# Patient Record
Sex: MALE | Race: WHITE | NOT HISPANIC OR LATINO | Employment: FULL TIME | URBAN - METROPOLITAN AREA
[De-identification: names, ages, dates, MRNs, and addresses within clinical notes are randomized per-mention and may not be internally consistent; named-entity substitution may affect disease eponyms.]

---

## 2020-06-08 ENCOUNTER — HOSPITAL ENCOUNTER (OUTPATIENT)
Dept: LAB | Facility: HOSPITAL | Age: 60
Discharge: HOME | End: 2020-06-08
Attending: INTERNAL MEDICINE

## 2020-06-08 LAB
ALBUMIN SERPL-MCNC: 4.4 G/DL (ref 3.5–5)
ALBUMIN/GLOB SERPL: 1.3 {RATIO} (ref 1.4–2.6)
ALP SERPL-CCNC: 86 U/L (ref 56–119)
ALT SERPL-CCNC: 30 U/L (ref 10–40)
ANION GAP SERPL CALC-SCNC: 16 MMOL/L (ref 8–19)
APPEARANCE UR: CLEAR
AST SERPL-CCNC: 20 U/L (ref 15–50)
BASOPHILS # BLD AUTO: 0.12 10*3/UL (ref 0–0.2)
BASOPHILS NFR BLD AUTO: 1.1 % (ref 0–3)
BILIRUB SERPL-MCNC: 0.26 MG/DL (ref 0.2–1.3)
BILIRUB UR QL: NEGATIVE
BUN SERPL-MCNC: 15 MG/DL (ref 5–25)
BUN/CREAT SERPL: 14 {RATIO} (ref 6–20)
CALCIUM SERPL-MCNC: 9.8 MG/DL (ref 8.7–10.4)
CHLORIDE SERPL-SCNC: 103 MMOL/L (ref 99–111)
CHOLEST SERPL-MCNC: 240 MG/DL (ref 107–200)
CHOLEST/HDLC SERPL: 7.7 {RATIO} (ref 3–6)
COLOR UR: YELLOW
CONV ABS IMM GRAN: 0.02 10*3/UL (ref 0–0.2)
CONV BACTERIA: NEGATIVE
CONV CO2: 23 MMOL/L (ref 22–32)
CONV COLLECTION SOURCE (UA): ABNORMAL
CONV IMMATURE GRAN: 0.2 % (ref 0–1.8)
CONV TOTAL PROTEIN: 7.7 G/DL (ref 6.3–8.2)
CONV UROBILINOGEN IN URINE BY AUTOMATED TEST STRIP: 1 {EHRLICHU}/DL (ref 0.1–1)
CREAT UR-MCNC: 1.11 MG/DL (ref 0.7–1.2)
DEPRECATED RDW RBC AUTO: 46 FL (ref 35.1–43.9)
EOSINOPHIL # BLD AUTO: 0.45 10*3/UL (ref 0–0.7)
EOSINOPHIL # BLD AUTO: 4 % (ref 0–7)
ERYTHROCYTE [DISTWIDTH] IN BLOOD BY AUTOMATED COUNT: 13.5 % (ref 11.6–14.4)
EST. AVERAGE GLUCOSE BLD GHB EST-MCNC: 123 MG/DL
GFR SERPLBLD BASED ON 1.73 SQ M-ARVRAT: >60 ML/MIN/{1.73_M2}
GLOBULIN UR ELPH-MCNC: 3.3 G/DL (ref 2–3.5)
GLUCOSE SERPL-MCNC: 81 MG/DL (ref 70–99)
GLUCOSE UR QL: NEGATIVE MG/DL
HBA1C MFR BLD: 5.9 % (ref 3.5–5.7)
HCT VFR BLD AUTO: 51.9 % (ref 42–52)
HDLC SERPL-MCNC: 31 MG/DL (ref 40–60)
HGB BLD-MCNC: 17.5 G/DL (ref 14–18)
HGB UR QL STRIP: NEGATIVE
KETONES UR QL STRIP: NEGATIVE MG/DL
LDLC SERPL CALC-MCNC: 153 MG/DL (ref 70–100)
LEUKOCYTE ESTERASE UR QL STRIP: ABNORMAL
LYMPHOCYTES # BLD AUTO: 3.79 10*3/UL (ref 1–5)
LYMPHOCYTES NFR BLD AUTO: 33.6 % (ref 20–45)
MAGNESIUM SERPL-MCNC: 2.17 MG/DL (ref 1.6–2.3)
MCH RBC QN AUTO: 30.8 PG (ref 27–31)
MCHC RBC AUTO-ENTMCNC: 33.7 G/DL (ref 33–37)
MCV RBC AUTO: 91.4 FL (ref 80–96)
MONOCYTES # BLD AUTO: 0.85 10*3/UL (ref 0.2–1.2)
MONOCYTES NFR BLD AUTO: 7.5 % (ref 3–10)
NEUTROPHILS # BLD AUTO: 6.06 10*3/UL (ref 2–8)
NEUTROPHILS NFR BLD AUTO: 53.6 % (ref 30–85)
NITRITE UR QL STRIP: NEGATIVE
NRBC CBCN: 0 % (ref 0–0.7)
OSMOLALITY SERPL CALC.SUM OF ELEC: 286 MOSM/KG (ref 273–304)
PH UR STRIP.AUTO: 5.5 [PH] (ref 5–8)
PLATELET # BLD AUTO: 331 10*3/UL (ref 130–400)
PMV BLD AUTO: 10.6 FL (ref 9.4–12.4)
POTASSIUM SERPL-SCNC: 4 MMOL/L (ref 3.5–5.3)
PROT UR QL: NEGATIVE MG/DL
RBC # BLD AUTO: 5.68 10*6/UL (ref 4.7–6.1)
RBC #/AREA URNS HPF: ABNORMAL /[HPF]
SODIUM SERPL-SCNC: 138 MMOL/L (ref 135–147)
SP GR UR: 1.02 (ref 1–1.03)
T4 FREE SERPL-MCNC: 1.2 NG/DL (ref 0.9–1.8)
TRIGL SERPL-MCNC: 423 MG/DL (ref 40–150)
TSH SERPL-ACNC: 1.03 M[IU]/L (ref 0.27–4.2)
WBC # BLD AUTO: 11.29 10*3/UL (ref 4.8–10.8)
WBC #/AREA URNS HPF: ABNORMAL /[HPF]

## 2020-08-10 ENCOUNTER — HOSPITAL ENCOUNTER (OUTPATIENT)
Dept: LAB | Facility: HOSPITAL | Age: 60
Discharge: HOME | End: 2020-08-10
Attending: INTERNAL MEDICINE

## 2020-08-10 ENCOUNTER — HOSPITAL ENCOUNTER (OUTPATIENT)
Dept: CT IMAGING | Facility: HOSPITAL | Age: 60
Discharge: HOME | End: 2020-08-10
Attending: INTERNAL MEDICINE

## 2020-08-10 LAB
ANION GAP SERPL CALC-SCNC: 22 MMOL/L (ref 8–19)
BUN SERPL-MCNC: 15 MG/DL (ref 5–25)
BUN/CREAT SERPL: 13 {RATIO} (ref 6–20)
CALCIUM SERPL-MCNC: 10 MG/DL (ref 8.7–10.4)
CHLORIDE SERPL-SCNC: 103 MMOL/L (ref 99–111)
CONV CO2: 20 MMOL/L (ref 22–32)
CREAT UR-MCNC: 1.19 MG/DL (ref 0.7–1.2)
GFR SERPLBLD BASED ON 1.73 SQ M-ARVRAT: >60 ML/MIN/{1.73_M2}
GLUCOSE SERPL-MCNC: 125 MG/DL (ref 70–99)
OSMOLALITY SERPL CALC.SUM OF ELEC: 294 MOSM/KG (ref 273–304)
POTASSIUM SERPL-SCNC: 4.4 MMOL/L (ref 3.5–5.3)
SODIUM SERPL-SCNC: 141 MMOL/L (ref 135–147)

## 2020-08-25 ENCOUNTER — OFFICE VISIT CONVERTED (OUTPATIENT)
Dept: OTHER | Facility: HOSPITAL | Age: 60
End: 2020-08-25
Attending: INTERNAL MEDICINE

## 2020-08-31 ENCOUNTER — HOSPITAL ENCOUNTER (OUTPATIENT)
Dept: PET IMAGING | Facility: HOSPITAL | Age: 60
Discharge: HOME | End: 2020-08-31
Attending: INTERNAL MEDICINE

## 2020-09-01 ENCOUNTER — OFFICE VISIT CONVERTED (OUTPATIENT)
Dept: PULMONOLOGY | Facility: CLINIC | Age: 60
End: 2020-09-01
Attending: INTERNAL MEDICINE

## 2021-03-03 ENCOUNTER — HOSPITAL ENCOUNTER (OUTPATIENT)
Dept: URGENT CARE | Facility: CLINIC | Age: 61
Discharge: HOME | End: 2021-03-03
Attending: EMERGENCY MEDICINE

## 2021-03-04 LAB — SARS-COV-2 RNA SPEC QL NAA+PROBE: NOT DETECTED

## 2021-03-05 LAB — BACTERIA SPEC AEROBE CULT: NORMAL

## 2021-03-29 ENCOUNTER — HOSPITAL ENCOUNTER (OUTPATIENT)
Dept: LAB | Facility: HOSPITAL | Age: 61
Discharge: HOME | End: 2021-03-29
Attending: INTERNAL MEDICINE

## 2021-03-29 LAB
ALBUMIN SERPL-MCNC: 3.9 G/DL (ref 3.5–5)
ALBUMIN/GLOB SERPL: 1.4 {RATIO} (ref 1.4–2.6)
ALP SERPL-CCNC: 74 U/L (ref 56–119)
ALT SERPL-CCNC: 20 U/L (ref 10–40)
ANION GAP SERPL CALC-SCNC: 20 MMOL/L (ref 8–19)
AST SERPL-CCNC: 20 U/L (ref 15–50)
BASOPHILS # BLD AUTO: 0.11 10*3/UL (ref 0–0.2)
BASOPHILS NFR BLD AUTO: 1.1 % (ref 0–3)
BILIRUB SERPL-MCNC: 0.25 MG/DL (ref 0.2–1.3)
BUN SERPL-MCNC: 13 MG/DL (ref 5–25)
BUN/CREAT SERPL: 12 {RATIO} (ref 6–20)
CALCIUM SERPL-MCNC: 9.4 MG/DL (ref 8.7–10.4)
CHLORIDE SERPL-SCNC: 103 MMOL/L (ref 99–111)
CHOLEST SERPL-MCNC: 233 MG/DL (ref 107–200)
CHOLEST/HDLC SERPL: 6 {RATIO} (ref 3–6)
CONV ABS IMM GRAN: 0.02 10*3/UL (ref 0–0.2)
CONV CO2: 22 MMOL/L (ref 22–32)
CONV IMMATURE GRAN: 0.2 % (ref 0–1.8)
CONV TOTAL PROTEIN: 6.7 G/DL (ref 6.3–8.2)
CREAT UR-MCNC: 1.12 MG/DL (ref 0.7–1.2)
DEPRECATED RDW RBC AUTO: 46.3 FL (ref 35.1–43.9)
EOSINOPHIL # BLD AUTO: 0.36 10*3/UL (ref 0–0.7)
EOSINOPHIL # BLD AUTO: 3.5 % (ref 0–7)
ERYTHROCYTE [DISTWIDTH] IN BLOOD BY AUTOMATED COUNT: 13.7 % (ref 11.6–14.4)
EST. AVERAGE GLUCOSE BLD GHB EST-MCNC: 111 MG/DL
GFR SERPLBLD BASED ON 1.73 SQ M-ARVRAT: >60 ML/MIN/{1.73_M2}
GLOBULIN UR ELPH-MCNC: 2.8 G/DL (ref 2–3.5)
GLUCOSE SERPL-MCNC: 126 MG/DL (ref 70–99)
HBA1C MFR BLD: 5.5 % (ref 3.5–5.7)
HCT VFR BLD AUTO: 46.4 % (ref 42–52)
HDLC SERPL-MCNC: 39 MG/DL (ref 40–60)
HGB BLD-MCNC: 15.4 G/DL (ref 14–18)
LDLC SERPL CALC-MCNC: 141 MG/DL (ref 70–100)
LYMPHOCYTES # BLD AUTO: 3.08 10*3/UL (ref 1–5)
LYMPHOCYTES NFR BLD AUTO: 30.3 % (ref 20–45)
MCH RBC QN AUTO: 30.4 PG (ref 27–31)
MCHC RBC AUTO-ENTMCNC: 33.2 G/DL (ref 33–37)
MCV RBC AUTO: 91.5 FL (ref 80–96)
MONOCYTES # BLD AUTO: 0.49 10*3/UL (ref 0.2–1.2)
MONOCYTES NFR BLD AUTO: 4.8 % (ref 3–10)
NEUTROPHILS # BLD AUTO: 6.09 10*3/UL (ref 2–8)
NEUTROPHILS NFR BLD AUTO: 60.1 % (ref 30–85)
NRBC CBCN: 0 % (ref 0–0.7)
OSMOLALITY SERPL CALC.SUM OF ELEC: 294 MOSM/KG (ref 273–304)
PLATELET # BLD AUTO: 291 10*3/UL (ref 130–400)
PMV BLD AUTO: 10 FL (ref 9.4–12.4)
POTASSIUM SERPL-SCNC: 4.2 MMOL/L (ref 3.5–5.3)
PSA SERPL-MCNC: 3.05 NG/ML (ref 0–4)
RBC # BLD AUTO: 5.07 10*6/UL (ref 4.7–6.1)
SODIUM SERPL-SCNC: 141 MMOL/L (ref 135–147)
TRIGL SERPL-MCNC: 263 MG/DL (ref 40–150)
VLDLC SERPL-MCNC: 53 MG/DL (ref 5–37)
WBC # BLD AUTO: 10.15 10*3/UL (ref 4.8–10.8)

## 2021-04-05 ENCOUNTER — HOSPITAL ENCOUNTER (OUTPATIENT)
Dept: CT IMAGING | Facility: HOSPITAL | Age: 61
Discharge: HOME | End: 2021-04-05
Attending: INTERNAL MEDICINE

## 2021-05-03 ENCOUNTER — OFFICE VISIT CONVERTED (OUTPATIENT)
Dept: PULMONOLOGY | Facility: CLINIC | Age: 61
End: 2021-05-03
Attending: INTERNAL MEDICINE

## 2021-05-22 ENCOUNTER — TRANSCRIBE ORDERS (OUTPATIENT)
Dept: PULMONOLOGY | Facility: CLINIC | Age: 61
End: 2021-05-22

## 2021-05-22 DIAGNOSIS — R91.1 PULMONARY NODULE: Primary | ICD-10-CM

## 2021-05-28 VITALS
SYSTOLIC BLOOD PRESSURE: 139 MMHG | HEIGHT: 72 IN | OXYGEN SATURATION: 96 % | HEART RATE: 112 BPM | WEIGHT: 305 LBS | BODY MASS INDEX: 41.31 KG/M2 | HEART RATE: 96 BPM | TEMPERATURE: 98.2 F | BODY MASS INDEX: 39.28 KG/M2 | TEMPERATURE: 96.9 F | HEIGHT: 72 IN | SYSTOLIC BLOOD PRESSURE: 95 MMHG | OXYGEN SATURATION: 98 % | RESPIRATION RATE: 20 BRPM | DIASTOLIC BLOOD PRESSURE: 63 MMHG | WEIGHT: 290 LBS | DIASTOLIC BLOOD PRESSURE: 90 MMHG | RESPIRATION RATE: 16 BRPM

## 2021-05-28 VITALS
HEART RATE: 103 BPM | SYSTOLIC BLOOD PRESSURE: 136 MMHG | RESPIRATION RATE: 18 BRPM | HEIGHT: 72 IN | BODY MASS INDEX: 40.14 KG/M2 | WEIGHT: 296.37 LBS | DIASTOLIC BLOOD PRESSURE: 79 MMHG | OXYGEN SATURATION: 96 % | TEMPERATURE: 97.4 F

## 2021-05-28 NOTE — PROGRESS NOTES
Patient: CAITLIN MCKENNA JR     Acct: [id number]     Report: #[id number]  UNIT #: [id number]     : 1960    Encounter Date:2020  PRIMARY CARE: CIERRA ZHOU  ***Signed***  --------------------------------------------------------------------------------------------------------------------  Chief Complaint      Encounter Date      Sep 1, 2020            Primary Care Provider      CIERRA ZHOU            Referring Provider      CIERRA ZHOU            Patient Complaint      Patient is complaining of      Pet results            VITALS      Height 6 ft  / 182.88 cm      Weight 290 lbs  / 131.819432 kg      BSA 2.49 m2      BMI 39.3 kg/m2      Temperature 98.2 F / 36.78 C - Tympanic      Pulse 96      Respirations 16      Blood Pressure 139/90 Sitting, Right Arm      Pulse Oximetry 96%, room air            HPI      The patient is a very pleasant 60 year old morbidly obese  male     cigarettes smoker with solitary lung nodules here for follow up today.             Since his last office visit PET CT scan was done showing no uptake in 9 mm     pulmonary nodule. He is still smoking about 4-5 cigarettes a day. We will do     serial follow up for this patient. He denies any dyspnea, cough, wheezing,     headaches and hemoptysis or chest pain. He feels much better since starting     trelegy ellipta inhaler last week. He denies any wheezing, weight loss, nausea     or vomiting, fever or chills. He is able to perform his ADL's without difficulty    and denies any swollen glands in his lymph nodes, head or neck.            I personally reviewed Review of Systems, family, social, surgical and medical     history and agree with their findings.            ROS      Constitutional:  Denies: Fatigue, Fever, Weight gain, Weight loss, Chills,     Insomnia, Other      Respiratory/Breathing:  Denies: Shortness of air, Wheezing, Cough, Hemoptysis,     Pleuritic pain, Other      Endocrine:  Denies: Polydipsia,  Polyuria, Heat/cold intolerance, Diabetes, Other      Eyes:  Denies: Blurred vision, Vision Changes, Other      Ears, nose, mouth, throat:  Denies: Mouth lesions, Thrush, Throat pain, Hoarse    ness, Allergies/Hay Fever, Post Nasal Drip, Headaches, Recent Head Injury, Nose     Bleeding, Neck Stiffness, Thyroid Mass, Hearing Loss, Ear Fullness, Dry Mouth,     Nasal or Sinus Pain, Dry Lips, Nasal discharge, Nasal congestion, Other      Cardiovascular:  Denies: Palpitations, Syncope, Claudication, Chest Pain, Wake     up Gasping for air, Leg Swelling, Irregular Heart Rate, Cyanosis, Dyspnea on     Exertion, Other      Gastrointestinal:  Denies: Nausea, Constipation, Diarrhea, Abdominal pain,     Vomiting, Difficulty Swallowing, Reflux/Heartburn, Dysphagia, Jaundice,     Bloating, Melena, Bloody stools, Other      Genitourinary:  Denies: Urinary frequency, Incontinence, Hematuria, Urgency,     Nocturia, Dysuria, Testicular problems, Other      Musculoskeletal:  Denies: Joint Pain, Joint Stiffness, Joint Swelling, Myalgias,    Other      Hematologic/lymphatic:  DENIES: Lymphadenopathy, Bruising, Bleeding tendencies,     Other      Neurological:  Denies: Headache, Numbness, Weakness, Seizures, Other      Psychiatric:  Denies: Anxiety, Appropriate Effect, Depression, Other      Sleep:  No: Excessive daytime sleep, Morning Headache?, Snoring, Insomnia?, Stop    breathing at sleep?, Other      Integumentary:  Denies: Rash, Dry skin, Skin Warm to Touch, Other      Immunologic/Allergic:  Denies: Latex allergy, Seasonal allergies, Asthma,     Urticaria, Eczema, Other      Immunization status:  No: Up to date            FAMILY/SOCIAL/MEDICAL HX      Surgical History:  Yes: Nose Surgery      Cancer/Type - Family Hx:  Mother (breast cancer)      Other Family Medical History:  Father      Is Father Still Living?:  Yes      Is Mother Still Living?:  Yes       Family History:  Yes      Social History:  Tobacco Use; No Alcohol Use, No  Recreational Drug use      Smoking status:  Current every day smoker (40 years x 2 ppd, currently smoking 1    ppd)      Anticoagulation Therapy:  No      Antibiotic Prophylaxis:  No      Medical History:  Yes: High Blood Pressure; No: Sinus Trouble      Psychiatric History      none            PREVENTION      Hx Influenza Vaccination:  No      Influenza Vaccine Declined:  Yes      2 or More Falls in Past Year?:  No      Fall Past Year with Injury?:  No      Hx Pneumococcal Vaccination:  No      Encouraged to follow-up with:  PCP regarding preventative exams.      Chart initiated by      Candi Olea CMA            ALLERGIES/MEDICATIONS      Allergies:        Coded Allergies:             NO KNOWN DRUG ALLERGIES (Verified  Allergy, Unknown, 9/1/20)      Medications    Last Reconciled on 9/1/20 15:35 by CLEMENCIA HENNING MD      Fluticasone/Umeclidin/Vilanter (Trelegy Ellipta 100-62.5-25) 1 Each Blst.w.dev      1 PUFF INH RTQDAY for 30 Days, #1 INH 4 Refills         Prov: Guanakito Leonardo         8/25/20       MDI-Albuterol (Proair HFA) 8.5 Gm Hfa.aer.ad      2 PUFFS INH Q6H PRN for SHORTNESS OF BREATH, #1 MDI 0 Refills         Reported         8/24/20       Irbesartan (Irbesartan) 75 Mg Tablet      75 MG PO QDAY for 30 Days, #30 TAB         Reported         8/24/20       amLODIPine (amLODIPine) 5 Mg Tablet      5 MG PO HS, #30 TAB         Reported         8/24/20      Current Medications      Current Medications Reviewed 9/1/20            EXAM      Vital Signs Reviewed      Gen: WDWN, Alert, NAD.        HEENT:  PERRL, EOMI.  OP, nares clear, no sinus tenderness.      Neck:  Supple, no JVD, no thyromegaly.      Lymph: No axillary, cervical, supraclavicular lymphadenopathy noted bilaterally.      Chest:  Good aeration, clear to auscultation bilaterally, tympanic to percussion    bilaterally, no work of breathing noted.      CV:  RRR, no MGR, pulses 2+, equal.      Abd:  Soft, NT, ND, + BS, no HSM. Obese.        EXT:  No  clubbing, no cyanosis, no edema, no joint tenderness.       Neuro:  A  Skin: No rashes or lesions.      Vtials      Vitals:             Height 6 ft  / 182.88 cm           Weight 290 lbs  / 131.575452 kg           BSA 2.49 m2           BMI 39.3 kg/m2           Temperature 98.2 F / 36.78 C - Tympanic           Pulse 96           Respirations 16           Blood Pressure 139/90 Sitting, Right Arm           Pulse Oximetry 96%, room air            REVIEW      Results Reviewed      PCCS Results Reviewed?:  Yes Prev Radiology Results, Yes Previous Mecial Records      Lab Results      I personally reviewed Dr. Leonardo's last office note.      Radiographic Results               [facility]                PACS RADIOLOGY REPORT            Patient: CAITLIN MCKENNA JR   Acct: #[id number]   Report: #[id number]            UNIT #: [id number]    DOS: 20 1309      INSURANCE:[employer] PLANS   ORDER #:[id number]      LOCATION:PET     : 1960            PROVIDERS      ADMITTING:     ATTENDING: Guanakito Leonardo      FAMILY:  CIERRA ZHOU   ORDERING:  Guanakito Leonardo         OTHER:    DICTATING:  Tomás Chapman MD            REQ #:[id number]   EXAM:[id number] - SKULL BASE-MIDTHIGH INIT fdg      REASON FOR EXAM:  R91.1      REASON FOR VISIT:  R91.1            *******Signed******         PROCEDURE:   PET CT SKULL BASE TO MID THIGH INITIAL             COMPARISON:   [facility], CT, CT LOW DOSE CHEST SCREENING,     8/10/2020, 8:32.             INDICATIONS:   SPN             TECHNIQUE:   After obtaining the patient's consent, F-18 FDG was administered     intravenously.  A PET       scan with concurrent CT imaging was performed from the skull to the thigh with     multiplanar imaging.             RADIONUCLIDE:     12.1 MCI   F18 FDG- I.V.      LABS:                          Blood Glucose 96 mg/dl      UPTAKE TIME:        60 mins      BACKGROUND UPTAKE:   Mediastinal background 2.80 SUV.  Liver background 4.1 SUV.             FINDINGS:         Physiologic uptake is seen in the head and neck.             No abnormal thoracic uptake is evident.  Specifically, no abnormal uptake is     seen in the 9 mm       nodule seen in the right upper lobe.  Follow-up CT scan of the chest is     recommended in 6 months.             No abnormal uptake is seen in the abdomen and pelvis.             2.5 cm umbilical hernia defect is evident.  Fat within the hernia measures 4 cm.                 No abnormal skeletal uptake is evident.             CONCLUSION:         PET-CT demonstrating no abnormal uptake in the 9 mm right upper lobe nodule.             Follow-up CT scan of the chest is recommended in 6 months.                HUGH HERNANDEZ MD             Electronically Signed and Approved By: HUGH HERNANDEZ MD on 2020 at 15:56                                  Until signed, this is an unconfirmed preliminary report that may contain      errors and is subject to change.                                              COUST:      D:20 1556                     [facility]                PACS RADIOLOGY REPORT            Patient: CAITLIN MCKENNA JR   Acct: #[id number]   Report: #[id number]            UNIT #: [id number]    DOS: 08/10/20 0823      INSURANCE:[employer] PL[employer]   ORDER #:[id number]      LOCATION:CT     : 1960            PROVIDERS      ADMITTING:     ATTENDING: CIERRA ZHOU      FAMILY:  CIERRA ZHOU   ORDERING:  CIERRA ZHOU         OTHER:    DICTATING:  SARAH CHURCH MD            REQ #:[id number]   EXAM:[facility] - LOW DOSE CHEST CT SCREENING      REASON FOR EXAM:  CURRENT SMOKER      REASON FOR VISIT:  CURRENT SMOKER            *******Signed******         PROCEDURE:   CT LOW DOSE CHEST SCREENING             COMPARISON:   None.             REASON FOR SCREENING:   Patient is 59 years of age,  asymptomatic, and has a     smoking history of more       than 30 pack years.      SMOKING STATUS:   Current smoker.                  SCREENING VISIT:   Baseline.                   TECHNIQUE:   Axial unenhanced LDCT images from the apices through mid-kidney     were obtained.       Evaluation of solid organs and vascular structures is suboptimal due to the lack    of IV contrast.       Imaging was performed on a Siemens Sensation 64 CT scanner.             RADIATION:   CT Dose Index Vol (CTDIvol):    4.55  mGy         Dose Length Product (DLP):    194  mGy-cm             DIAGNOSTIC QUALITY:   Satisfactory             FINDINGS:         Lobulated 9 mm nodule right upper lobe (image 59).  Mild paraseptal emphysema.      No adenopathy in       the chest.  Coronary artery calcification.  No acute findings in the included     upper abdomen.  No       aggressive appearing bone change.             CONCLUSION:   9 mm nodule right upper lobe.             Lung rads category 4A suspicious.  Per the ACR lung rads recommendations,     suggest either a 3 month       follow-up low-dose chest CT or further characterization with PET-CT.              SARAH CHURCH MD             Electronically Signed and Approved By: SARAH CHURCH MD on 8/10/2020 at 8:50                               Until signed, this is an unconfirmed preliminary report that may contain      errors and is subject to change.                                              DAVID:      D:08/10/20 0850            Assessment      Solitary lung nodule - R91.1            Notes      Discontinued Medications      * Varenicline (Chantix Dose Mt) 1 EACH TAB.DS.PK: 1 TAB PO ASDIR #1         Instructions: 0.5mg qday x 3 day, 0.5mg BID x 4 day, 1mg BID until gone.      * VARENICLINE TARTRATE (Chantix) 1 MG TABLET: 1 MG PO BID 30 Days #60         Instructions: FOR SMOKING CESSATION      New Diagnostics      * Chest W/O Cont CT, 4 Months         Dx: Solitary lung nodule - R91.1       IMPRESSION:      1. Solitary lung nodule PET negative 9 mm in this smoking patient.       2. Emphysema without exacerbation, doing better with trelegy ellipta inhaler.     Patient has pulmonary function test pending.       3. Tobacco abuse of cigarettes.      4. Obesity with BMI 39.3.      5. Chronic dyspnea improved.       6. Wheezing resolved.            PLAN:      1. I discussed the case in multidisciplinary fashion with radiology, hematology,    oncology, thoracic surgery,  radiation oncology and myself.      2. There is a 9 mm PET negative nodule in this high risk patient so we will need    to follow for malignancy but could easily be a granuloma. We will do a short     interval follow up CT scan in 4 months and follow this patient per Fleischner     criteria as a high risk patient. If no change in size we will follow nodules for    24 months of stability.       3. Continue trelegy ellipta inhaler.       4. He has pulmonary function test ordered.       5. Smoking cessation counseling provided.  I spent 3 minutes today counseling     the patient on risks of smoking, including throat cancer, lung cancer, COPD,     heart disease and death. I also discussed the benefits of quitting.        6.  I spent 4 minutes discussing diet and exercise counseling. I recommend 30     minutes of daily exercise as well as 1800 calorie low fat diet.      7. He is up to date with flu vaccine, we will assess Prevnar and Pneumovax at     next office visit.       8. Follow up with Dr. Leonardo in 4 months with CT scan results.            Patient Education      ACO BMI High above 25:  Counseling Given, Encouraged weight loss, Encourage     dietary changes      Tobacco Cessation Counseling:  for 3 - 10 minutes      Patient Education Provided:  Smoking Cessation            Electronically signed by CLEMENCIA HENNING  09/02/2020 15:57       Disclaimer: Converted document may not contain table formatting or lab diagrams. Please see [facility] for the authenticated document.

## 2021-05-28 NOTE — PROGRESS NOTES
Patient: CAITLIN MCKENNA JR     Acct: [id number]     Report: #[id number]  UNIT #: [id number]     : 1960    Encounter Date:2020  PRIMARY CARE: CIERRA ZHOU  ***Signed***  --------------------------------------------------------------------------------------------------------------------  Chief Complaint      Encounter Date      Aug 25, 2020            Primary Care Provider      CIERRA ZHOU            Referring Provider      CIERRA ZHOU            Patient Complaint      Patient is complaining of      New PT here today for Lung Nodule            VITALS      Height 6 ft 0 in / 182.88 cm      Weight 296 lbs 6 oz / 134.495795 kg      BSA 2.52 m2      BMI 40.2 kg/m2      Temperature 97.4 F / 36.33 C - Temporal      Pulse 103      Respirations 18      Blood Pressure 136/79 Sitting, Right Arm      Pulse Oximetry 96%, Room air            HPI      The patient is a very pleasant 60 year old white male here today to be evaluated    for abnormal CT scan of the chest.             The patient underwent a low dose lung cancer screening CT scan of the chest by     his primary care provider on 08/10/2020 that showed a 9 mm right upper lobe     pulmonary lesion suspicious for an early lung malignancy. The patient is a heavy    smoker, he works as a . He says he has quit smoking in the past but     it was very short lived due to his job. The patient has some daily cough but no     worsening over his baseline. He has wheezing and some dyspnea that is worse with    exertion, improved with rest. He does not have any overt chest pain at this     time.  He has no fever or chills, no change in character of his cough and no     hemoptysis. He has no swollen or enlarged lymph nodes and no unintentional     weight loss. His shortness of breath is moderate in severity, it does not impact    his day to day activities as he is not very active being a . He has     no swollen or enlarged lymph nodes that  he is aware of.            ROS      Constitutional:  Denies: Fatigue, Fever, Weight gain, Weight loss, Chills, Inso    mnia, Other      Respiratory/Breathing:  Complains of: Shortness of air, Cough; Denies: Wheezing,    Hemoptysis, Pleuritic pain, Other      Endocrine:  Denies: Polydipsia, Polyuria, Heat/cold intolerance, Diabetes, Other      Eyes:  Denies: Blurred vision, Vision Changes, Other      Ears, nose, mouth, throat:  Denies: Mouth lesions, Thrush, Throat pain,     Hoarseness, Allergies/Hay Fever, Post Nasal Drip, Headaches, Recent Head Injury,    Nose Bleeding, Neck Stiffness, Thyroid Mass, Hearing Loss, Ear Fullness, Dry     Mouth, Nasal or Sinus Pain, Dry Lips, Nasal discharge, Nasal congestion, Other      Cardiovascular:  Denies: Palpitations, Syncope, Claudication, Chest Pain, Wake     up Gasping for air, Leg Swelling, Irregular Heart Rate, Cyanosis, Dyspnea on     Exertion, Other      Gastrointestinal:  Denies: Nausea, Constipation, Diarrhea, Abdominal pain,     Vomiting, Difficulty Swallowing, Reflux/Heartburn, Dysphagia, Jaundice,     Bloating, Melena, Bloody stools, Other      Genitourinary:  Denies: Urinary frequency, Incontinence, Hematuria, Urgency,     Nocturia, Dysuria, Testicular problems, Other      Musculoskeletal:  Denies: Joint Pain, Joint Stiffness, Joint Swelling, Myalgias,    Other      Hematologic/lymphatic:  DENIES: Lymphadenopathy, Bruising, Bleeding tendencies,     Other      Neurological:  Denies: Headache, Numbness, Weakness, Seizures, Other      Psychiatric:  Denies: Anxiety, Appropriate Effect, Depression, Other      Sleep:  No: Excessive daytime sleep, Morning Headache?, Snoring, Insomnia?, Stop    breathing at sleep?, Other      Integumentary:  Denies: Rash, Dry skin, Skin Warm to Touch, Other      Immunologic/Allergic:  Denies: Latex allergy, Seasonal allergies, Asthma,     Urticaria, Eczema, Other      Immunization status:  No: Up to date            FAMILY/SOCIAL/MEDICAL HX       Other Family Medical History:  Father (COPD )      Is Father Still Living?:  Yes      Is Mother Still Living?:  Yes      Smoking status:  Current every day smoker (smoker X40 yrs, started at 19, 2 PPD     )      Anticoagulation Therapy:  No      Antibiotic Prophylaxis:  No      Medical History:  Yes: High Blood Pressure, High Cholesterol      Psychiatric History      none            PREVENTION      Hx Influenza Vaccination:  Yes      Influenza Vaccine Declined:  Yes      2 or More Falls in Past Year?:  No      Fall Past Year with Injury?:  No      Hx Pneumococcal Vaccination:  No      Encouraged to follow-up with:  PCP regarding preventative exams.      Chart initiated by      Florence Jane MA            ALLERGIES/MEDICATIONS      Allergies:        Coded Allergies:             No Known Drug Allergies (Verified  Allergy, 8/24/20)      Medications    Last Reconciled on 8/25/20 14:52 by SANTI TENA MD      MDI-Albuterol (Proair HFA) 8.5 Gm Hfa.aer.ad      2 PUFFS INH Q6H PRN for SHORTNESS OF BREATH, #1 MDI 0 Refills         Reported         8/24/20       Irbesartan (Irbesartan) 75 Mg Tablet      75 MG PO QDAY for 30 Days, #30 TAB         Reported         8/24/20       amLODIPine (amLODIPine) 5 Mg Tablet      5 MG PO HS, #30 TAB         Reported         8/24/20      Current Medications      Current Medications Reviewed 8/24/20            EXAM      Vital Signs Reviewed      Gen: WDWN, Alert, NAD.        HEENT:  PERRL, EOMI.  OP, nares clear, no sinus tenderness.      Neck:  Supple, no JVD, no thyromegaly.      Lymph: No axillary, cervical, supraclavicular lymphadenopathy noted bilaterally.      Chest: Scattered expiratory wheezing heard throughout all lung fields with     occasional rhonchi, tympanic to percussion bilaterally, no work of breathing     noted.      CV:  RRR, no MGR, pulses 2+, equal.      Abd:  Soft, NT, ND, + BS, no HSM.      EXT:  No clubbing, no cyanosis, no edema, no joint tenderness.        Neuro:  A  Skin: No rashes or lesions.      Vtials      Vitals:             Height 6 ft 0 in / 182.88 cm           Weight 296 lbs 6 oz / 134.564172 kg           BSA 2.52 m2           BMI 40.2 kg/m2           Temperature 97.4 F / 36.33 C - Temporal           Pulse 103           Respirations 18           Blood Pressure 136/79 Sitting, Right Arm           Pulse Oximetry 96%, Room air            REVIEW      Results Reviewed      PCCS Results Reviewed?:  Yes Prev Lab Results, Yes Prev Radiology Results, Yes     Previous Mecial Records            Assessment      Solitary pulmonary nodule - R91.1            Encounter for screening for other viral diseases - Z11.59            Notes      New Medications      * amLODIPine 5 MG TABLET: 5 MG PO HS #30      * Irbesartan 75 MG TABLET: 75 MG PO QDAY 30 Days #30      * MDI-Albuterol (Proair HFA) 8.5 GM HFA.AER.AD: 2 PUFFS INH Q6H PRN SHORTNESS OF      BREATH #1      * Varenicline (Chantix Dose Mt) 1 EACH TAB.DS.PK: 1 TAB PO ASDIR #1         Instructions: 0.5mg qday x 3 day, 0.5mg BID x 4 day, 1mg BID until gone.      * VARENICLINE TARTRATE (Chantix) 1 MG TABLET: 1 MG PO BID 30 Days #60         Instructions: FOR SMOKING CESSATION      * Fluticasone/Umeclidin/Vilanter (Trelegy Ellipta 100-62.5-25) 1 EACH       BLST.W.DEV: 1 PUFF INH RTQDAY 30 Days #1      New Diagnostics      * PFT Comp PrePost DLCO BodBx sx, Week         Dx: Solitary pulmonary nodule - R91.1      * PET [facility], SCHEDULED PROCEDURE         Dx: Solitary pulmonary nodule - R91.1      * [facility] Pre-Op Covid Screening, Routine         Dx: Solitary pulmonary nodule - R91.1      ASSESSMENT:      1. Right upper lobe pulmonary nodule.       2. Tobacco abuse of cigarettes with ongoing cigarettes smoking.       3. Dyspnea.       4. Chronic cough.             PLAN:      1. Obtain PET scan.       2. Obtain pulmonary function test.       3. I discussed with the patient the importance of smoking cessation. I spent      approximately 4 minutes discussing smoking cessation and we have decided to do     chantix. The patient will be given a chantix starter pack and then given a     prescription for chantix 1 mg twice daily.       4. We will give the patient a prescription for trelegy ellipta inhaler. He was     given a prescription card for a free 30 day trial to see if he likes this     medication.       5. We will see the patient back in 1-2 weeks to go over the result of the PET     scan. Based upon the results of the PET scan and pulmonary function test, the     patient will need either serial CT scans or a biopsy.       6. I have personally reviewed laboratory data, imaging and previous medical     records.            Patient Education      Tobacco Cessation Counseling:  for 3 - 10 minutes      Education resources provided:  Yes      Patient Education Provided:  Smoking Cessation            Electronically signed by Guanakito Leonardo  08/27/2020 08:22       Disclaimer: Converted document may not contain table formatting or lab diagrams. Please see [employer] [facility] for the authenticated document.

## 2021-05-28 NOTE — PROGRESS NOTES
Patient: CAITLIN MCKENNA JR     Acct: [id number]     Report: #[id number]  UNIT #: [id number]     : 1960    Encounter Date:2021  PRIMARY CARE: CIERRA ZHOU  ***Signed***  --------------------------------------------------------------------------------------------------------------------  Chief Complaint      Encounter Date      May 3, 2021            Primary Care Provider      CIERRA ZHOU            Referring Provider      CIERRA ZHOU            Patient Complaint      Patient is complaining of      Patient is here for follow up, CT results            VITALS      Height 6 ft  / 182.88 cm      Weight 305 lbs  / 138.546259 kg      BSA 2.55 m2      BMI 41.4 kg/m2      Temperature 96.9 F / 36.06 C - Tympanic      Pulse 112      Respirations 20      Blood Pressure 95/63 Sitting, Right Arm      Pulse Oximetry 98%, room air            HPI      The patient is a 60 year old morbidly obese  male cigarette smoker with    emphysema and a solitary lung nodule here for follow up.  Since last visit, he     did not have his four month follow up chest CT and just had it done recently     which was an 8 month follow up showing no change in size in a 9 mm nodule that     was previously PET negative.  He also had some emphysema.  He did not have PFT     and six minute walk test, understandably so because they cost 500 dollars.  He     is on his Trelegy 100 and doing really well with this. He is asking for refills.    He has not needed any rescue inhaler over the last few months. Weight is about     the same. He gets short of breath walking about 800 feet or up a flight of     steps.  It is moderate in severity, worse with exertion and relieved with rest,     but denies any coughing, wheezing, headaches, chest pain, weight loss or     hemoptysis.  He is able to perform his ADLs without difficulty.  Denies any     swollen glands or lymph nodes of the head and neck.            I have personally reviewed the  review of systems, past family, social, surgical     and medical histories and I agree with the findings.            ROS      Constitutional:  Complains of: Fatigue; Denies: Fever, Weight gain, Weight loss,    Chills, Insomnia, Other      Respiratory/Breathing:  Complains of: Shortness of air, Wheezing, Cough; Denies:    Hemoptysis, Pleuritic pain, Other      Endocrine:  Denies: Polydipsia, Polyuria, Heat/cold intolerance, Diabetes, Other      Eyes:  Denies: Blurred vision, Vision Changes, Other      Ears, nose, mouth, throat:  Denies: Mouth lesions, Thrush, Throat pain,     Hoarseness, Allergies/Hay Fever, Post Nasal Drip, Headaches, Recent Head Injury,    Nose Bleeding, Neck Stiffness, Thyroid Mass, Hearing Loss, Ear Fullness, Dry     Mouth, Nasal or Sinus Pain, Dry Lips, Nasal discharge, Nasal congestion, Other      Cardiovascular:  Denies: Palpitations, Syncope, Claudication, Chest Pain, Wake     up Gasping for air, Leg Swelling, Irregular Heart Rate, Cyanosis, Dyspnea on     Exertion, Other      Gastrointestinal:  Denies: Nausea, Constipation, Diarrhea, Abdominal pain,     Vomiting, Difficulty Swallowing, Reflux/Heartburn, Dysphagia, Jaundice,     Bloating, Melena, Bloody stools, Other      Genitourinary:  Denies: Urinary frequency, Incontinence, Hematuria, Urgency,     Nocturia, Dysuria, Testicular problems, Other      Musculoskeletal:  Denies: Joint Pain, Joint Stiffness, Joint Swelling, Myalgias,    Other      Hematologic/lymphatic:  DENIES: Lymphadenopathy, Bruising, Bleeding tendencies,     Other      Neurological:  Denies: Headache, Numbness, Weakness, Seizures, Other      Psychiatric:  Denies: Anxiety, Appropriate Effect, Depression, Other      Sleep:  No: Excessive daytime sleep, Morning Headache?, Snoring, Insomnia?, Stop    breathing at sleep?, Other      Integumentary:  Denies: Rash, Dry skin, Skin Warm to Touch, Other      Immunologic/Allergic:  Denies: Latex allergy, Seasonal allergies, Asthma,      Urticaria, Eczema, Other      Immunization status:  No: Up to date            FAMILY/SOCIAL/MEDICAL HX      Surgical History:  Yes: Nose Surgery      Cancer/Type - Family Hx:  Mother (breast cancer)      Other Family Medical History:  Father      Is Father Still Living?:  Yes      Is Mother Still Living?:  Yes       Family History:  Yes      Social History:  Tobacco Use; No Alcohol Use, No Recreational Drug use      Smoking status:  Current every day smoker (40 years x 2 ppd, currently smoking 1    ppd)      Anticoagulation Therapy:  No      Antibiotic Prophylaxis:  No      Medical History:  Yes: High Blood Pressure; No: Sinus Trouble      Psychiatric History      none            PREVENTION      Hx Influenza Vaccination:  No      Influenza Vaccine Declined:  Yes      2 or More Falls in Past Year?:  No      Fall Past Year with Injury?:  No      Hx Pneumococcal Vaccination:  No      Encouraged to follow-up with:  PCP regarding preventative exams.      Chart initiated by      Candi Olea CMA            ALLERGIES/MEDICATIONS      Allergies:        Coded Allergies:             NO KNOWN DRUG ALLERGIES (Verified  Allergy, Unknown, 5/3/21)      Medications    Last Reconciled on 5/3/21 08:40 by CLEMENCIA HENNING MD      Fluticasone/Umeclidin/Vilanter (Trelegy Ellipta 100-62.5-25) 1 Each Blst.w.dev      1 PUFF INH RTQDAY, #1 INH 11 Refills         Prov: CLEMENCIA HENNING         5/3/21       Irbesartan (Irbesartan) 75 Mg Tablet      75 MG PO QDAY for 30 Days, #30 TAB         Reported         8/24/20       amLODIPine (amLODIPine) 5 Mg Tablet      5 MG PO HS, #30 TAB         Reported         8/24/20      Current Medications      Current Medications Reviewed 5/3/21            EXAM      Vital Signs Reviewed.      General:  WDWN, Alert, NAD.      HEENT: PERRL, EOMI.  OP, nares clear, no sinus tenderness.      Chest: Good aeration, bibasilar rhonchi, tympanic to percussion bilaterally, no     work of breathing noted.      CV: RRR, no  MGR, pulses 2+, equal.        Abd: Obese, soft, NT, ND, +BS, no HSM.      EXT: No clubbing, no cyanosis, no edema.        Neuro:  A  Skin: No rashes or lesions.      Vtials      Vitals:             Height 6 ft  / 182.88 cm           Weight 305 lbs  / 138.327646 kg           BSA 2.55 m2           BMI 41.4 kg/m2           Temperature 96.9 F / 36.06 C - Tympanic           Pulse 112           Respirations 20           Blood Pressure 95/63 Sitting, Right Arm           Pulse Oximetry 98%, room air            REVIEW      Results Reviewed      PCCS Results Reviewed?:  Yes Prev Lab Results, Yes Prev Radiology Results, Yes     Previous Mecial Records      Lab Results      I personally reviewed my last office visit note.  I personally reviewed a CT     scan of the chest from 2021.  There has been no change in size of 9 mm lung     nodule and there is emphysema.      Radiographic Results               [facility]                PACS RADIOLOGY REPORT            Patient: CAITLIN MCKENNA    Acct: #[id number]   Report: #[id number]            UNIT #: [id number]    DOS: 21 0753      INSURANCE:[employer] - O   ORDER #:[id number]      LOCATION:CT     : 1960            PROVIDERS      ADMITTING:     ATTENDING: CIERRA ZHOU      FAMILY:  CIERRA ZHOU   ORDERING:  CIERRA ZHOU         OTHER:    DICTATING:  JULIANNA BAGLEY MD            REQ #:[id number]   [facility]      REASON FOR EXAM:  PULM NODULE      REASON FOR VISIT:  PULM NODULE            *******Signed******         PROCEDURE:   CT CHEST WITHOUT CONTRAST             COMPARISON:   [facility], CT, CT LOW DOSE CHEST SCREENING,     8/10/2020, 8:32.             INDICATIONS:   PULM NODULE             TECHNIQUE:   CT images were created without the administration of contrast     material.               PROTOCOL:     Standard imaging protocol performed                 RADIATION:     DLP: 639mGy*cm          Automated exposure control was utilized to minimize radiation dose.              FINDINGS:         Soft tissues of the lower neck demonstrate no acute soft tissue abnormality.      Heart size normal.        Coronary calcifications noted.  No pericardial effusion.  Scattered mediastinal     lymph nodes below       size criteria.  There is a circumscribed subcutaneous soft tissue lesion in the     upper back       partially imaged measuring 1.7 cm on image 1 which may relate to a sebaceous     cyst.             The trachea and mainstem bronchi are patent.  No consolidation or findings of     pneumonia.  Stable 9       mm right upper lobe nodule on image 31. No pneumothorax.  No pleural effusion.      No new pulmonary       nodule or mass.  Mild apical paraseptal emphysema.  The upper abdomen     demonstrates normal       visualized portions of the liver, spleen, adrenal glands, and pancreas.  No free    fluid or air in       the upper abdomen.  Negative for acute fracture.               CONCLUSION:         1. Stable 9 mm right upper lobe pulmonary nodule.  Recommend follow-up low-dose     screening CT in 6       months.        2. Mild emphysema.      3. Coronary atherosclerotic disease.              JULIANNA BAGLEY MD             Electronically Signed and Approved By: JULIANNA BAGLEY MD on 4/05/2021 at 8:17                               Until signed, this is an unconfirmed preliminary report that may contain      errors and is subject to change.                                              JULIANNAM:      D:04/05/21 0817            Assessment      Solitary lung nodule - R91.1            Notes      New Medications      * Fluticasone/Umeclidin/Vilanter (Trelegy Ellipta 100-62.5-25) 1 EACH       BLST.W.DEV: 1 PUFF INH RTQDAY #1         Dx: Solitary lung nodule - R91.1      Discontinued Medications      * Amoxicillin 875 MG TABLET: 875 MG PO BID 10 Days #20      *  Fluticasone/Umeclidin/Vilanter (Trelegy Ellipta 100-62.5-25) 1 EACH       BLST.W.DEV: 1 PUFF INH QDAY 30 Days #1      New Diagnostics      * Chest W/O Cont CT, Year         Dx: Solitary lung nodule - R91.1      IMPRESSION:      1.  Solitary lung nodule, 9 mm, PET negative, stable in size for one year.      2. Emphysema.  Could not do PFTs due to cost.  Well controlled with Trelegy.      COPD assessment test score is 7 signifying great disease control.      3. Tobacco abuse with cigarettes, ongoing, but trying to cut back.      4. Morbid obesity, BMI 41.4.      5. Chronic dyspnea at baseline.               PLAN:      1.  In this high risk patient we will check noncontrast chest CT in one year per    Fleischner criteria.  At this point this will be two years of stability and we     can enroll patient in lung cancer screening the following year. The patient     verbalized understanding regarding this.      2. Continue Trelegy 100 one puff daily with albuterol as needed.      3. If insurance changes or becomes more affordable then we can arrange PFT and s    ix minute walk test.      4. Smoking cessation counseling provided.  I spent 4 minutes today counseling     the patient on risks of smoking, including throat cancer, lung cancer, COPD,     heart disease and death. I also discussed the benefits of quitting.  He cannot h    ave Chantix because he is  and declines Wellbutrin or nicotine     patches.  I gave him the [phone] number.      5. Four minutes of diet and exercise counseling provided today.  Recommend 30     minutes of daily exercise as well as a 1800 calorie a day low fat diet.  The     patient verbalized understanding and will make attempts to lose weight.        6.  He refuses all vaccinations for flu, Prevnar and Pneumovax.      7. He can see us in one year.            Patient Education      ACO BMI High above 25:  Counseling Given, Encouraged weight loss, Encourage     dietary changes       Tobacco Cessation Counseling:  for 3 - 10 minutes      Patient Education Provided:  Smoking Cessation            Electronically signed by CLEMENCIA HENNING  05/04/2021 13:26       Disclaimer: Converted document may not contain table formatting or lab diagrams. Please see [employer] [facility] for the authenticated document.

## 2021-08-02 RX ORDER — IRBESARTAN 75 MG/1
75 TABLET ORAL DAILY
COMMUNITY
Start: 2021-06-26 | End: 2021-08-02 | Stop reason: SDUPTHER

## 2021-08-02 RX ORDER — IRBESARTAN 75 MG/1
75 TABLET ORAL DAILY
Qty: 30 TABLET | Refills: 1 | Status: SHIPPED | OUTPATIENT
Start: 2021-08-02 | End: 2021-09-28 | Stop reason: SDUPTHER

## 2021-08-02 RX ORDER — AMLODIPINE BESYLATE 5 MG/1
5 TABLET ORAL DAILY
Qty: 30 TABLET | Refills: 1 | Status: SHIPPED | OUTPATIENT
Start: 2021-08-02 | End: 2021-09-28 | Stop reason: SDUPTHER

## 2021-08-02 RX ORDER — ATORVASTATIN CALCIUM 20 MG/1
20 TABLET, FILM COATED ORAL DAILY
COMMUNITY
Start: 2021-06-30 | End: 2021-09-28 | Stop reason: SDUPTHER

## 2021-08-02 RX ORDER — AMLODIPINE BESYLATE 5 MG/1
5 TABLET ORAL DAILY
COMMUNITY
Start: 2021-06-26 | End: 2021-08-02 | Stop reason: SDUPTHER

## 2021-08-02 NOTE — TELEPHONE ENCOUNTER
Patient called requesting refills on Irbesartan 75 mg, Amlodipine 5 mg to [facility] at [address] and [address]

## 2021-08-11 ENCOUNTER — HOSPITAL ENCOUNTER (EMERGENCY)
Facility: HOSPITAL | Age: 61
Discharge: HOME | End: 2021-08-11
Attending: EMERGENCY MEDICINE | Admitting: EMERGENCY MEDICINE

## 2021-08-11 ENCOUNTER — APPOINTMENT (OUTPATIENT)
Dept: GENERAL RADIOLOGY | Facility: HOSPITAL | Age: 61
End: 2021-08-11

## 2021-08-11 VITALS
WEIGHT: 299.83 LBS | TEMPERATURE: 97.7 F | OXYGEN SATURATION: 98 % | SYSTOLIC BLOOD PRESSURE: 140 MMHG | HEART RATE: 88 BPM | BODY MASS INDEX: 40.61 KG/M2 | DIASTOLIC BLOOD PRESSURE: 79 MMHG | HEIGHT: 72 IN | RESPIRATION RATE: 16 BRPM
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
## 2021-08-11 DIAGNOSIS — S39.012A STRAIN OF LUMBAR REGION, INITIAL ENCOUNTER: ICD-10-CM

## 2021-08-11 DIAGNOSIS — S22.32XA CLOSED FRACTURE OF ONE RIB OF LEFT SIDE, INITIAL ENCOUNTER: Primary | ICD-10-CM

## 2021-08-11 PROCEDURE — 72100 X-RAY EXAM L-S SPINE 2/3 VWS: CPT

## 2021-08-11 PROCEDURE — 71101 X-RAY EXAM UNILAT RIBS/CHEST: CPT

## 2021-08-11 PROCEDURE — 99283 EMERGENCY DEPT VISIT LOW MDM: CPT

## 2021-08-11 RX ORDER — CYCLOBENZAPRINE HCL 10 MG
10 TABLET ORAL 3 TIMES DAILY PRN
Qty: 20 TABLET | Refills: 0 | Status: SHIPPED | OUTPATIENT
Start: 2021-08-11 | End: 2021-09-28

## 2021-08-11 RX ORDER — KETOROLAC TROMETHAMINE 30 MG/ML
60 INJECTION, SOLUTION INTRAMUSCULAR; INTRAVENOUS ONCE
Status: DISCONTINUED | OUTPATIENT
Start: 2021-08-11 | End: 2021-08-11

## 2021-08-11 RX ORDER — DEXAMETHASONE SODIUM PHOSPHATE 10 MG/ML
10 INJECTION INTRAMUSCULAR; INTRAVENOUS ONCE
Status: DISCONTINUED | OUTPATIENT
Start: 2021-08-11 | End: 2021-08-11

## 2021-08-11 RX ORDER — PREDNISONE 20 MG/1
20 TABLET ORAL
Qty: 15 TABLET | Refills: 0 | Status: SHIPPED | OUTPATIENT
Start: 2021-08-11 | End: 2021-08-16

## 2021-08-11 RX ORDER — CYCLOBENZAPRINE HCL 10 MG
10 TABLET ORAL ONCE
Status: COMPLETED | OUTPATIENT
Start: 2021-08-11 | End: 2021-08-11

## 2021-08-11 RX ORDER — IBUPROFEN 400 MG/1
800 TABLET ORAL ONCE
Status: COMPLETED | OUTPATIENT
Start: 2021-08-11 | End: 2021-08-11

## 2021-08-11 RX ADMIN — CYCLOBENZAPRINE 10 MG: 10 TABLET, FILM COATED ORAL at 12:16

## 2021-08-11 RX ADMIN — IBUPROFEN 800 MG: 400 TABLET, FILM COATED ORAL at 12:16

## 2021-08-11 NOTE — DISCHARGE INSTRUCTIONS
Drink plenty of fluids, take the diclofenac for pain and the flexeril for muscle spasm. Use the incentive spirometer every hour or 2 while awake as discussed.

## 2021-08-11 NOTE — ED PROVIDER NOTES
Subjective   Fell out of his bed about 1 week ago, struck left rib/abdominal area on bedside table. Pain in left ribs and lower back.      History provided by:  Patient   used: No        Review of Systems   Constitutional: Negative.    HENT: Negative.    Eyes: Negative.    Respiratory: Negative.    Cardiovascular: Negative.    Gastrointestinal: Negative.    Endocrine: Negative.    Genitourinary: Negative.    Musculoskeletal: Positive for back pain.   Skin: Negative.    Allergic/Immunologic: Negative.    Neurological: Negative.    Hematological: Negative.    Psychiatric/Behavioral: Negative.        Past Medical History:   Diagnosis Date   • Hypertension        No Known Allergies    History reviewed. No pertinent surgical history.    History reviewed. No pertinent family history.    Social History     Socioeconomic History   • Marital status:      Spouse name: Not on file   • Number of children: Not on file   • Years of education: Not on file   • Highest education level: Not on file   Tobacco Use   • Smoking status: Current Every Day Smoker     Packs/day: 2.00     Types: Cigarettes   Substance and Sexual Activity   • Alcohol use: Yes     Alcohol/week: 3.0 standard drinks     Types: 3 Standard drinks or equivalent per week   • Drug use: Never   • Sexual activity: Defer           Objective   Physical Exam  Constitutional:       Appearance: Normal appearance.   HENT:      Head: Normocephalic and atraumatic.      Nose: Nose normal.      Mouth/Throat:      Mouth: Mucous membranes are moist.   Eyes:      Pupils: Pupils are equal, round, and reactive to light.   Cardiovascular:      Rate and Rhythm: Normal rate and regular rhythm.      Pulses: Normal pulses.   Pulmonary:      Effort: Pulmonary effort is normal.      Breath sounds: Normal breath sounds.   Chest:       Abdominal:      General: Abdomen is flat. Bowel sounds are normal.      Palpations: Abdomen is soft.   Musculoskeletal:      Cervical  back: Normal range of motion.        Back:    Skin:     General: Skin is warm and dry.      Capillary Refill: Capillary refill takes less than 2 seconds.   Neurological:      General: No focal deficit present.      Mental Status: He is alert and oriented to person, place, and time. Mental status is at baseline.   Psychiatric:         Mood and Affect: Mood normal.         Procedures           ED Course                                           MDM  Number of Diagnoses or Management Options  Closed fracture of one rib of left side, initial encounter: new and requires workup  Strain of lumbar region, initial encounter: new and requires workup     Amount and/or Complexity of Data Reviewed  Tests in the radiology section of CPT®: reviewed and ordered    Risk of Complications, Morbidity, and/or Mortality  Presenting problems: low  Diagnostic procedures: low  Management options: low    Patient Progress  Patient progress: stable      Final diagnoses:   Closed fracture of one rib of left side, initial encounter   Fall from bed, initial encounter   Strain of lumbar region, initial encounter       ED Disposition  ED Disposition     ED Disposition Condition Comment    Discharge Stable           ElSiav dean MD  [address] [address]  [phone]    In 1 week           Medication List      New Prescriptions    cyclobenzaprine 10 MG tablet  Commonly known as: FLEXERIL  Take 1 tablet by mouth 3 (Three) Times a Day As Needed for Muscle Spasms.     diclofenac 50 MG EC tablet  Commonly known as: VOLTAREN  Take 1 tablet by mouth 3 (Three) Times a Day.     predniSONE 20 MG tablet  Commonly known as: DELTASONE  Take 1 tablet by mouth 3 (Three) Times a Day With Meals for 5 days.           Where to Get Your Medications      These medications were sent to [id number] - [place], [place] - [address] AT [facility] [phone] [facility] [phone] FX  [address],  [place] [address]    Phone: [phone]   · cyclobenzaprine 10 MG tablet  · diclofenac 50 MG EC tablet  · predniSONE 20 MG tablet          Brittney Grullon, TOBIN  08/11/21 1220

## 2021-09-03 RX ORDER — AMLODIPINE BESYLATE 5 MG/1
5 TABLET ORAL DAILY
Qty: 30 TABLET | Refills: 1 | OUTPATIENT
Start: 2021-09-03

## 2021-09-03 RX ORDER — IRBESARTAN 75 MG/1
TABLET ORAL
Qty: 30 TABLET | Refills: 1 | OUTPATIENT
Start: 2021-09-03

## 2021-09-27 ENCOUNTER — TRANSCRIBE ORDERS (OUTPATIENT)
Dept: LAB | Facility: HOSPITAL | Age: 61
End: 2021-09-27

## 2021-09-27 ENCOUNTER — LAB (OUTPATIENT)
Dept: LAB | Facility: HOSPITAL | Age: 61
End: 2021-09-27

## 2021-09-27 DIAGNOSIS — E78.2 MIXED HYPERLIPIDEMIA: ICD-10-CM

## 2021-09-27 DIAGNOSIS — I10 ESSENTIAL HYPERTENSION, MALIGNANT: Primary | ICD-10-CM

## 2021-09-27 DIAGNOSIS — I10 ESSENTIAL HYPERTENSION, MALIGNANT: ICD-10-CM

## 2021-09-27 LAB
ALBUMIN SERPL-MCNC: 4.5 G/DL (ref 3.5–5.2)
ALBUMIN/GLOB SERPL: 1.6 G/DL
ALP SERPL-CCNC: 118 U/L (ref 39–117)
ALT SERPL W P-5'-P-CCNC: 21 U/L (ref 1–41)
ANION GAP SERPL CALCULATED.3IONS-SCNC: 15.7 MMOL/L (ref 5–15)
AST SERPL-CCNC: 20 U/L (ref 1–40)
BILIRUB SERPL-MCNC: 0.6 MG/DL (ref 0–1.2)
BUN SERPL-MCNC: 14 MG/DL (ref 6–20)
BUN/CREAT SERPL: 14.6 (ref 7–25)
CALCIUM SPEC-SCNC: 9.2 MG/DL (ref 8.6–10.5)
CHLORIDE SERPL-SCNC: 101 MMOL/L (ref 98–107)
CHOLEST SERPL-MCNC: 194 MG/DL (ref 0–200)
CO2 SERPL-SCNC: 21.3 MMOL/L (ref 22–29)
CREAT SERPL-MCNC: 0.96 MG/DL (ref 0.76–1.27)
GFR SERPL CREATININE-BSD FRML MDRD: 80 ML/MIN/1.73
GLOBULIN UR ELPH-MCNC: 2.9 GM/DL
GLUCOSE SERPL-MCNC: 85 MG/DL (ref 65–99)
HDLC SERPL-MCNC: 44 MG/DL (ref 40–60)
LDLC SERPL CALC-MCNC: 121 MG/DL (ref 0–100)
LDLC/HDLC SERPL: 2.67 {RATIO}
POTASSIUM SERPL-SCNC: 4.4 MMOL/L (ref 3.5–5.2)
PROT SERPL-MCNC: 7.4 G/DL (ref 6–8.5)
SODIUM SERPL-SCNC: 138 MMOL/L (ref 136–145)
TRIGL SERPL-MCNC: 162 MG/DL (ref 0–150)
VLDLC SERPL-MCNC: 29 MG/DL (ref 5–40)

## 2021-09-27 PROCEDURE — 36415 COLL VENOUS BLD VENIPUNCTURE: CPT

## 2021-09-27 PROCEDURE — 80053 COMPREHEN METABOLIC PANEL: CPT

## 2021-09-27 PROCEDURE — 80061 LIPID PANEL: CPT

## 2021-09-28 ENCOUNTER — OFFICE VISIT (OUTPATIENT)
Dept: INTERNAL MEDICINE | Facility: CLINIC | Age: 61
End: 2021-09-28

## 2021-09-28 VITALS
OXYGEN SATURATION: 98 % | WEIGHT: 302 LBS | HEIGHT: 72 IN | DIASTOLIC BLOOD PRESSURE: 80 MMHG | BODY MASS INDEX: 40.9 KG/M2 | SYSTOLIC BLOOD PRESSURE: 150 MMHG | HEART RATE: 118 BPM | TEMPERATURE: 97.7 F

## 2021-09-28 DIAGNOSIS — I10 ESSENTIAL HYPERTENSION: Primary | ICD-10-CM

## 2021-09-28 DIAGNOSIS — J44.9 CHRONIC OBSTRUCTIVE PULMONARY DISEASE, UNSPECIFIED COPD TYPE (HCC): ICD-10-CM

## 2021-09-28 DIAGNOSIS — D75.1 SECONDARY POLYCYTHEMIA: ICD-10-CM

## 2021-09-28 DIAGNOSIS — E78.2 MIXED HYPERLIPIDEMIA: ICD-10-CM

## 2021-09-28 DIAGNOSIS — R91.1 PULMONARY NODULE: ICD-10-CM

## 2021-09-28 DIAGNOSIS — R73.01 IFG (IMPAIRED FASTING GLUCOSE): ICD-10-CM

## 2021-09-28 PROBLEM — Z00.00 WELL ADULT HEALTH CHECK: Status: ACTIVE | Noted: 2021-09-28

## 2021-09-28 PROBLEM — F17.200 TOBACCO USE DISORDER: Status: ACTIVE | Noted: 2021-09-28

## 2021-09-28 PROBLEM — E66.812 CLASS 2 OBESITY DUE TO EXCESS CALORIES WITHOUT SERIOUS COMORBIDITY WITH BODY MASS INDEX (BMI) OF 39.0 TO 39.9 IN ADULT: Status: ACTIVE | Noted: 2021-09-28

## 2021-09-28 PROBLEM — Z12.5 SCREENING FOR MALIGNANT NEOPLASM OF PROSTATE: Status: ACTIVE | Noted: 2021-09-28

## 2021-09-28 PROBLEM — E66.09 CLASS 2 OBESITY DUE TO EXCESS CALORIES WITHOUT SERIOUS COMORBIDITY WITH BODY MASS INDEX (BMI) OF 39.0 TO 39.9 IN ADULT: Status: ACTIVE | Noted: 2021-09-28

## 2021-09-28 PROCEDURE — 99214 OFFICE O/P EST MOD 30 MIN: CPT | Performed by: INTERNAL MEDICINE

## 2021-09-28 RX ORDER — AMLODIPINE BESYLATE 5 MG/1
5 TABLET ORAL DAILY
Qty: 30 TABLET | Refills: 3 | Status: SHIPPED | OUTPATIENT
Start: 2021-09-28 | End: 2022-01-05

## 2021-09-28 RX ORDER — ATORVASTATIN CALCIUM 20 MG/1
20 TABLET, FILM COATED ORAL DAILY
Qty: 30 TABLET | Refills: 3 | Status: SHIPPED | OUTPATIENT
Start: 2021-09-28 | End: 2021-09-28

## 2021-09-28 RX ORDER — IRBESARTAN 75 MG/1
75 TABLET ORAL DAILY
Qty: 30 TABLET | Refills: 3 | Status: SHIPPED | OUTPATIENT
Start: 2021-09-28 | End: 2022-01-05

## 2021-09-28 RX ORDER — ATORVASTATIN CALCIUM 20 MG/1
TABLET, FILM COATED ORAL
Qty: 90 TABLET | OUTPATIENT
Start: 2021-09-28

## 2021-09-28 RX ORDER — ATORVASTATIN CALCIUM 40 MG/1
40 TABLET, FILM COATED ORAL DAILY
Qty: 30 TABLET | Refills: 3 | Status: SHIPPED | OUTPATIENT
Start: 2021-09-28 | End: 2022-01-05

## 2021-09-28 NOTE — PROGRESS NOTES
"Chief Complaint  Follow-up (cracked rib, left side )    Subjective          Sukhdeep Valladares presents to [facility]     History of Present Illness    Patient is a 61-year-old male with underlying hypertension, hyperlipidemia, IFG, who is coming in for routine 3-month follow-up.  We review his labs, any new concerns he may have, and make further recommendations at that time.    Review of Systems   Constitutional: Negative for appetite change, fatigue and fever.   HENT: Negative for congestion and ear pain.    Eyes: Negative for blurred vision.   Respiratory: Negative for cough, chest tightness, shortness of breath and wheezing.    Cardiovascular: Negative for chest pain, palpitations and leg swelling.   Gastrointestinal: Negative for abdominal pain.   Genitourinary: Negative for difficulty urinating, dysuria and hematuria.   Musculoskeletal: Negative for arthralgias and gait problem.   Skin: Negative for skin lesions.   Neurological: Negative for syncope, memory problem and confusion.   Psychiatric/Behavioral: Negative for self-injury and depressed mood.       Objective   Vital Signs:   /80 (BP Location: Right arm, Patient Position: Sitting, Cuff Size: Adult)   Pulse 118   Temp 97.7 °F (36.5 °C) (Temporal)   Ht 182.9 cm (72\")   Wt (!) 137 kg (302 lb)   SpO2 98%   BMI 40.96 kg/m²           Physical Exam  Vitals and nursing note reviewed.   Constitutional:       General: He is not in acute distress.     Appearance: Normal appearance. He is not toxic-appearing.   HENT:      Head: Atraumatic.      Right Ear: External ear normal.      Left Ear: External ear normal.      Nose: Nose normal.      Mouth/Throat:      Mouth: Mucous membranes are moist.   Eyes:      General:         Right eye: No discharge.         Left eye: No discharge.      Extraocular Movements: Extraocular movements intact.      Pupils: Pupils are equal, round, and reactive to light.   Cardiovascular:      " Rate and Rhythm: Normal rate and regular rhythm.      Pulses: Normal pulses.      Heart sounds: Normal heart sounds. No murmur heard.   No gallop.    Pulmonary:      Effort: Pulmonary effort is normal. No respiratory distress.      Breath sounds: No wheezing, rhonchi or rales.   Abdominal:      General: There is no distension.      Palpations: Abdomen is soft. There is no mass.      Tenderness: There is no abdominal tenderness. There is no guarding.   Musculoskeletal:         General: No swelling or tenderness.      Cervical back: No tenderness.      Right lower leg: No edema.      Left lower leg: No edema.   Skin:     General: Skin is warm and dry.      Findings: No rash.   Neurological:      General: No focal deficit present.      Mental Status: He is alert and oriented to person, place, and time. Mental status is at baseline.      Motor: No weakness.      Gait: Gait normal.   Psychiatric:         Mood and Affect: Mood normal.         Thought Content: Thought content normal.          Result Review :   The following data was reviewed by: Siva Gallegos MD on 09/28/2021:                  Assessment and Plan    Diagnoses and all orders for this visit:    1. Essential hypertension (Primary)  Overview:  Blood pressure with reasonable control as of 9/21.  Patient is stable on low-dose ARB losartan and moderate dose amlodipine.  Continue same.    Orders:  -     Comprehensive Metabolic Panel; Future    2. Mixed hyperlipidemia  Overview:  LDL is only down from 140 to 120 as of 9/21 office visit.  Patient was on 20 mg of Lipitor, we need to increase to 40 mg at this time.    Orders:  -     Lipid Panel; Future    3. Secondary polycythemia  Overview:  Hemoglobin was 15.4 earlier in the year, we will repeated on return to office next year.    Orders:  -     CBC & Differential; Future    4. IFG (impaired fasting glucose)  Overview:  Fasting blood sugar was only 85, so this appears to be a nonissue presently.  We will continue to  keep an eye on it periodically.    Orders:  -     Hemoglobin A1c; Future    5. Chronic obstructive pulmonary disease, unspecified COPD type (HCC)  Overview:  Patient remains compliant with Trelegy inhaler, is relatively stable on this, so continue same.      6. Pulmonary nodule  Overview:  CT 4/21 with stable 9 mm=f/u 6 mo = we will schedule now as of his 9/21 office visit.        Other orders  -     Fluticasone-Umeclidin-Vilant (Trelegy Ellipta) 100-62.5-25 MCG/INH inhaler; Inhale 1 puff Daily.  Dispense: 1 each; Refill: 3  -     irbesartan (AVAPRO) 75 MG tablet; Take 1 tablet by mouth Daily.  Dispense: 30 tablet; Refill: 3  -     amLODIPine (NORVASC) 5 MG tablet; Take 1 tablet by mouth Daily.  Dispense: 30 tablet; Refill: 3  -     Discontinue: atorvastatin (LIPITOR) 20 MG tablet; Take 1 tablet by mouth Daily.  Dispense: 30 tablet; Refill: 3  -     atorvastatin (LIPITOR) 40 MG tablet; Take 1 tablet by mouth Daily.  Dispense: 30 tablet; Refill: 3       --  --  VISIT 6/21:  NEW PT EXAM 6/20 and needs all labs; no ischemic CP; chronic CHAVEZ.  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  --  LIPIDS with  and I d/w him diet needed 6/20---> LDL was 141 in 3/21, so I started statin then. (TSH neg 6/20).  IFG = A1C 5.9---> 5.5 in 3/21.  --  LEUKOCYTOSIS is very mild, nl diff, ne g h/o, so just repeat labs later date---> wnl 3/21.  POLYCYTHEMIA = f/u on RTO as well=will get in '21---> neg 3/21.  --  COPD = [facility] for below---> per PULM now=we will jeimy him as of 3/21 OV since he missed his appt.  TOBACCO USE D/O = smoker 40 yrs; as above...has 9 mm RUL MASS with neg PET per his  report; has f/u with PULM for repeat CT in 3 mo...I will get CT now 3/21 since he did not f/u with them as jeimy.---> CT 4/21 with stable 9 mm=f/u 6 mo.  --  MORBID [id number] with BMI 39.  --  BPH sxs as of 3/21 OV and I d/w he needs labs today and then call us for the results.  --  --  PSA 3.0 (3/29/21).  COLON 4/16 = 13 polyps=needs to be seen soon as of 6/20 OV, but will address BP first.  COVID rec 6/21 and will only take XaviJ.  ( 12/19 after together since '04;  10 yrs and was in [employer] prior; girl is 26; other girl passed heroin OD In '16).    Follow Up   Return in about 4 months (around 1/28/2022).  Patient was given instructions and counseling regarding his condition or for health maintenance advice. Please see specific information pulled into the AVS if appropriate.

## 2022-01-05 RX ORDER — IRBESARTAN 75 MG/1
75 TABLET ORAL DAILY
Qty: 30 TABLET | Refills: 5 | Status: SHIPPED | OUTPATIENT
Start: 2022-01-05 | End: 2022-10-17 | Stop reason: SDUPTHER

## 2022-01-05 RX ORDER — AMLODIPINE BESYLATE 5 MG/1
5 TABLET ORAL DAILY
Qty: 30 TABLET | Refills: 5 | Status: SHIPPED | OUTPATIENT
Start: 2022-01-05 | End: 2022-10-17 | Stop reason: SDUPTHER

## 2022-01-05 RX ORDER — ATORVASTATIN CALCIUM 40 MG/1
40 TABLET, FILM COATED ORAL DAILY
Qty: 30 TABLET | Refills: 5 | Status: SHIPPED | OUTPATIENT
Start: 2022-01-05 | End: 2022-10-17 | Stop reason: SDUPTHER

## 2022-01-23 NOTE — ASSESSMENT & PLAN NOTE
LDL is only down from 140 to 120 as of 9/21 office visit.  Patient was on 20 mg of Lipitor, we need to increase to 40 mg at this time.---> Labs pending as of 1/22 office visit, patient will get them done this week and I asked him to call for the results.  Otherwise, continue with 40 mg of Lipitor for now.

## 2022-01-23 NOTE — ASSESSMENT & PLAN NOTE
Hemoglobin was 15.4 earlier in the year, we will repeated on return to office next year.---> Robson, labs pending 1/22.

## 2022-01-23 NOTE — PROGRESS NOTES
"Chief Complaint  Hyperlipidemia and Follow-up (No new issues)    Subjective          Sukhdeep Valladares presents to [facility]     History of Present Illness  Patient is a 61-year-old male with underlying hypertension, hyperlipidemia, IFG, who is coming in 1/22 for routine 4-month follow-up.  We will review his labs, any new concerns he may have, and make further recommendations at that time.    Review of Systems   Constitutional: Negative for appetite change, fatigue and fever.   HENT: Negative for congestion and ear pain.    Eyes: Negative for blurred vision.   Respiratory: Negative for cough, chest tightness, shortness of breath and wheezing.    Cardiovascular: Negative for chest pain, palpitations and leg swelling.   Gastrointestinal: Negative for abdominal pain.   Genitourinary: Negative for difficulty urinating, dysuria and hematuria.   Musculoskeletal: Negative for arthralgias and gait problem.   Skin: Negative for skin lesions.   Neurological: Negative for syncope, memory problem and confusion.   Psychiatric/Behavioral: Negative for self-injury and depressed mood.       Objective   Vital Signs:   /92   Pulse 102   Temp 99 °F (37.2 °C)   Ht 182.9 cm (72.01\")   Wt (!) 137 kg (301 lb 3.2 oz)   SpO2 98%   BMI 40.84 kg/m²           Physical Exam  Vitals and nursing note reviewed.   Constitutional:       General: He is not in acute distress.     Appearance: Normal appearance. He is not toxic-appearing.   HENT:      Head: Atraumatic.      Right Ear: External ear normal.      Left Ear: External ear normal.      Nose: Nose normal.      Mouth/Throat:      Mouth: Mucous membranes are moist.   Eyes:      General:         Right eye: No discharge.         Left eye: No discharge.      Extraocular Movements: Extraocular movements intact.      Pupils: Pupils are equal, round, and reactive to light.   Cardiovascular:      Rate and Rhythm: Normal rate and regular rhythm.      " Pulses: Normal pulses.      Heart sounds: Normal heart sounds. No murmur heard.  No gallop.       Comments: Heart tones normal, no ectopy, no S3.  Pulmonary:      Effort: Pulmonary effort is normal. No respiratory distress.      Breath sounds: Rhonchi present. No wheezing or rales.      Comments: Coarse breath sounds, but otherwise clear.  Certainly no labored respirations.  Abdominal:      General: There is no distension.      Palpations: Abdomen is soft. There is no mass.      Tenderness: There is no abdominal tenderness. There is no guarding.   Musculoskeletal:         General: No swelling or tenderness.      Cervical back: No tenderness.      Right lower leg: No edema.      Left lower leg: No edema.   Skin:     General: Skin is warm and dry.      Findings: No rash.   Neurological:      General: No focal deficit present.      Mental Status: He is alert and oriented to person, place, and time. Mental status is at baseline.      Motor: No weakness.      Gait: Gait normal.   Psychiatric:         Mood and Affect: Mood normal.         Thought Content: Thought content normal.          Result Review :   The following data was reviewed by: Siva Gallegos MD on 09/28/2021:                  Assessment and Plan    Diagnoses and all orders for this visit:    1. Essential hypertension (Primary)  Assessment & Plan:  Blood pressure is up a bit as of 1/22, but was reasonable last office visit.  Patient does report missing his medications some days, so difficult to titrated at this time.  Patient stable to continue low-dose ARB and moderate dose amlodipine, discussed with him getting a pillbox etc. to try to help keep things on track.  Follow-up on return to office.        2. Mixed hyperlipidemia  Assessment & Plan:  LDL is only down from 140 to 120 as of 9/21 office visit.  Patient was on 20 mg of Lipitor, we need to increase to 40 mg at this time.---> Labs pending as of 1/22 office visit, patient will get them done this week and  I asked him to call for the results.  Otherwise, continue with 40 mg of Lipitor for now.        3. Secondary polycythemia  Assessment & Plan:  Hemoglobin was 15.4 earlier in the year, we will repeated on return to office next year.---> Yanao, labs pending 1/22.        4. IFG (impaired fasting glucose)  Assessment & Plan:  Fasting blood sugar was only 85, so this appears to be a nonissue presently.  We will continue to keep an eye on it periodically.---> Labs pending as of 1/22 office visit, patient is  just recently got back into town.        5. Chronic bronchitis, unspecified chronic bronchitis type (HCC)  Assessment & Plan:  Patient remains compliant with Trelegy inhaler, is relatively stable on this, so continue same---> patient still without any significant albuterol use as of 1/22, continue with Trelegy daily as prescribed.        6. Pulmonary nodule  Overview:  CT 4/21 with stable 9 mm=f/u 6 mo = we will schedule now as of his 9/21 office visit---> this is on hold due to insurance, patient has a large deductible this regards.  Patient no new symptoms at least, certainly no hemoptysis etc as of 1/22.        7. Adenomatous polyp of colon, unspecified part of colon  Overview:  Patient had 13 polyps in 2016, and we agreed that it is past time for this to be repeated as of 1/22.  Referral placed.    Orders:  -     Ambulatory Referral to Gastroenterology     --  --  Older notes:  VISIT 6/21:  NEW PT EXAM 6/20 and needs all labs; no ischemic CP; chronic CHAVEZ.  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  --  LIPIDS with  and I d/w him diet needed 6/20---> LDL was 141 in 3/21, so I started  statin then. (TSH neg 6/20).  IFG = A1C 5.9---> 5.5 in 3/21.  --  LEUKOCYTOSIS is very mild, nl diff, ne g h/o, so just repeat labs later date---> wnl 3/21.  POLYCYTHEMIA = f/u on RTO as well=will get in '21---> neg 3/21.  --  COPD = [facility]; [facility] CT for below---> per PULM now=we will jeimy him as of 3/21 OV since he missed his appt.  TOBACCO USE D/O = smoker 40 yrs; as above...has 9 mm RUL MASS with neg PET per his report; has f/u with PULM for repeat CT in 3 mo...I will get CT now 3/21 since he did not f/u with them as jeimy.---> CT 4/21 with stable 9 mm=f/u 6 mo.  --  MORBID VLKCCNV=149 with BMI 39.  --  BPH sxs as of 3/21 OV and I d/w he needs labs today and then call us for the results.  --  --  PSA 3.0 (3/29/21).  COLON 4/16 = 13 polyps=needs to be seen soon as of 6/20 OV, but will address BP first.---> Referral placed 1/22.  COVID rec 6/21 and will only take Thuy.  ( 12/19 after together since '04;  10 yrs and was in [employer] prior; girl is 26; other girl passed heroin OD In '16).    Follow Up   Return in about 4 months (around 5/24/2022).  Patient was given instructions and counseling regarding his condition or for health maintenance advice. Please see specific information pulled into the AVS if appropriate.

## 2022-01-23 NOTE — ASSESSMENT & PLAN NOTE
Fasting blood sugar was only 85, so this appears to be a nonissue presently.  We will continue to keep an eye on it periodically.---> Labs pending as of 1/22 office visit, patient is  just recently got back into town.

## 2022-01-23 NOTE — ASSESSMENT & PLAN NOTE
Patient remains compliant with Trelegy inhaler, is relatively stable on this, so continue same---> patient still without any significant albuterol use as of 1/22, continue with Trelegy daily as prescribed.

## 2022-01-23 NOTE — ASSESSMENT & PLAN NOTE
Blood pressure is up a bit as of 1/22, but was reasonable last office visit.  Patient does report missing his medications some days, so difficult to titrated at this time.  Patient stable to continue low-dose ARB and moderate dose amlodipine, discussed with him getting a pillbox etc. to try to help keep things on track.  Follow-up on return to office.

## 2022-01-24 ENCOUNTER — OFFICE VISIT (OUTPATIENT)
Dept: INTERNAL MEDICINE | Facility: CLINIC | Age: 62
End: 2022-01-24

## 2022-01-24 VITALS
HEART RATE: 102 BPM | SYSTOLIC BLOOD PRESSURE: 142 MMHG | WEIGHT: 301.2 LBS | TEMPERATURE: 99 F | DIASTOLIC BLOOD PRESSURE: 92 MMHG | OXYGEN SATURATION: 98 % | HEIGHT: 72 IN | BODY MASS INDEX: 40.8 KG/M2

## 2022-01-24 DIAGNOSIS — R91.1 PULMONARY NODULE: ICD-10-CM

## 2022-01-24 DIAGNOSIS — D12.6 ADENOMATOUS POLYP OF COLON, UNSPECIFIED PART OF COLON: ICD-10-CM

## 2022-01-24 DIAGNOSIS — I10 ESSENTIAL HYPERTENSION: Primary | ICD-10-CM

## 2022-01-24 DIAGNOSIS — D75.1 SECONDARY POLYCYTHEMIA: ICD-10-CM

## 2022-01-24 DIAGNOSIS — J42 CHRONIC BRONCHITIS, UNSPECIFIED CHRONIC BRONCHITIS TYPE: ICD-10-CM

## 2022-01-24 DIAGNOSIS — R73.01 IFG (IMPAIRED FASTING GLUCOSE): ICD-10-CM

## 2022-01-24 DIAGNOSIS — E78.2 MIXED HYPERLIPIDEMIA: ICD-10-CM

## 2022-01-24 PROCEDURE — 99214 OFFICE O/P EST MOD 30 MIN: CPT | Performed by: INTERNAL MEDICINE

## 2022-01-25 ENCOUNTER — LAB (OUTPATIENT)
Dept: LAB | Facility: HOSPITAL | Age: 62
End: 2022-01-25

## 2022-01-25 DIAGNOSIS — I10 ESSENTIAL HYPERTENSION: ICD-10-CM

## 2022-01-25 DIAGNOSIS — E78.2 MIXED HYPERLIPIDEMIA: ICD-10-CM

## 2022-01-25 DIAGNOSIS — R73.01 IFG (IMPAIRED FASTING GLUCOSE): ICD-10-CM

## 2022-01-25 DIAGNOSIS — D75.1 SECONDARY POLYCYTHEMIA: ICD-10-CM

## 2022-01-25 LAB
ALBUMIN SERPL-MCNC: 4.2 G/DL (ref 3.5–5.2)
ALBUMIN/GLOB SERPL: 1.6 G/DL
ALP SERPL-CCNC: 106 U/L (ref 39–117)
ALT SERPL W P-5'-P-CCNC: 24 U/L (ref 1–41)
ANION GAP SERPL CALCULATED.3IONS-SCNC: 11 MMOL/L (ref 5–15)
AST SERPL-CCNC: 16 U/L (ref 1–40)
BASOPHILS # BLD AUTO: 0.12 10*3/MM3 (ref 0–0.2)
BASOPHILS NFR BLD AUTO: 1.4 % (ref 0–1.5)
BILIRUB SERPL-MCNC: 0.5 MG/DL (ref 0–1.2)
BUN SERPL-MCNC: 10 MG/DL (ref 6–20)
BUN/CREAT SERPL: 10.5 (ref 7–25)
CALCIUM SPEC-SCNC: 9.2 MG/DL (ref 8.6–10.5)
CHLORIDE SERPL-SCNC: 103 MMOL/L (ref 98–107)
CHOLEST SERPL-MCNC: 178 MG/DL (ref 0–200)
CO2 SERPL-SCNC: 24 MMOL/L (ref 22–29)
CREAT SERPL-MCNC: 0.95 MG/DL (ref 0.76–1.27)
DEPRECATED RDW RBC AUTO: 42 FL (ref 37–54)
EOSINOPHIL # BLD AUTO: 0.28 10*3/MM3 (ref 0–0.4)
EOSINOPHIL NFR BLD AUTO: 3.3 % (ref 0.3–6.2)
ERYTHROCYTE [DISTWIDTH] IN BLOOD BY AUTOMATED COUNT: 12.9 % (ref 12.3–15.4)
GFR SERPL CREATININE-BSD FRML MDRD: 81 ML/MIN/1.73
GLOBULIN UR ELPH-MCNC: 2.7 GM/DL
GLUCOSE SERPL-MCNC: 96 MG/DL (ref 65–99)
HBA1C MFR BLD: 6 % (ref 4.8–5.6)
HCT VFR BLD AUTO: 48.9 % (ref 37.5–51)
HDLC SERPL-MCNC: 38 MG/DL (ref 40–60)
HGB BLD-MCNC: 16.7 G/DL (ref 13–17.7)
IMM GRANULOCYTES # BLD AUTO: 0.02 10*3/MM3 (ref 0–0.05)
IMM GRANULOCYTES NFR BLD AUTO: 0.2 % (ref 0–0.5)
LDLC SERPL CALC-MCNC: 115 MG/DL (ref 0–100)
LDLC/HDLC SERPL: 2.96 {RATIO}
LYMPHOCYTES # BLD AUTO: 2.64 10*3/MM3 (ref 0.7–3.1)
LYMPHOCYTES NFR BLD AUTO: 30.9 % (ref 19.6–45.3)
MCH RBC QN AUTO: 30.6 PG (ref 26.6–33)
MCHC RBC AUTO-ENTMCNC: 34.2 G/DL (ref 31.5–35.7)
MCV RBC AUTO: 89.6 FL (ref 79–97)
MONOCYTES # BLD AUTO: 0.56 10*3/MM3 (ref 0.1–0.9)
MONOCYTES NFR BLD AUTO: 6.6 % (ref 5–12)
NEUTROPHILS NFR BLD AUTO: 4.91 10*3/MM3 (ref 1.7–7)
NEUTROPHILS NFR BLD AUTO: 57.6 % (ref 42.7–76)
NRBC BLD AUTO-RTO: 0 /100 WBC (ref 0–0.2)
PLATELET # BLD AUTO: 287 10*3/MM3 (ref 140–450)
PMV BLD AUTO: 10.2 FL (ref 6–12)
POTASSIUM SERPL-SCNC: 4.6 MMOL/L (ref 3.5–5.2)
PROT SERPL-MCNC: 6.9 G/DL (ref 6–8.5)
RBC # BLD AUTO: 5.46 10*6/MM3 (ref 4.14–5.8)
SODIUM SERPL-SCNC: 138 MMOL/L (ref 136–145)
TRIGL SERPL-MCNC: 137 MG/DL (ref 0–150)
VLDLC SERPL-MCNC: 25 MG/DL (ref 5–40)
WBC NRBC COR # BLD: 8.53 10*3/MM3 (ref 3.4–10.8)

## 2022-01-25 PROCEDURE — 36415 COLL VENOUS BLD VENIPUNCTURE: CPT

## 2022-01-25 PROCEDURE — 80061 LIPID PANEL: CPT

## 2022-01-25 PROCEDURE — 83036 HEMOGLOBIN GLYCOSYLATED A1C: CPT

## 2022-01-25 PROCEDURE — 85025 COMPLETE CBC W/AUTO DIFF WBC: CPT

## 2022-01-25 PROCEDURE — 80053 COMPREHEN METABOLIC PANEL: CPT

## 2022-05-03 ENCOUNTER — APPOINTMENT (OUTPATIENT)
Dept: CT IMAGING | Facility: HOSPITAL | Age: 62
End: 2022-05-03

## 2022-10-16 NOTE — PROGRESS NOTES
"Chief Complaint  Annual Exam (Pt states that he hasn't been here for a while due to he lost his insurance. His wife has Hep B and he is curious if he needs to be tested. )    Subjective          Sukhdeep Valladares presents to [facility]     History of Present Illness  Patient is a 62-year-old male with underlying hypertension, hyperlipidemia, IFG, who is coming in 10/22 for Annual Exam/routine 4-month follow-up.  We will review his labs, any new concerns he may have, and make further recommendations at that time.    Review of Systems   Constitutional: Negative for appetite change, fatigue and fever.   HENT: Negative for congestion and ear pain.    Eyes: Negative for blurred vision.   Respiratory: Negative for cough, chest tightness, shortness of breath and wheezing.    Cardiovascular: Negative for chest pain, palpitations and leg swelling.   Gastrointestinal: Negative for abdominal pain.   Genitourinary: Negative for difficulty urinating, dysuria and hematuria.   Musculoskeletal: Negative for arthralgias and gait problem.   Skin: Negative for skin lesions.   Neurological: Negative for syncope, memory problem and confusion.   Psychiatric/Behavioral: Negative for self-injury and depressed mood.       Objective   Vital Signs:   /84 (BP Location: Left arm, Patient Position: Sitting, Cuff Size: Large Adult)   Pulse 120   Temp 97.6 °F (36.4 °C) (Skin)   Ht 182.9 cm (72.01\")   Wt 131 kg (287 lb 12.8 oz)   SpO2 98%   BMI 39.02 kg/m²           Physical Exam  Vitals and nursing note reviewed.   Constitutional:       General: He is not in acute distress.     Appearance: Normal appearance. He is not toxic-appearing.   HENT:      Head: Atraumatic.      Right Ear: External ear normal.      Left Ear: External ear normal.      Nose: Nose normal.      Mouth/Throat:      Mouth: Mucous membranes are moist.   Eyes:      General:         Right eye: No discharge.         Left eye: No " discharge.      Extraocular Movements: Extraocular movements intact.      Pupils: Pupils are equal, round, and reactive to light.   Cardiovascular:      Rate and Rhythm: Normal rate and regular rhythm.      Pulses: Normal pulses.      Heart sounds: Normal heart sounds. No murmur heard.    No gallop.      Comments: Heart tones normal, no ectopy, no S3.  Pulmonary:      Effort: Pulmonary effort is normal. No respiratory distress.      Breath sounds: Rhonchi present. No wheezing or rales.      Comments: Coarse breath sounds, but otherwise clear.  Certainly no labored respirations.  Abdominal:      General: There is no distension.      Palpations: Abdomen is soft. There is no mass.      Tenderness: There is no abdominal tenderness. There is no guarding.   Musculoskeletal:         General: No swelling or tenderness.      Cervical back: No tenderness.      Right lower leg: No edema.      Left lower leg: No edema.   Skin:     General: Skin is warm and dry.      Findings: No rash.   Neurological:      General: No focal deficit present.      Mental Status: He is alert and oriented to person, place, and time. Mental status is at baseline.      Motor: No weakness.      Gait: Gait normal.   Psychiatric:         Mood and Affect: Mood normal.         Thought Content: Thought content normal.          Result Review :   The following data was reviewed by: Siva Gallegos MD on 09/28/2021:                  Assessment and Plan    Diagnoses and all orders for this visit:    1. Well adult health check (Primary)  Overview:  Preventive measures: were reviewed with the patient at this office visit.  They included but were not limited to discussions in regards to vaccines outstanding, auto safety with seat belts and other assistive devices, fall prevention, and routine screening studies.    Exercise: CHAVEZ = needs MDI. No CP per se.  Comprehensive labs: All needed again as of 10/22.    Covid vaccine:  J&J x1 only = no more.  Other vaccines:  Declined flu shots going forward 10/22.    PSA: 3.0 (3/29/21).  Colon:  4/16 = 13 polyps=needs to be seen soon as of 6/20 OV = order placed 1/22 = we will schedule again 10/22.    SH:  FH:    Orders:  -     Hepatitis panel, acute; Future  -     CBC & Differential; Future  -     Comprehensive Metabolic Panel; Future  -     Lipid Panel; Future  -     TSH+Free T4; Future    2. Chronic obstructive pulmonary disease, unspecified COPD type (HCC)  Assessment & Plan:  Patient has not the Trelegy dropped off, has been fairly stable using albuterol as needed.  Refill given 10/22.        3. Essential hypertension  Assessment & Plan:  Blood pressure is improved as of his 10/22 office visit.  He is compliant with low-dose irbesartan and moderate dose amlodipine, continue same going forward.      4. IFG (impaired fasting glucose)  Assessment & Plan:  Order written for A1c at his 10/22 office visit, asked patient to call for the results please.    Orders:  -     Hemoglobin A1c; Future    5. Mixed hyperlipidemia  Assessment & Plan:  LDL was 115 in 1/22, that was not at goal, but labs are needed, so we will continue current dose of 40 mg for now.      6. Encounter for screening for malignant neoplasm of colon  -     Cancel: Ambulatory Referral For Screening Colonoscopy  -     Ambulatory Referral For Screening Colonoscopy    7. Adenomatous polyp of colon, unspecified part of colon  Overview:  Patient had 13 polyps in 2016, and we agreed that it is past time for this to be repeated as of 1/22.  Referral placed.    Orders:  -     Ambulatory Referral For Screening Colonoscopy    8. Prostate cancer screening  -     PSA Screen; Future    9. Pulmonary nodule  Assessment & Plan:  CT 4/21 with stable 9 mm=f/u 6 mo = we will schedule now as of his 9/21 office visit...this is on hold due to insurance, patient has a large deductible this regards.  Patient no new symptoms at least, certainly no hemoptysis etc as of 1/22.---> Will place order  10/22.      Orders:  -     CT Chest Without Contrast Diagnostic; Future    Other orders  -     albuterol (PROAIR RESPICLICK) 108 (90 Base) MCG/ACT inhaler; Inhale 2 puffs Every 4 (Four) Hours As Needed for Wheezing.  Dispense: 1 each; Refill: 11  -     atorvastatin (LIPITOR) 40 MG tablet; Take 1 tablet by mouth Daily.  Dispense: 90 tablet; Refill: 1  -     irbesartan (AVAPRO) 75 MG tablet; Take 1 tablet by mouth Daily.  Dispense: 90 tablet; Refill: 1  -     amLODIPine (NORVASC) 5 MG tablet; Take 1 tablet by mouth Daily.  Dispense: 90 tablet; Refill: 1     --  --  Older notes:  VISIT 6/21:  NEW PT EXAM 6/20 and needs all labs; no ischemic CP; chronic CHAVEZ.  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  --  LIPIDS with  and I d/w him diet needed 6/20---> LDL was 141 in 3/21, so I started statin then. (TSH neg 6/20).  IFG = A1C 5.9---> 5.5 in 3/21.  --  LEUKOCYTOSIS is very mild, nl diff, ne g h/o, so just repeat labs later date---> wnl 3/21.  POLYCYTHEMIA = f/u on RTO as well=will get in '21---> neg 3/21.  --  COPD = [facility]; [facility] CT for below---> per PULM now=we will jeimy him as of 3/21 OV since he missed his appt.  TOBACCO USE D/O = smoker 40 yrs; as above...has 9 mm RUL MASS with neg PET per his report; has f/u with PULM for repeat CT in 3 mo...I will get CT now 3/21 since he did not f/u with them as jeimy.---> CT 4/21 with stable 9 mm=f/u 6 mo.  --  MORBID LKMQPXI=180 with BMI 39.  --  BPH sxs as of 3/21 OV and I d/w he needs labs today and then call us for the results.  --  --  PSA 3.0 (3/29/21).  COLON 4/16 = 13 polyps=needs to be seen soon as of 6/20 OV, but will address BP  first.---> Referral placed 1/22.  COVID rec 6/21 and will only take XaviJ.  ( 12/19 after together since '04;  10 yrs and was in [employer] prior; girl is 26; other girl passed heroin OD In '16).    Follow Up   Return in about 3 months (around 1/17/2023).  Patient was given instructions and counseling regarding his condition or for health maintenance advice. Please see specific information pulled into the AVS if appropriate.

## 2022-10-17 ENCOUNTER — OFFICE VISIT (OUTPATIENT)
Dept: INTERNAL MEDICINE | Facility: CLINIC | Age: 62
End: 2022-10-17

## 2022-10-17 VITALS
HEART RATE: 120 BPM | HEIGHT: 72 IN | SYSTOLIC BLOOD PRESSURE: 140 MMHG | OXYGEN SATURATION: 98 % | WEIGHT: 287.8 LBS | TEMPERATURE: 97.6 F | BODY MASS INDEX: 38.98 KG/M2 | DIASTOLIC BLOOD PRESSURE: 84 MMHG

## 2022-10-17 DIAGNOSIS — E78.2 MIXED HYPERLIPIDEMIA: ICD-10-CM

## 2022-10-17 DIAGNOSIS — Z12.11 ENCOUNTER FOR SCREENING FOR MALIGNANT NEOPLASM OF COLON: ICD-10-CM

## 2022-10-17 DIAGNOSIS — R91.1 PULMONARY NODULE: ICD-10-CM

## 2022-10-17 DIAGNOSIS — J44.9 CHRONIC OBSTRUCTIVE PULMONARY DISEASE, UNSPECIFIED COPD TYPE: ICD-10-CM

## 2022-10-17 DIAGNOSIS — D12.6 ADENOMATOUS POLYP OF COLON, UNSPECIFIED PART OF COLON: ICD-10-CM

## 2022-10-17 DIAGNOSIS — Z12.5 PROSTATE CANCER SCREENING: ICD-10-CM

## 2022-10-17 DIAGNOSIS — Z00.00 WELL ADULT HEALTH CHECK: Primary | ICD-10-CM

## 2022-10-17 DIAGNOSIS — R73.01 IFG (IMPAIRED FASTING GLUCOSE): ICD-10-CM

## 2022-10-17 DIAGNOSIS — I10 ESSENTIAL HYPERTENSION: ICD-10-CM

## 2022-10-17 PROCEDURE — 99396 PREV VISIT EST AGE 40-64: CPT | Performed by: INTERNAL MEDICINE

## 2022-10-17 RX ORDER — ATORVASTATIN CALCIUM 40 MG/1
40 TABLET, FILM COATED ORAL DAILY
Qty: 90 TABLET | Refills: 1 | Status: SHIPPED | OUTPATIENT
Start: 2022-10-17

## 2022-10-17 RX ORDER — AMLODIPINE BESYLATE 5 MG/1
5 TABLET ORAL DAILY
Qty: 90 TABLET | Refills: 1 | Status: SHIPPED | OUTPATIENT
Start: 2022-10-17 | End: 2023-01-06

## 2022-10-17 RX ORDER — IRBESARTAN 75 MG/1
75 TABLET ORAL DAILY
Qty: 90 TABLET | Refills: 1 | Status: SHIPPED | OUTPATIENT
Start: 2022-10-17 | End: 2023-04-05

## 2022-10-17 NOTE — ASSESSMENT & PLAN NOTE
CT 4/21 with stable 9 mm=f/u 6 mo = we will schedule now as of his 9/21 office visit...this is on hold due to insurance, patient has a large deductible this regards.  Patient no new symptoms at least, certainly no hemoptysis etc as of 1/22.---> Will place order 10/22.

## 2022-10-17 NOTE — ASSESSMENT & PLAN NOTE
Blood pressure is improved as of his 10/22 office visit.  He is compliant with low-dose irbesartan and moderate dose amlodipine, continue same going forward.

## 2022-10-17 NOTE — ASSESSMENT & PLAN NOTE
LDL was 115 in 1/22, that was not at goal, but labs are needed, so we will continue current dose of 40 mg for now.

## 2022-10-17 NOTE — ASSESSMENT & PLAN NOTE
Patient has not the Trelegy dropped off, has been fairly stable using albuterol as needed.  Refill given 10/22.

## 2022-10-22 ENCOUNTER — LAB (OUTPATIENT)
Dept: LAB | Facility: HOSPITAL | Age: 62
End: 2022-10-22

## 2022-10-22 DIAGNOSIS — R73.01 IFG (IMPAIRED FASTING GLUCOSE): ICD-10-CM

## 2022-10-22 DIAGNOSIS — Z00.00 WELL ADULT HEALTH CHECK: ICD-10-CM

## 2022-10-22 DIAGNOSIS — Z12.5 PROSTATE CANCER SCREENING: ICD-10-CM

## 2022-10-22 LAB
ALBUMIN SERPL-MCNC: 4.2 G/DL (ref 3.5–5.2)
ALBUMIN/GLOB SERPL: 1.8 G/DL
ALP SERPL-CCNC: 103 U/L (ref 39–117)
ALT SERPL W P-5'-P-CCNC: 21 U/L (ref 1–41)
ANION GAP SERPL CALCULATED.3IONS-SCNC: 10.1 MMOL/L (ref 5–15)
AST SERPL-CCNC: 15 U/L (ref 1–40)
BASOPHILS # BLD AUTO: 0.11 10*3/MM3 (ref 0–0.2)
BASOPHILS NFR BLD AUTO: 1.3 % (ref 0–1.5)
BILIRUB SERPL-MCNC: 0.6 MG/DL (ref 0–1.2)
BUN SERPL-MCNC: 11 MG/DL (ref 8–23)
BUN/CREAT SERPL: 9.9 (ref 7–25)
CALCIUM SPEC-SCNC: 9.6 MG/DL (ref 8.6–10.5)
CHLORIDE SERPL-SCNC: 104 MMOL/L (ref 98–107)
CHOLEST SERPL-MCNC: 156 MG/DL (ref 0–200)
CO2 SERPL-SCNC: 22.9 MMOL/L (ref 22–29)
CREAT SERPL-MCNC: 1.11 MG/DL (ref 0.76–1.27)
DEPRECATED RDW RBC AUTO: 40.9 FL (ref 37–54)
EGFRCR SERPLBLD CKD-EPI 2021: 76 ML/MIN/1.73
EOSINOPHIL # BLD AUTO: 0.23 10*3/MM3 (ref 0–0.4)
EOSINOPHIL NFR BLD AUTO: 2.6 % (ref 0.3–6.2)
ERYTHROCYTE [DISTWIDTH] IN BLOOD BY AUTOMATED COUNT: 12.9 % (ref 12.3–15.4)
GLOBULIN UR ELPH-MCNC: 2.3 GM/DL
GLUCOSE SERPL-MCNC: 98 MG/DL (ref 65–99)
HBA1C MFR BLD: 5.9 % (ref 4.8–5.6)
HCT VFR BLD AUTO: 46.4 % (ref 37.5–51)
HDLC SERPL-MCNC: 35 MG/DL (ref 40–60)
HGB BLD-MCNC: 16.6 G/DL (ref 13–17.7)
IMM GRANULOCYTES # BLD AUTO: 0.01 10*3/MM3 (ref 0–0.05)
IMM GRANULOCYTES NFR BLD AUTO: 0.1 % (ref 0–0.5)
LDLC SERPL CALC-MCNC: 99 MG/DL (ref 0–100)
LDLC/HDLC SERPL: 2.77 {RATIO}
LYMPHOCYTES # BLD AUTO: 2.81 10*3/MM3 (ref 0.7–3.1)
LYMPHOCYTES NFR BLD AUTO: 32 % (ref 19.6–45.3)
MCH RBC QN AUTO: 30.8 PG (ref 26.6–33)
MCHC RBC AUTO-ENTMCNC: 35.8 G/DL (ref 31.5–35.7)
MCV RBC AUTO: 86.1 FL (ref 79–97)
MONOCYTES # BLD AUTO: 0.54 10*3/MM3 (ref 0.1–0.9)
MONOCYTES NFR BLD AUTO: 6.2 % (ref 5–12)
NEUTROPHILS NFR BLD AUTO: 5.07 10*3/MM3 (ref 1.7–7)
NEUTROPHILS NFR BLD AUTO: 57.8 % (ref 42.7–76)
NRBC BLD AUTO-RTO: 0 /100 WBC (ref 0–0.2)
PLATELET # BLD AUTO: 259 10*3/MM3 (ref 140–450)
PMV BLD AUTO: 10.1 FL (ref 6–12)
POTASSIUM SERPL-SCNC: 4.2 MMOL/L (ref 3.5–5.2)
PROT SERPL-MCNC: 6.5 G/DL (ref 6–8.5)
PSA SERPL-MCNC: 4.15 NG/ML (ref 0–4)
RBC # BLD AUTO: 5.39 10*6/MM3 (ref 4.14–5.8)
SODIUM SERPL-SCNC: 137 MMOL/L (ref 136–145)
T4 FREE SERPL-MCNC: 1.19 NG/DL (ref 0.93–1.7)
TRIGL SERPL-MCNC: 120 MG/DL (ref 0–150)
TSH SERPL DL<=0.05 MIU/L-ACNC: 0.59 UIU/ML (ref 0.27–4.2)
VLDLC SERPL-MCNC: 22 MG/DL (ref 5–40)
WBC NRBC COR # BLD: 8.77 10*3/MM3 (ref 3.4–10.8)

## 2022-10-22 PROCEDURE — 80061 LIPID PANEL: CPT

## 2022-10-22 PROCEDURE — 80074 ACUTE HEPATITIS PANEL: CPT

## 2022-10-22 PROCEDURE — 36415 COLL VENOUS BLD VENIPUNCTURE: CPT

## 2022-10-22 PROCEDURE — 85025 COMPLETE CBC W/AUTO DIFF WBC: CPT

## 2022-10-22 PROCEDURE — 80053 COMPREHEN METABOLIC PANEL: CPT

## 2022-10-22 PROCEDURE — 84439 ASSAY OF FREE THYROXINE: CPT

## 2022-10-22 PROCEDURE — 83036 HEMOGLOBIN GLYCOSYLATED A1C: CPT

## 2022-10-22 PROCEDURE — G0103 PSA SCREENING: HCPCS

## 2022-10-22 PROCEDURE — 84443 ASSAY THYROID STIM HORMONE: CPT

## 2022-10-23 LAB
HAV IGM SERPL QL IA: NORMAL
HBV CORE IGM SERPL QL IA: NORMAL
HBV SURFACE AG SERPL QL IA: NORMAL
HCV AB SER DONR QL: NORMAL

## 2022-10-24 ENCOUNTER — TELEPHONE (OUTPATIENT)
Dept: INTERNAL MEDICINE | Facility: CLINIC | Age: 62
End: 2022-10-24

## 2022-10-24 DIAGNOSIS — R97.20 ELEVATED PSA: Primary | ICD-10-CM

## 2022-10-24 NOTE — TELEPHONE ENCOUNTER
----- Message from Siva Gallegos MD sent at 10/24/2022 12:30 AM EDT -----  Let pt know he has neither Hep B nor Hep C.  Does have mildly elevated PSA, so pend appt with  of choice.

## 2022-11-04 ENCOUNTER — TELEPHONE (OUTPATIENT)
Dept: INTERNAL MEDICINE | Facility: CLINIC | Age: 62
End: 2022-11-04

## 2022-11-04 DIAGNOSIS — Z12.11 ENCOUNTER FOR SCREENING COLONOSCOPY: Primary | ICD-10-CM

## 2023-01-03 ENCOUNTER — OFFICE VISIT (OUTPATIENT)
Dept: INTERNAL MEDICINE | Facility: CLINIC | Age: 63
End: 2023-01-03
Payer: COMMERCIAL

## 2023-01-03 ENCOUNTER — HOSPITAL ENCOUNTER (OUTPATIENT)
Dept: GENERAL RADIOLOGY | Facility: HOSPITAL | Age: 63
Discharge: HOME | End: 2023-01-03
Admitting: INTERNAL MEDICINE
Payer: COMMERCIAL

## 2023-01-03 VITALS
OXYGEN SATURATION: 95 % | WEIGHT: 286.2 LBS | DIASTOLIC BLOOD PRESSURE: 70 MMHG | TEMPERATURE: 97.3 F | BODY MASS INDEX: 38.76 KG/M2 | HEIGHT: 72 IN | HEART RATE: 121 BPM | SYSTOLIC BLOOD PRESSURE: 125 MMHG

## 2023-01-03 DIAGNOSIS — J44.1 CHRONIC OBSTRUCTIVE PULMONARY DISEASE WITH ACUTE EXACERBATION: ICD-10-CM

## 2023-01-03 DIAGNOSIS — J11.1 INFLUENZA: ICD-10-CM

## 2023-01-03 DIAGNOSIS — I10 ESSENTIAL HYPERTENSION: ICD-10-CM

## 2023-01-03 DIAGNOSIS — R00.0 TACHYCARDIA: Primary | ICD-10-CM

## 2023-01-03 PROCEDURE — 71046 X-RAY EXAM CHEST 2 VIEWS: CPT

## 2023-01-03 PROCEDURE — 1160F RVW MEDS BY RX/DR IN RCRD: CPT | Performed by: INTERNAL MEDICINE

## 2023-01-03 PROCEDURE — 99214 OFFICE O/P EST MOD 30 MIN: CPT | Performed by: INTERNAL MEDICINE

## 2023-01-03 PROCEDURE — 1159F MED LIST DOCD IN RCRD: CPT | Performed by: INTERNAL MEDICINE

## 2023-01-03 RX ORDER — BUDESONIDE 0.5 MG/2ML
0.5 INHALANT ORAL
Qty: 60 ML | Refills: 2 | Status: SHIPPED | OUTPATIENT
Start: 2023-01-03 | End: 2023-02-02

## 2023-01-03 RX ORDER — ARFORMOTEROL TARTRATE 15 UG/2ML
15 SOLUTION RESPIRATORY (INHALATION)
Qty: 120 ML | Refills: 2 | Status: SHIPPED | OUTPATIENT
Start: 2023-01-03 | End: 2023-01-06

## 2023-01-03 RX ORDER — METOPROLOL SUCCINATE 25 MG/1
25 TABLET, EXTENDED RELEASE ORAL DAILY
Qty: 90 TABLET | Refills: 1 | Status: SHIPPED | OUTPATIENT
Start: 2023-01-03

## 2023-01-03 NOTE — PROGRESS NOTES
Chief Complaint  Influenza (Pt states that he can't breath, he was diagnosed with flu on 12/27/2022, he states that he is worse. Pt went to urgent care on Sunday and he is on medication for this. )    Subjective          Sukhdeep Valladares presents to [facility]     Influenza  Associated symptoms include congestion, coughing, fatigue and weakness. Pertinent negatives include no abdominal pain, arthralgias, chest pain or fever.     History of present illness:  Patient is a 62-year-old male with underlying hypertension, hyperlipidemia, IFG, who is coming in 10/22 for Annual Exam/routine 4-month follow-up.  We will review his labs, any new concerns he may have, and make further recommendations at that time.---> Patient being seen 1/3/23 for urgent visit as described in the chief complaint above.    Review of Systems   Constitutional: Positive for fatigue. Negative for appetite change and fever.   HENT: Positive for congestion. Negative for ear pain.    Eyes: Negative for blurred vision.   Respiratory: Positive for cough and shortness of breath. Negative for chest tightness and wheezing.    Cardiovascular: Negative for chest pain, palpitations and leg swelling.   Gastrointestinal: Negative for abdominal pain.   Genitourinary: Negative for difficulty urinating, dysuria and hematuria.   Musculoskeletal: Negative for arthralgias and gait problem.   Skin: Negative for skin lesions.   Neurological: Positive for weakness. Negative for syncope, memory problem and confusion.   Psychiatric/Behavioral: Negative for self-injury and depressed mood.       Objective   Vital Signs:   /70   Pulse (!) 121   Temp 97.3 °F (36.3 °C) (Skin)   Ht 182.9 cm (72.01\")   Wt 130 kg (286 lb 3.2 oz)   SpO2 95%   BMI 38.81 kg/m²           Physical Exam  Vitals and nursing note reviewed.   Constitutional:       General: He is not in acute distress.     Appearance: Normal appearance. He is not  toxic-appearing.   HENT:      Head: Atraumatic.      Right Ear: External ear normal.      Left Ear: External ear normal.      Nose: Nose normal.      Mouth/Throat:      Mouth: Mucous membranes are moist.   Eyes:      General:         Right eye: No discharge.         Left eye: No discharge.      Extraocular Movements: Extraocular movements intact.      Pupils: Pupils are equal, round, and reactive to light.   Cardiovascular:      Rate and Rhythm: Regular rhythm. Tachycardia present.      Pulses: Normal pulses.      Heart sounds: Normal heart sounds. No murmur heard.    No gallop.      Comments: Tachycardic but regular.  No significant ectopy.  Pulmonary:      Effort: Pulmonary effort is normal. No respiratory distress.      Breath sounds: Rhonchi present. No wheezing or rales.      Comments: Patient with scattered rhonchi and some expiratory wheezes 1/23.  Abdominal:      General: There is no distension.      Palpations: Abdomen is soft. There is no mass.      Tenderness: There is no abdominal tenderness. There is no guarding.   Musculoskeletal:         General: No swelling or tenderness.      Cervical back: No tenderness.      Right lower leg: No edema.      Left lower leg: No edema.      Comments: No edema.   Skin:     General: Skin is warm and dry.      Findings: No rash.   Neurological:      General: No focal deficit present.      Mental Status: He is alert and oriented to person, place, and time. Mental status is at baseline.      Motor: No weakness.      Gait: Gait normal.   Psychiatric:         Mood and Affect: Mood normal.         Thought Content: Thought content normal.          Result Review :   The following data was reviewed by: Siva Gallegos MD on 09/28/2021:                  Assessment and Plan    Diagnoses and all orders for this visit:    1. Tachycardia (Primary)  Assessment & Plan:    Patient being seen acutely ill as a is 1/23 appointment.  He is afebrile, but complains of dyspnea and was noted to  be tachycardic.  His EKG reveals sinus tachycardia without any ischemic changes.  There were no comparison EKGs.  Upon review of previous office visit, looks like patient's heart rate has been around 100 on or than 1 occasion.  He does have underlying hypertension, so we will add low-dose metoprolol at this time.  We will wean some of his other agents if we need to.  Discussed with patient to limit his caffeine etc.      2. Essential hypertension  Assessment & Plan:  Hypertension remained stable as of his 1/23 urgent visit.  He will continue with low-dose irbesartan and moderate dose amlodipine.  As noted above, we are adding low-dose metoprolol due to his chronic tachycardia and increased cardiovascular risk.  Discussed with patient to monitor his blood pressure at least weekly and to notify me if it starts trending down.      3. Influenza  Assessment & Plan:  As noted per chief complaint, patient was diagnosed with this last week and this is the main indication for his urgent visit 1/23.  Patient did receive Tamiflu and completed the therapy.  Additionally he was treated with a Z-Mt that he has already completed as well as empiric amoxicillin which he is still currently taking.  As well as 5 days of prednisone.  Continue meds as written.    Orders:  -     XR Chest PA & Lateral; Future    4. Chronic obstructive pulmonary disease with acute exacerbation (HCC)  Assessment & Plan:  Patient has DuoNebs available, recommend he use it probably at least 4 times a day if not more.  We will send over prescriptions for Brovana and Pulmicort as well.  As noted he is on empiric amoxicillin, will get chest x-ray and consider additional therapy if evidence of infiltrate.    Orders:  -     XR Chest PA & Lateral; Future    Other orders  -     metoprolol succinate XL (Toprol XL) 25 MG 24 hr tablet; Take 1 tablet by mouth Daily.  Dispense: 90 tablet; Refill: 1  -     arformoterol (Brovana) 15 MCG/2ML nebulizer solution; Take 2 mL  by nebulization 2 (Two) Times a Day for 30 days.  Dispense: 120 mL; Refill: 2  -     budesonide (Pulmicort) 0.5 MG/2ML nebulizer solution; Take 2 mL by nebulization Daily for 30 days.  Dispense: 60 mL; Refill: 2     --  --  Older notes:  VISIT 6/21:  NEW PT EXAM 6/20 and needs all labs; no ischemic CP; chronic CHAVEZ.  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  --  LIPIDS with  and I d/w him diet needed 6/20---> LDL was 141 in 3/21, so I started statin then. (TSH neg 6/20).  IFG = A1C 5.9---> 5.5 in 3/21.  --  LEUKOCYTOSIS is very mild, nl diff, ne g h/o, so just repeat labs later date---> wnl 3/21.  POLYCYTHEMIA = f/u on RTO as well=will get in '21---> neg 3/21.  --  COPD = [facility]; [facility] CT for below---> per [facility] now=we will jeimy him as of 3/21 OV since he missed his appt.  TOBACCO USE D/O = smoker 40 yrs; as above...has 9 mm RUL MASS with neg PET per his report; has f/u with PULM for repeat CT in 3 mo...I will get CT now 3/21 since he did not f/u with them as [facility].---> CT 4/21 with stable 9 mm=f/u 6 mo.  --  MORBID ACSEFFR=873 with BMI 39.  --  BPH sxs as of 3/21 OV and I d/w he needs labs today and then call us for the results.  --  --  PSA 3.0 (3/29/21).  COLON 4/16 = 13 polyps=needs to be seen soon as of 6/20 OV, but will address BP first.---> Referral placed 1/22.  COVID rec 6/21 and will only take Thuy.  ( 12/19 after together since '04;  10 yrs and was in [employer] prior; girl is 26; other girl passed heroin OD In '16).    Follow Up   Return in about 3 days (around 1/6/2023) for Next scheduled follow up.  Patient was given instructions and counseling regarding  his condition or for health maintenance advice. Please see specific information pulled into the AVS if appropriate.

## 2023-01-03 NOTE — ASSESSMENT & PLAN NOTE
Patient has DuoNebs available, recommend he use it probably at least 4 times a day if not more.  We will send over prescriptions for Brovana and Pulmicort as well.  As noted he is on empiric amoxicillin, will get chest x-ray and consider additional therapy if evidence of infiltrate.

## 2023-01-03 NOTE — ASSESSMENT & PLAN NOTE
Patient being seen acutely ill as a is 1/23 appointment.  He is afebrile, but complains of dyspnea and was noted to be tachycardic.  His EKG reveals sinus tachycardia without any ischemic changes.  There were no comparison EKGs.  Upon review of previous office visit, looks like patient's heart rate has been around 100 on or than 1 occasion.  He does have underlying hypertension, so we will add low-dose metoprolol at this time.  We will wean some of his other agents if we need to.  Discussed with patient to limit his caffeine etc.

## 2023-01-03 NOTE — PATIENT INSTRUCTIONS
Go over and get the chest x-ray, you can do this at the facility in [facility].    2.  Call for the results tomorrow.    3.  I sent 2 additional nebulizer treatments to your pharmacy, these are to be taken twice a day.    4.  Additionally as we discussed, you can take the albuterol/ipratropium nebulizer treatments as often as 6 times a day.    5.  Get Mucinex 600 mg over-the-counter and take it twice a day.    6.  I also sent over a small 25 mg pill of metoprolol for you to take once a day to help with your heart rate.

## 2023-01-03 NOTE — ASSESSMENT & PLAN NOTE
Hypertension remained stable as of his 1/23 urgent visit.  He will continue with low-dose irbesartan and moderate dose amlodipine.  As noted above, we are adding low-dose metoprolol due to his chronic tachycardia and increased cardiovascular risk.  Discussed with patient to monitor his blood pressure at least weekly and to notify me if it starts trending down.

## 2023-01-03 NOTE — ASSESSMENT & PLAN NOTE
As noted per chief complaint, patient was diagnosed with this last week and this is the main indication for his urgent visit 1/23.  Patient did receive Tamiflu and completed the therapy.  Additionally he was treated with a Z-Mt that he has already completed as well as empiric amoxicillin which he is still currently taking.  As well as 5 days of prednisone.  Continue meds as written.

## 2023-01-06 ENCOUNTER — OFFICE VISIT (OUTPATIENT)
Dept: INTERNAL MEDICINE | Facility: CLINIC | Age: 63
End: 2023-01-06
Payer: COMMERCIAL

## 2023-01-06 VITALS
SYSTOLIC BLOOD PRESSURE: 90 MMHG | BODY MASS INDEX: 38.68 KG/M2 | DIASTOLIC BLOOD PRESSURE: 70 MMHG | TEMPERATURE: 97 F | OXYGEN SATURATION: 95 % | WEIGHT: 285.6 LBS | HEIGHT: 72 IN | HEART RATE: 94 BPM

## 2023-01-06 DIAGNOSIS — R00.0 TACHYCARDIA: ICD-10-CM

## 2023-01-06 DIAGNOSIS — I10 ESSENTIAL HYPERTENSION: ICD-10-CM

## 2023-01-06 DIAGNOSIS — R53.83 OTHER FATIGUE: ICD-10-CM

## 2023-01-06 DIAGNOSIS — J11.1 INFLUENZA: ICD-10-CM

## 2023-01-06 DIAGNOSIS — J43.9 PULMONARY EMPHYSEMA, UNSPECIFIED EMPHYSEMA TYPE: Primary | ICD-10-CM

## 2023-01-06 LAB
ALBUMIN SERPL-MCNC: 3.7 G/DL (ref 3.5–5.2)
ALBUMIN/GLOB SERPL: 1.4 G/DL
ALP SERPL-CCNC: 80 U/L (ref 39–117)
ALT SERPL W P-5'-P-CCNC: 53 U/L (ref 1–41)
ANION GAP SERPL CALCULATED.3IONS-SCNC: 9.9 MMOL/L (ref 5–15)
AST SERPL-CCNC: 24 U/L (ref 1–40)
BASOPHILS # BLD AUTO: 0.06 10*3/MM3 (ref 0–0.2)
BASOPHILS NFR BLD AUTO: 0.5 % (ref 0–1.5)
BILIRUB SERPL-MCNC: 0.5 MG/DL (ref 0–1.2)
BUN SERPL-MCNC: 18 MG/DL (ref 8–23)
BUN/CREAT SERPL: 15.5 (ref 7–25)
CALCIUM SPEC-SCNC: 9 MG/DL (ref 8.6–10.5)
CHLORIDE SERPL-SCNC: 106 MMOL/L (ref 98–107)
CO2 SERPL-SCNC: 25.1 MMOL/L (ref 22–29)
CREAT SERPL-MCNC: 1.16 MG/DL (ref 0.76–1.27)
DEPRECATED RDW RBC AUTO: 42.3 FL (ref 37–54)
EGFRCR SERPLBLD CKD-EPI 2021: 72.1 ML/MIN/1.73
EOSINOPHIL # BLD AUTO: 0.27 10*3/MM3 (ref 0–0.4)
EOSINOPHIL NFR BLD AUTO: 2.4 % (ref 0.3–6.2)
ERYTHROCYTE [DISTWIDTH] IN BLOOD BY AUTOMATED COUNT: 13 % (ref 12.3–15.4)
GLOBULIN UR ELPH-MCNC: 2.7 GM/DL
GLUCOSE SERPL-MCNC: 97 MG/DL (ref 65–99)
HCT VFR BLD AUTO: 46.9 % (ref 37.5–51)
HGB BLD-MCNC: 15.9 G/DL (ref 13–17.7)
IMM GRANULOCYTES # BLD AUTO: 0.05 10*3/MM3 (ref 0–0.05)
IMM GRANULOCYTES NFR BLD AUTO: 0.5 % (ref 0–0.5)
LYMPHOCYTES # BLD AUTO: 3.54 10*3/MM3 (ref 0.7–3.1)
LYMPHOCYTES NFR BLD AUTO: 32.1 % (ref 19.6–45.3)
MCH RBC QN AUTO: 30.3 PG (ref 26.6–33)
MCHC RBC AUTO-ENTMCNC: 33.9 G/DL (ref 31.5–35.7)
MCV RBC AUTO: 89.3 FL (ref 79–97)
MONOCYTES # BLD AUTO: 1.1 10*3/MM3 (ref 0.1–0.9)
MONOCYTES NFR BLD AUTO: 10 % (ref 5–12)
NEUTROPHILS NFR BLD AUTO: 54.5 % (ref 42.7–76)
NEUTROPHILS NFR BLD AUTO: 6.02 10*3/MM3 (ref 1.7–7)
NRBC BLD AUTO-RTO: 0 /100 WBC (ref 0–0.2)
PLATELET # BLD AUTO: 351 10*3/MM3 (ref 140–450)
PMV BLD AUTO: 9.8 FL (ref 6–12)
POTASSIUM SERPL-SCNC: 4.7 MMOL/L (ref 3.5–5.2)
PROCALCITONIN SERPL-MCNC: 0.07 NG/ML (ref 0–0.25)
PROT SERPL-MCNC: 6.4 G/DL (ref 6–8.5)
RBC # BLD AUTO: 5.25 10*6/MM3 (ref 4.14–5.8)
SODIUM SERPL-SCNC: 141 MMOL/L (ref 136–145)
T4 FREE SERPL-MCNC: 1.28 NG/DL (ref 0.93–1.7)
TSH SERPL DL<=0.05 MIU/L-ACNC: 1.02 UIU/ML (ref 0.27–4.2)
WBC NRBC COR # BLD: 11.04 10*3/MM3 (ref 3.4–10.8)

## 2023-01-06 PROCEDURE — 99214 OFFICE O/P EST MOD 30 MIN: CPT | Performed by: INTERNAL MEDICINE

## 2023-01-06 PROCEDURE — 84145 PROCALCITONIN (PCT): CPT | Performed by: INTERNAL MEDICINE

## 2023-01-06 PROCEDURE — 84439 ASSAY OF FREE THYROXINE: CPT | Performed by: INTERNAL MEDICINE

## 2023-01-06 PROCEDURE — 80053 COMPREHEN METABOLIC PANEL: CPT | Performed by: INTERNAL MEDICINE

## 2023-01-06 PROCEDURE — 36415 COLL VENOUS BLD VENIPUNCTURE: CPT | Performed by: INTERNAL MEDICINE

## 2023-01-06 PROCEDURE — 84443 ASSAY THYROID STIM HORMONE: CPT | Performed by: INTERNAL MEDICINE

## 2023-01-06 PROCEDURE — 85025 COMPLETE CBC W/AUTO DIFF WBC: CPT | Performed by: INTERNAL MEDICINE

## 2023-01-06 RX ORDER — PREDNISONE 20 MG/1
TABLET ORAL
Qty: 11 TABLET | Refills: 0 | Status: SHIPPED | OUTPATIENT
Start: 2023-01-06

## 2023-01-06 NOTE — ASSESSMENT & PLAN NOTE
Patient's chest x-ray with underlying emphysema, but no acute infiltrate.  He is still having significant bronchospasm and resultant fatigue.  His O2 sats are stable as noted.  Insurance did not cover Brovana but he is utilizing twice daily Pulmicort and duo nebs frequently.  We will give him another round of steroids and check routine labs given his fatigue as of his 1/23 follow-up.

## 2023-01-06 NOTE — ASSESSMENT & PLAN NOTE
As noted above underneath the tachycardia heading, he is still having intermittent hypotensive episodes, he was low at a clinic recently as well.  I think he needs the low-dose metoprolol that was added recently, but we will go ahead and discontinue amlodipine as of his 1/23 follow-up.  He will continue low-dose irbesartan for the time being at least.

## 2023-01-06 NOTE — ASSESSMENT & PLAN NOTE
Patient has had intermittent hypotensive episodes during this acute illness as of his 1/23 follow-up.  He has low-dose irbesartan and moderate dose amlodipine on board.  We added very low-dose metoprolol last office visit due to persistent tachycardia.  I think patient should discontinue amlodipine for now, will follow-up blood pressure on return to office.

## 2023-01-06 NOTE — ASSESSMENT & PLAN NOTE
Patient has completed therapy for this as of his 1/23 office visit.  No fevers and his sats are holding at rest at least.

## 2023-01-16 ENCOUNTER — TELEPHONE (OUTPATIENT)
Dept: UROLOGY | Facility: CLINIC | Age: 63
End: 2023-01-16

## 2023-01-16 NOTE — TELEPHONE ENCOUNTER
SPOKE TO REGINA AT DR. ZHOU'S OFFICE TO INFORM PT CX NEW PT APPT WITH Edda SUAREZ FROM 1/16/MS  
Principal Discharge DX:	Strep throat

## 2023-01-27 ENCOUNTER — TELEPHONE (OUTPATIENT)
Dept: SURGERY | Facility: CLINIC | Age: 63
End: 2023-01-27

## 2023-04-05 RX ORDER — IRBESARTAN 75 MG/1
75 TABLET ORAL DAILY
Qty: 90 TABLET | Refills: 1 | Status: SHIPPED | OUTPATIENT
Start: 2023-04-05

## 2023-04-10 RX ORDER — BUDESONIDE 0.5 MG/2ML
INHALANT ORAL
Qty: 60 ML | Refills: 2 | Status: SHIPPED | OUTPATIENT
Start: 2023-04-10

## 2023-05-19 RX ORDER — ATORVASTATIN CALCIUM 40 MG/1
40 TABLET, FILM COATED ORAL DAILY
Qty: 90 TABLET | Refills: 1 | Status: SHIPPED | OUTPATIENT
Start: 2023-05-19

## 2023-05-19 NOTE — TELEPHONE ENCOUNTER
Rx Refill Note  Requested Prescriptions      No prescriptions requested or ordered in this encounter      Last office visit with prescribing clinician: 1/6/2023   Last telemedicine visit with prescribing clinician: 4/10/2023   Next office visit with prescribing clinician: Visit date not found                         Would you like a call back once the refill request has been completed: [] Yes [] No    If the office needs to give you a call back, can they leave a voicemail: [] Yes [] No    Beverley Esquivel MA  05/19/23, 14:01 EDT

## 2024-04-07 ENCOUNTER — HOSPITAL ENCOUNTER (EMERGENCY)
Facility: HOSPITAL | Age: 64
Discharge: HOME | End: 2024-04-08
Attending: EMERGENCY MEDICINE | Admitting: EMERGENCY MEDICINE
Payer: OTHER GOVERNMENT

## 2024-04-07 VITALS
HEIGHT: 72 IN | BODY MASS INDEX: 42.31 KG/M2 | DIASTOLIC BLOOD PRESSURE: 79 MMHG | HEART RATE: 80 BPM | SYSTOLIC BLOOD PRESSURE: 150 MMHG | WEIGHT: 312.39 LBS | TEMPERATURE: 98 F | RESPIRATION RATE: 20 BRPM | OXYGEN SATURATION: 96 %

## 2024-04-07 DIAGNOSIS — R33.9 URINARY RETENTION: Primary | ICD-10-CM

## 2024-04-07 LAB
ALBUMIN SERPL-MCNC: 4 G/DL (ref 3.5–5.2)
ALBUMIN/GLOB SERPL: 1.5 G/DL
ALP SERPL-CCNC: 94 U/L (ref 39–117)
ALT SERPL W P-5'-P-CCNC: 18 U/L (ref 1–41)
ANION GAP SERPL CALCULATED.3IONS-SCNC: 14 MMOL/L (ref 5–15)
AST SERPL-CCNC: 21 U/L (ref 1–40)
BACTERIA UR QL AUTO: ABNORMAL /HPF
BASOPHILS # BLD AUTO: 0.12 10*3/MM3 (ref 0–0.2)
BASOPHILS NFR BLD AUTO: 1.1 % (ref 0–1.5)
BILIRUB SERPL-MCNC: 0.3 MG/DL (ref 0–1.2)
BILIRUB UR QL STRIP: NEGATIVE
BUN SERPL-MCNC: 13 MG/DL (ref 8–23)
BUN/CREAT SERPL: 11.2 (ref 7–25)
CALCIUM SPEC-SCNC: 8.9 MG/DL (ref 8.6–10.5)
CHLORIDE SERPL-SCNC: 102 MMOL/L (ref 98–107)
CLARITY UR: CLEAR
CO2 SERPL-SCNC: 20 MMOL/L (ref 22–29)
COLOR UR: YELLOW
CREAT SERPL-MCNC: 1.16 MG/DL (ref 0.76–1.27)
DEPRECATED RDW RBC AUTO: 45.1 FL (ref 37–54)
EGFRCR SERPLBLD CKD-EPI 2021: 71.7 ML/MIN/1.73
EOSINOPHIL # BLD AUTO: 0.42 10*3/MM3 (ref 0–0.4)
EOSINOPHIL NFR BLD AUTO: 3.8 % (ref 0.3–6.2)
ERYTHROCYTE [DISTWIDTH] IN BLOOD BY AUTOMATED COUNT: 14 % (ref 12.3–15.4)
GLOBULIN UR ELPH-MCNC: 2.6 GM/DL
GLUCOSE SERPL-MCNC: 100 MG/DL (ref 65–99)
GLUCOSE UR STRIP-MCNC: NEGATIVE MG/DL
HCT VFR BLD AUTO: 45 % (ref 37.5–51)
HGB BLD-MCNC: 15.4 G/DL (ref 13–17.7)
HGB UR QL STRIP.AUTO: ABNORMAL
HYALINE CASTS UR QL AUTO: ABNORMAL /LPF
IMM GRANULOCYTES # BLD AUTO: 0.02 10*3/MM3 (ref 0–0.05)
IMM GRANULOCYTES NFR BLD AUTO: 0.2 % (ref 0–0.5)
KETONES UR QL STRIP: NEGATIVE
LEUKOCYTE ESTERASE UR QL STRIP.AUTO: NEGATIVE
LYMPHOCYTES # BLD AUTO: 3.8 10*3/MM3 (ref 0.7–3.1)
LYMPHOCYTES NFR BLD AUTO: 34.8 % (ref 19.6–45.3)
MCH RBC QN AUTO: 30.5 PG (ref 26.6–33)
MCHC RBC AUTO-ENTMCNC: 34.2 G/DL (ref 31.5–35.7)
MCV RBC AUTO: 89.1 FL (ref 79–97)
MONOCYTES # BLD AUTO: 0.6 10*3/MM3 (ref 0.1–0.9)
MONOCYTES NFR BLD AUTO: 5.5 % (ref 5–12)
NEUTROPHILS NFR BLD AUTO: 5.97 10*3/MM3 (ref 1.7–7)
NEUTROPHILS NFR BLD AUTO: 54.6 % (ref 42.7–76)
NITRITE UR QL STRIP: NEGATIVE
NRBC BLD AUTO-RTO: 0 /100 WBC (ref 0–0.2)
PH UR STRIP.AUTO: 6 [PH] (ref 5–8)
PLATELET # BLD AUTO: 263 10*3/MM3 (ref 140–450)
PMV BLD AUTO: 9.6 FL (ref 6–12)
POTASSIUM SERPL-SCNC: 4.4 MMOL/L (ref 3.5–5.2)
PROT SERPL-MCNC: 6.6 G/DL (ref 6–8.5)
PROT UR QL STRIP: NEGATIVE
RBC # BLD AUTO: 5.05 10*6/MM3 (ref 4.14–5.8)
RBC # UR STRIP: ABNORMAL /HPF
REF LAB TEST METHOD: ABNORMAL
SODIUM SERPL-SCNC: 136 MMOL/L (ref 136–145)
SP GR UR STRIP: <=1.005 (ref 1–1.03)
SQUAMOUS #/AREA URNS HPF: ABNORMAL /HPF
UROBILINOGEN UR QL STRIP: ABNORMAL
WBC # UR STRIP: ABNORMAL /HPF
WBC NRBC COR # BLD AUTO: 10.93 10*3/MM3 (ref 3.4–10.8)

## 2024-04-07 PROCEDURE — P9612 CATHETERIZE FOR URINE SPEC: HCPCS

## 2024-04-07 PROCEDURE — 85025 COMPLETE CBC W/AUTO DIFF WBC: CPT

## 2024-04-07 PROCEDURE — 99283 EMERGENCY DEPT VISIT LOW MDM: CPT

## 2024-04-07 PROCEDURE — 81001 URINALYSIS AUTO W/SCOPE: CPT

## 2024-04-07 PROCEDURE — 80053 COMPREHEN METABOLIC PANEL: CPT

## 2024-04-07 RX ORDER — ERGOCALCIFEROL (VITAMIN D2) 10 MCG
400 TABLET ORAL DAILY
COMMUNITY

## 2024-04-08 NOTE — ED PROVIDER NOTES
Time: 8:37 PM EDT  Date of encounter:  4/7/2024  Independent Historian/Clinical History and Information was obtained by:   Patient    History is limited by: N/A    Chief Complaint: Difficulty urinating      History of Present Illness:  Patient is a 63 y.o. year old male who presents to the emergency department for evaluation of difficulty urinating for the past couple hours.  Patient feels like he has to urinate but he cannot.  Patient complains of severe suprapubic abdominal tenderness.  Patient states he has never had a difficult time trying to urinate before.  Patient is already on a prostate medication.  Patient denies fevers.  Patient denies any flank pain or tenderness.    HPI    Patient Care Team  Primary Care Provider: Whitney Alvarez PA-C    Past Medical History:     No Known Allergies  Past Medical History:   Diagnosis Date    Essential (primary) hypertension     Hypertension     Mixed hyperlipidemia     Pulmonary nodule     Secondary polycythemia      History reviewed. No pertinent surgical history.  History reviewed. No pertinent family history.    Home Medications:  Prior to Admission medications    Medication Sig Start Date End Date Taking? Authorizing Provider   albuterol (PROAIR RESPICLICK) 108 (90 Base) MCG/ACT inhaler Inhale 2 puffs Every 4 (Four) Hours As Needed for Wheezing. 10/17/22   Siva Gallegos MD   atorvastatin (LIPITOR) 40 MG tablet Take 1 tablet by mouth Daily. 5/19/23   Siva Gallegos MD   benzonatate (TESSALON) 200 MG capsule Take 1 capsule by mouth 3 (Three) Times a Day As Needed for Cough. 12/28/22   Katie Almonte APRN   budesonide (PULMICORT) 0.5 MG/2ML nebulizer solution USE 2 ML VIA NEBULIZER DAILY FOR 30 DAYS 4/10/23   Siva Gallegos MD   ipratropium-albuterol (DUO-NEB) 0.5-2.5 mg/3 ml nebulizer Take 3 mL by nebulization Every 4 (Four) Hours As Needed for Wheezing or Shortness of Air. 12/28/22   Katie Almonte APRN   irbesartan (AVAPRO) 75 MG tablet TAKE 1  "TABLET BY MOUTH DAILY 4/5/23   Siva Gallegos MD   metoprolol succinate XL (Toprol XL) 25 MG 24 hr tablet Take 1 tablet by mouth Daily. 1/3/23   Siva Gallegos MD   predniSONE (DELTASONE) 20 MG tablet 1 tablet twice a day for 3 days, 1 tablet once a day for 3 days, 1/2 tablet daily for 4 days. 1/6/23   Siva Gallegos MD   tamsulosin (FLOMAX) 0.4 MG capsule 24 hr capsule Take 1 capsule by mouth Daily.    Provider, MD Ruperto   Vitamin D, Cholecalciferol, (CHOLECALCIFEROL) 10 MCG (400 UNIT) tablet Take 1 tablet by mouth Daily.    Provider, MD Ruperto        Social History:   Social History     Tobacco Use    Smoking status: Every Day     Current packs/day: 2.00     Types: Cigarettes     Passive exposure: Never    Smokeless tobacco: Never   Vaping Use    Vaping status: Never Used   Substance Use Topics    Alcohol use: Yes     Alcohol/week: 3.0 standard drinks of alcohol     Types: 3 Standard drinks or equivalent per week    Drug use: Not Currently     Types: Cocaine(coke)         Review of Systems:  Review of Systems   Constitutional:  Negative for chills and fever.   HENT:  Negative for congestion, ear pain and sore throat.    Eyes:  Negative for pain.   Respiratory:  Negative for cough, chest tightness and shortness of breath.    Cardiovascular:  Negative for chest pain.   Gastrointestinal:  Negative for abdominal pain (Suprapubic pain), diarrhea, nausea and vomiting.   Genitourinary:  Positive for difficulty urinating. Negative for dysuria, flank pain and hematuria.   Musculoskeletal:  Negative for joint swelling.   Skin:  Negative for pallor.   Neurological:  Negative for seizures and headaches.   All other systems reviewed and are negative.       Physical Exam:  /79 (BP Location: Right arm, Patient Position: Lying)   Pulse 80   Temp 98 °F (36.7 °C) (Oral)   Resp 20   Ht 182.9 cm (72\")   Wt (!) 142 kg (312 lb 6.3 oz)   SpO2 96%   BMI 42.37 kg/m²   Vital signs were reviewed under triage " note.  General appearance - Patient appears well-developed and well-nourished.  Patient is in no acute distress.  Head - Normocephalic, atraumatic.  Pupils - Equal, round, reactive to light.  Extraocular muscles are intact.  Conjunctiva is clear.  Nasal - Normal inspection.  No evidence of trauma or epistaxis.  Tympanic membranes - Gray, intact without erythema or retractions.  Oral mucosa - Pink and moist without lesions or erythema.  Uvula is midline.  Chest wall - Atraumatic.  Chest wall is nontender.  There are no vesicular rashes noted.  Neck - Supple.  Trachea was midline.  There is no palpable lymphadenopathy or thyromegaly.  There are no meningeal signs  Lungs - Clear to auscultation and percussion bilaterally.  Heart - Regular rate and rhythm without any murmurs, clicks, or gallops.  Abdomen - Soft.  Bowel sounds are present.  There is no palpable tenderness.  There is no rebound, guarding, or rigidity.  There are no palpable masses.  There are no pulsatile masses.  Back - Spine is straight and midline.  There is no CVA tenderness.  Extremities - Intact x4 with full range of motion.  There is no palpable edema.  Pulses are intact x4 and equal.  Neurologic - Patient is awake, alert, and oriented x3.  Cranial nerves II through XII are grossly intact.  Motor and sensory functions grossly intact.  Cerebellar function was normal.  Integument - There are no rashes.  There are no petechia or purpura lesions noted.  There are no vesicular lesions noted.            Procedures:  Procedures      Medical Decision Making:      Comorbidities that affect care:    Hypertension, polycythemia, hyperlipidemia    External Notes reviewed:    Previous Clinic Note: Office visit with Dr. Gallegos dated 1/6/2023 was reviewed by me.      The following orders were placed and all results were independently analyzed by me:  Orders Placed This Encounter   Procedures    Urinalysis With Microscopic If Indicated (No Culture) - Urine,  Catheter    Comprehensive Metabolic Panel    CBC Auto Differential    Urinalysis, Microscopic Only - Urine, Clean Catch    Straight cath    Remove Urinary Catheter    CBC & Differential       Medications Given in the Emergency Department:  Medications - No data to display     ED Course:     Patient was seen and evaluated in the ED by me.  The above history and physical examination was performed as documented.  Diagnostic data was obtained.  Results reviewed.  Findings were discussed with the patient.  Nursing staff had already obtained an order to cath the patient.  Patient over a liter of urine in his Manriquez catheter.  Patient states his symptoms have resolved.  I did discuss treatment options with the patient including removing the catheter and let him have a trial of voiding.  Patient understands that if he is unable to void he may have to return to the ER to have another catheter placed.  The other option would be to go home with a catheter in place and follow-up with urology as an outpatient.  Patient states he does not want the catheter left in place.  He understands that he may have to return if he cannot urinate.  Patient states he will not drink any additional beer as he feels this may be the factor that caused this.  Additionally, [facility]  He states that if he has any problems he will follow-up tomorrow morning.    Labs:    Lab Results (last 24 hours)       Procedure Component Value Units Date/Time    Urinalysis With Microscopic If Indicated (No Culture) - Urine, Catheter [[id number]]  (Abnormal) Collected: 04/07/24 2100    Specimen: Urine, Catheter Updated: 04/07/24 2117     Color, UA Yellow     Appearance, UA Clear     pH, UA 6.0     Specific Gravity, UA <=1.005     Glucose, UA Negative     Ketones, UA Negative     Bilirubin, UA Negative     Blood, UA Trace     Protein, UA Negative     Leuk Esterase, UA Negative     Nitrite, UA Negative     Urobilinogen,  UA 0.2 E.U./dL    CBC & Differential [[id number]]  (Abnormal) Collected: 04/07/24 2100    Specimen: Blood Updated: 04/07/24 2113    Narrative:      The following orders were created for panel order CBC & Differential.  Procedure                               Abnormality         Status                     ---------                               -----------         ------                     CBC Auto Differential[[id number]]        Abnormal            Final result                 Please view results for these tests on the individual orders.    Comprehensive Metabolic Panel [[id number]]  (Abnormal) Collected: 04/07/24 2100    Specimen: Blood Updated: 04/07/24 2136     Glucose 100 mg/dL      BUN 13 mg/dL      Creatinine 1.16 mg/dL      Sodium 136 mmol/L      Potassium 4.4 mmol/L      Comment: Slight hemolysis detected by analyzer. Result may be falsely elevated.        Chloride 102 mmol/L      CO2 20.0 mmol/L      Calcium 8.9 mg/dL      Total Protein 6.6 g/dL      Albumin 4.0 g/dL      ALT (SGPT) 18 U/L      AST (SGOT) 21 U/L      Comment: Slight hemolysis detected by analyzer. Result may be falsely elevated.        Alkaline Phosphatase 94 U/L      Total Bilirubin 0.3 mg/dL      Globulin 2.6 gm/dL      A/G Ratio 1.5 g/dL      BUN/Creatinine Ratio 11.2     Anion Gap 14.0 mmol/L      eGFR 71.7 mL/min/1.73     Narrative:      GFR Normal >60  Chronic Kidney Disease <60  Kidney Failure <15      CBC Auto Differential [[id number]]  (Abnormal) Collected: 04/07/24 2100    Specimen: Blood Updated: 04/07/24 2113     WBC 10.93 10*3/mm3      RBC 5.05 10*6/mm3      Hemoglobin 15.4 g/dL      Hematocrit 45.0 %      MCV 89.1 fL      MCH 30.5 pg      MCHC 34.2 g/dL      RDW 14.0 %      RDW-SD 45.1 fl      MPV 9.6 fL      Platelets 263 10*3/mm3      Neutrophil % 54.6 %      Lymphocyte % 34.8 %      Monocyte % 5.5 %      Eosinophil % 3.8 %      Basophil % 1.1 %      Immature Grans % 0.2 %      Neutrophils, Absolute 5.97 10*3/mm3       Lymphocytes, Absolute 3.80 10*3/mm3      Monocytes, Absolute 0.60 10*3/mm3      Eosinophils, Absolute 0.42 10*3/mm3      Basophils, Absolute 0.12 10*3/mm3      Immature Grans, Absolute 0.02 10*3/mm3      nRBC 0.0 /100 WBC     Urinalysis, Microscopic Only - Urine, Catheter [[id number]]  (Abnormal) Collected: 04/07/24 2100    Specimen: Urine, Catheter Updated: 04/07/24 2117     RBC, UA 0-2 /HPF      WBC, UA 3-5 /HPF      Bacteria, UA None Seen /HPF      Squamous Epithelial Cells, UA 0-2 /HPF      Hyaline Casts, UA 0-2 /LPF      Methodology Automated Microscopy             Imaging:    No Radiology Exams Resulted Within Past 24 Hours      Differential Diagnosis and Discussion:    Dysuria: Differential diagnosis includes but is not limited to urethritis, cystitis, pyelonephritis, ureteral calculi, neoplasm, chemical irritant, urethral stricture, and trauma    All labs were reviewed and interpreted by me.    MDM     Amount and/or Complexity of Data Reviewed  Clinical lab tests: reviewed                 Patient Care Considerations:    CT ABDOMEN AND PELVIS: I considered ordering a CT scan of the abdomen and pelvis however the patient's symptoms are resolved after Manriquez catheter was placed.       Consultants/Shared Management Plan:    None    Social Determinants of Health:    Patient is independent, reliable, and has access to care.       Disposition and Care Coordination:    Discharged: The patient is suitable and stable for discharge with no need for consideration of admission.    I have explained the patient´s condition, diagnoses and treatment plan based on the information available to me at this time. I have answered questions and addressed any concerns. The patient has a good  understanding of the patient´s diagnosis, condition, and treatment plan as can be expected at this point. The vital signs have been stable. The patient´s condition is stable and appropriate for discharge from the emergency department.      The  patient will pursue further outpatient evaluation with the primary care physician or other designated or consulting physician as outlined in the discharge instructions. They are agreeable to this plan of care and follow-up instructions have been explained in detail. The patient has received these instructions in written format and has expressed an understanding of the discharge instructions. The patient is aware that any significant change in condition or worsening of symptoms should prompt an immediate return to this or the closest emergency department or call to 911.    Final diagnoses:   Urinary retention        ED Disposition       ED Disposition   Discharge    Condition   Stable    Comment   --               This medical record created using voice recognition software.             Onel Clarke DO  04/11/24 1046

## 2024-04-08 NOTE — DISCHARGE INSTRUCTIONS
Avoid any alcohol consumption.  Take your home medications as prescribed.  [facility]  Return to any ER if you are unable to urinate for more than 6 to 8 hours or if you develop severe abdominal pain or any other concerns that would arise.

## 2024-04-10 ENCOUNTER — HOSPITAL ENCOUNTER (EMERGENCY)
Facility: HOSPITAL | Age: 64
Discharge: HOME | End: 2024-04-10
Attending: EMERGENCY MEDICINE | Admitting: EMERGENCY MEDICINE
Payer: OTHER GOVERNMENT

## 2024-04-10 VITALS
WEIGHT: 305.56 LBS | OXYGEN SATURATION: 94 % | DIASTOLIC BLOOD PRESSURE: 55 MMHG | TEMPERATURE: 98.8 F | SYSTOLIC BLOOD PRESSURE: 101 MMHG | HEART RATE: 79 BPM | HEIGHT: 72 IN | RESPIRATION RATE: 22 BRPM | BODY MASS INDEX: 41.39 KG/M2

## 2024-04-10 DIAGNOSIS — N48.89 PENILE PAIN: ICD-10-CM

## 2024-04-10 DIAGNOSIS — R33.9 URINARY RETENTION: Primary | ICD-10-CM

## 2024-04-10 PROCEDURE — 99283 EMERGENCY DEPT VISIT LOW MDM: CPT

## 2024-04-10 NOTE — ED NOTES
Manriquez catheter removed. Deflated balloon using 10 cc syringe. Residue of 50 cc of red liquid in the leg bag. No s/sx of distress noted with removal. PT tolerated procedure well.

## 2024-04-10 NOTE — ED PROVIDER NOTES
Time: 10:46 AM EDT  Date of encounter:  4/10/2024  Independent Historian/Clinical History and Information was obtained by:   Patient    History is limited by: N/A    Chief Complaint: Penile pain, Manriquez pain      History of Present Illness:  Patient is a 63 y.o. year old male who presents to the emergency department for evaluation of pain at his penis due to Manriquez catheter.  He had to have a Manriquez catheter placed a couple days ago due to urinary retention.  [facility] and that [facility] with that but did not change it and he states he still had discomfort at his penis.  States he cannot move without having severe discomfort to his penis.  He has follow-up with urology on Friday.  No other complaints this time.    hospitals    Patient Care Team  Primary Care Provider: Whitney Alvarez PA-C    Past Medical History:     No Known Allergies  Past Medical History:   Diagnosis Date    Essential (primary) hypertension     Hypertension     Mixed hyperlipidemia     Pulmonary nodule     Secondary polycythemia      No past surgical history on file.  No family history on file.    Home Medications:  Prior to Admission medications    Medication Sig Start Date End Date Taking? Authorizing Provider   albuterol (PROAIR RESPICLICK) 108 (90 Base) MCG/ACT inhaler Inhale 2 puffs Every 4 (Four) Hours As Needed for Wheezing. 10/17/22   Siva Gallegos MD   atorvastatin (LIPITOR) 40 MG tablet Take 1 tablet by mouth Daily. 5/19/23   Siva Gallegos MD   benzonatate (TESSALON) 200 MG capsule Take 1 capsule by mouth 3 (Three) Times a Day As Needed for Cough. 12/28/22   Katie Almonte APRN   budesonide (PULMICORT) 0.5 MG/2ML nebulizer solution USE 2 ML VIA NEBULIZER DAILY FOR 30 DAYS 4/10/23   Siva Gallegos MD   ipratropium-albuterol (DUO-NEB) 0.5-2.5 mg/3 ml nebulizer Take 3 mL by nebulization Every 4 (Four) Hours As Needed for Wheezing or Shortness of Air. 12/28/22   Katie Almonte APRN  "  irbesartan (AVAPRO) 75 MG tablet TAKE 1 TABLET BY MOUTH DAILY 4/5/23   Siva Gallegos MD   metoprolol succinate XL (Toprol XL) 25 MG 24 hr tablet Take 1 tablet by mouth Daily. 1/3/23   Siva Gallegos MD   predniSONE (DELTASONE) 20 MG tablet 1 tablet twice a day for 3 days, 1 tablet once a day for 3 days, 1/2 tablet daily for 4 days. 1/6/23   Siva Gallegos MD   tamsulosin (FLOMAX) 0.4 MG capsule 24 hr capsule Take 1 capsule by mouth Daily.    Provider, MD Ruperto   Vitamin D, Cholecalciferol, (CHOLECALCIFEROL) 10 MCG (400 UNIT) tablet Take 1 tablet by mouth Daily.    Provider, MD Ruperto        Social History:   Social History     Tobacco Use    Smoking status: Every Day     Current packs/day: 2.00     Types: Cigarettes     Passive exposure: Never    Smokeless tobacco: Never   Vaping Use    Vaping status: Never Used   Substance Use Topics    Alcohol use: Yes     Alcohol/week: 3.0 standard drinks of alcohol     Types: 3 Standard drinks or equivalent per week    Drug use: Not Currently     Types: Cocaine(coke)         Review of Systems:  Review of Systems   Genitourinary:  Positive for penile pain.        Physical Exam:  /55 (BP Location: Right arm, Patient Position: Sitting)   Pulse 79   Temp 98.8 °F (37.1 °C) (Oral)   Resp 22   Ht 182.9 cm (72\")   Wt (!) 139 kg (305 lb 8.9 oz)   SpO2 94%   BMI 41.44 kg/m²     Physical Exam  Vitals and nursing note reviewed.   Constitutional:       Appearance: Normal appearance. He is obese.   HENT:      Head: Normocephalic and atraumatic.   Eyes:      General: No scleral icterus.  Cardiovascular:      Rate and Rhythm: Normal rate and regular rhythm.      Heart sounds: Normal heart sounds.   Pulmonary:      Effort: Pulmonary effort is normal.      Breath sounds: Normal breath sounds.   Abdominal:      Palpations: Abdomen is soft.      Tenderness: There is no abdominal tenderness.   Genitourinary:     Comments: Patient with Manriquez catheter in place with orange " urine in the bag.  There is some noted discomfort around the penis.  There is no significant drainage noted.  Musculoskeletal:         General: Normal range of motion.      Cervical back: Normal range of motion.   Skin:     Findings: No rash.   Neurological:      General: No focal deficit present.      Mental Status: He is alert.                  Procedures:  Procedures      Medical Decision Making:      Comorbidities that affect care:    Substance Abuse, hypertension    External Notes reviewed:    Reviewed ER note from 4/7/2024      The following orders were placed and all results were independently analyzed by me:  No orders of the defined types were placed in this encounter.      Medications Given in the Emergency Department:  Medications - No data to display     ED Course:    ED Course as of 04/10/24 1211   Wed Apr 10, 2024   1101 Patient requesting to have his Manriquez catheter removed and a trial of voiding.  Will remove catheter and reassess. [MA]      ED Course User Index  [MA] Raphael Feldman MD       Labs:    Lab Results (last 24 hours)       ** No results found for the last 24 hours. **             Imaging:    No Radiology Exams Resulted Within Past 24 Hours      Differential Diagnosis and Discussion:    Dysuria: Differential diagnosis includes but is not limited to urethritis, cystitis, pyelonephritis, ureteral calculi, neoplasm, chemical irritant, urethral stricture, and trauma        MDM     Patient 63-year-old who presents with pain in his penis due to Manriquez catheter.  Manriquez catheter was removed and the patient was able to void.  Patient wished to go home at this time.  Recommend follow-up with urology as scheduled on Friday.  Reginald DENG.  Strong return precautions.          Patient Care Considerations:          Consultants/Shared Management Plan:    None    Social Determinants of Health:    Patient is independent, reliable, and has access to care.       Disposition and Care  Coordination:    Discharged: The patient is suitable and stable for discharge with no need for consideration of admission.    \    Final diagnoses:   Penile pain   Urinary retention        ED Disposition       ED Disposition   Discharge    Condition   Stable    Comment   --               This medical record created using voice recognition software.             Raphael Feldman MD  04/10/24 2577

## 2024-04-19 ENCOUNTER — TELEPHONE (OUTPATIENT)
Dept: SURGERY | Facility: CLINIC | Age: 64
End: 2024-04-19

## 2024-04-19 NOTE — TELEPHONE ENCOUNTER
[facility] received a fax that requires your attention. The document has been indexed to the patient’s chart for your review.      Reason for sending:  [facility]PDATED [facility] FOR APPT 11/11/24    Documents Description: UPDATED [facility]_04/107/24    Name of Sender:  KING GARCIA [facility]-JACK MARC    Date Indexed: 04/19/24

## 2024-05-02 ENCOUNTER — APPOINTMENT (OUTPATIENT)
Dept: GENERAL RADIOLOGY | Facility: HOSPITAL | Age: 64
End: 2024-05-02
Payer: COMMERCIAL

## 2024-05-02 ENCOUNTER — HOSPITAL ENCOUNTER (EMERGENCY)
Facility: HOSPITAL | Age: 64
Discharge: HOME | End: 2024-05-02
Attending: EMERGENCY MEDICINE
Payer: COMMERCIAL

## 2024-05-02 VITALS
BODY MASS INDEX: 32.73 KG/M2 | TEMPERATURE: 97.9 F | HEIGHT: 72 IN | RESPIRATION RATE: 28 BRPM | HEART RATE: 95 BPM | SYSTOLIC BLOOD PRESSURE: 116 MMHG | OXYGEN SATURATION: 99 % | DIASTOLIC BLOOD PRESSURE: 86 MMHG | WEIGHT: 241.62 LBS

## 2024-05-02 DIAGNOSIS — R33.8 ACUTE URINARY RETENTION: Primary | ICD-10-CM

## 2024-05-02 DIAGNOSIS — K59.00 CONSTIPATION, UNSPECIFIED CONSTIPATION TYPE: ICD-10-CM

## 2024-05-02 LAB
ALBUMIN SERPL-MCNC: 3.9 G/DL (ref 3.5–5.2)
ALBUMIN/GLOB SERPL: 1.2 G/DL
ALP SERPL-CCNC: 98 U/L (ref 39–117)
ALT SERPL W P-5'-P-CCNC: 26 U/L (ref 1–41)
ANION GAP SERPL CALCULATED.3IONS-SCNC: 11.8 MMOL/L (ref 5–15)
AST SERPL-CCNC: 18 U/L (ref 1–40)
BACTERIA UR QL AUTO: ABNORMAL /HPF
BASOPHILS # BLD AUTO: 0.11 10*3/MM3 (ref 0–0.2)
BASOPHILS NFR BLD AUTO: 0.8 % (ref 0–1.5)
BILIRUB SERPL-MCNC: 0.4 MG/DL (ref 0–1.2)
BILIRUB UR QL STRIP: NEGATIVE
BUN SERPL-MCNC: 13 MG/DL (ref 8–23)
BUN/CREAT SERPL: 10.4 (ref 7–25)
CALCIUM SPEC-SCNC: 9.4 MG/DL (ref 8.6–10.5)
CHLORIDE SERPL-SCNC: 102 MMOL/L (ref 98–107)
CLARITY UR: CLEAR
CO2 SERPL-SCNC: 22.2 MMOL/L (ref 22–29)
COLOR UR: ABNORMAL
CREAT SERPL-MCNC: 1.25 MG/DL (ref 0.76–1.27)
D-LACTATE SERPL-SCNC: 1.5 MMOL/L (ref 0.5–2)
DEPRECATED RDW RBC AUTO: 45 FL (ref 37–54)
EGFRCR SERPLBLD CKD-EPI 2021: 65.5 ML/MIN/1.73
EOSINOPHIL # BLD AUTO: 0.32 10*3/MM3 (ref 0–0.4)
EOSINOPHIL NFR BLD AUTO: 2.3 % (ref 0.3–6.2)
ERYTHROCYTE [DISTWIDTH] IN BLOOD BY AUTOMATED COUNT: 14 % (ref 12.3–15.4)
GLOBULIN UR ELPH-MCNC: 3.2 GM/DL
GLUCOSE SERPL-MCNC: 114 MG/DL (ref 65–99)
GLUCOSE UR STRIP-MCNC: NEGATIVE MG/DL
HCT VFR BLD AUTO: 46.7 % (ref 37.5–51)
HGB BLD-MCNC: 16 G/DL (ref 13–17.7)
HGB UR QL STRIP.AUTO: ABNORMAL
HOLD SPECIMEN: NORMAL
HOLD SPECIMEN: NORMAL
HYALINE CASTS UR QL AUTO: ABNORMAL /LPF
IMM GRANULOCYTES # BLD AUTO: 0.04 10*3/MM3 (ref 0–0.05)
IMM GRANULOCYTES NFR BLD AUTO: 0.3 % (ref 0–0.5)
KETONES UR QL STRIP: NEGATIVE
LEUKOCYTE ESTERASE UR QL STRIP.AUTO: NEGATIVE
LIPASE SERPL-CCNC: 48 U/L (ref 13–60)
LYMPHOCYTES # BLD AUTO: 3.17 10*3/MM3 (ref 0.7–3.1)
LYMPHOCYTES NFR BLD AUTO: 22.4 % (ref 19.6–45.3)
MCH RBC QN AUTO: 30.5 PG (ref 26.6–33)
MCHC RBC AUTO-ENTMCNC: 34.3 G/DL (ref 31.5–35.7)
MCV RBC AUTO: 89 FL (ref 79–97)
MONOCYTES # BLD AUTO: 0.84 10*3/MM3 (ref 0.1–0.9)
MONOCYTES NFR BLD AUTO: 5.9 % (ref 5–12)
NEUTROPHILS NFR BLD AUTO: 68.3 % (ref 42.7–76)
NEUTROPHILS NFR BLD AUTO: 9.69 10*3/MM3 (ref 1.7–7)
NITRITE UR QL STRIP: POSITIVE
NRBC BLD AUTO-RTO: 0 /100 WBC (ref 0–0.2)
PH UR STRIP.AUTO: 6 [PH] (ref 5–8)
PLATELET # BLD AUTO: 253 10*3/MM3 (ref 140–450)
PMV BLD AUTO: 9.7 FL (ref 6–12)
POTASSIUM SERPL-SCNC: 4.3 MMOL/L (ref 3.5–5.2)
PROT SERPL-MCNC: 7.1 G/DL (ref 6–8.5)
PROT UR QL STRIP: NEGATIVE
RBC # BLD AUTO: 5.25 10*6/MM3 (ref 4.14–5.8)
RBC # UR STRIP: ABNORMAL /HPF
REF LAB TEST METHOD: ABNORMAL
SODIUM SERPL-SCNC: 136 MMOL/L (ref 136–145)
SP GR UR STRIP: 1.01 (ref 1–1.03)
SQUAMOUS #/AREA URNS HPF: ABNORMAL /HPF
UROBILINOGEN UR QL STRIP: ABNORMAL
WBC # UR STRIP: ABNORMAL /HPF
WBC NRBC COR # BLD AUTO: 14.17 10*3/MM3 (ref 3.4–10.8)
WHOLE BLOOD HOLD COAG: NORMAL
WHOLE BLOOD HOLD SPECIMEN: NORMAL

## 2024-05-02 PROCEDURE — 36415 COLL VENOUS BLD VENIPUNCTURE: CPT

## 2024-05-02 PROCEDURE — 51702 INSERT TEMP BLADDER CATH: CPT

## 2024-05-02 PROCEDURE — 85025 COMPLETE CBC W/AUTO DIFF WBC: CPT | Performed by: EMERGENCY MEDICINE

## 2024-05-02 PROCEDURE — 83690 ASSAY OF LIPASE: CPT | Performed by: EMERGENCY MEDICINE

## 2024-05-02 PROCEDURE — 80053 COMPREHEN METABOLIC PANEL: CPT | Performed by: EMERGENCY MEDICINE

## 2024-05-02 PROCEDURE — 74018 RADEX ABDOMEN 1 VIEW: CPT

## 2024-05-02 PROCEDURE — 51798 US URINE CAPACITY MEASURE: CPT

## 2024-05-02 PROCEDURE — 81001 URINALYSIS AUTO W/SCOPE: CPT | Performed by: EMERGENCY MEDICINE

## 2024-05-02 PROCEDURE — 99283 EMERGENCY DEPT VISIT LOW MDM: CPT

## 2024-05-02 PROCEDURE — 83605 ASSAY OF LACTIC ACID: CPT | Performed by: EMERGENCY MEDICINE

## 2024-05-02 RX ORDER — BISACODYL 5 MG/1
5 TABLET, DELAYED RELEASE ORAL DAILY PRN
Qty: 5 TABLET | Refills: 0 | Status: SHIPPED | OUTPATIENT
Start: 2024-05-02 | End: 2024-05-07

## 2024-05-02 RX ORDER — SODIUM CHLORIDE 0.9 % (FLUSH) 0.9 %
10 SYRINGE (ML) INJECTION AS NEEDED
Status: DISCONTINUED | OUTPATIENT
Start: 2024-05-02 | End: 2024-05-02 | Stop reason: HOSPADM

## 2024-05-02 NOTE — ED PROVIDER NOTES
Time: 2:58 AM EDT  Date of encounter:  5/2/2024  Independent Historian/Clinical History and Information was obtained by:   Patient  Chief Complaint: Acute urinary retention    History is limited by: N/A    History of Present Illness:  Patient is a 63 y.o. year old male who presents to the emergency department for evaluation of acute urinary retention.  Patient states that he has been unable to urinate since 6 PM.  He states this is created abdominal pain.  He does feel his abdomen is distended.  He states he had similar symptoms in the beginning of April.  He Manriquez catheter was placed and he followed up with urology.  Manriquez catheter was removed.  He was instructed to make lifestyle changes as he states he drank a lot of alcohol.  The patient denies any fever, rigors or myalgias.  The patient denies any nausea or vomiting.  Patient does note he is constipated.  The patient denies any hematochezia or melena.  The patient is unsure whether he has an enlarged prostate.    HPI    Patient Care Team  Primary Care Provider: Whitney Alvarez PA-C    Past Medical History:     No Known Allergies  Past Medical History:   Diagnosis Date    Essential (primary) hypertension     Hypertension     Mixed hyperlipidemia     Pulmonary nodule     Secondary polycythemia      No past surgical history on file.  No family history on file.    Home Medications:  Prior to Admission medications    Medication Sig Start Date End Date Taking? Authorizing Provider   albuterol (PROAIR RESPICLICK) 108 (90 Base) MCG/ACT inhaler Inhale 2 puffs Every 4 (Four) Hours As Needed for Wheezing. 10/17/22   Siva Gallegos MD   atorvastatin (LIPITOR) 40 MG tablet Take 1 tablet by mouth Daily. 5/19/23   Siva Gallegos MD   benzonatate (TESSALON) 200 MG capsule Take 1 capsule by mouth 3 (Three) Times a Day As Needed for Cough. 12/28/22   Katie Almonte APRN   budesonide (PULMICORT) 0.5 MG/2ML nebulizer solution USE 2 ML VIA NEBULIZER DAILY FOR 30 DAYS  4/10/23   Siva Gallegos MD   ipratropium-albuterol (DUO-NEB) 0.5-2.5 mg/3 ml nebulizer Take 3 mL by nebulization Every 4 (Four) Hours As Needed for Wheezing or Shortness of Air. 12/28/22   Katie Almonte APRN   irbesartan (AVAPRO) 75 MG tablet TAKE 1 TABLET BY MOUTH DAILY 4/5/23   Siva Gallegos MD   metoprolol succinate XL (Toprol XL) 25 MG 24 hr tablet Take 1 tablet by mouth Daily. 1/3/23   Siva Gallegos MD   predniSONE (DELTASONE) 20 MG tablet 1 tablet twice a day for 3 days, 1 tablet once a day for 3 days, 1/2 tablet daily for 4 days. 1/6/23   Siva Gallegos MD   tamsulosin (FLOMAX) 0.4 MG capsule 24 hr capsule Take 1 capsule by mouth Daily.    ProviderRuperto MD   Vitamin D, Cholecalciferol, (CHOLECALCIFEROL) 10 MCG (400 UNIT) tablet Take 1 tablet by mouth Daily.    Provider, MD Ruperto        Social History:   Social History     Tobacco Use    Smoking status: Every Day     Current packs/day: 2.00     Types: Cigarettes     Passive exposure: Never    Smokeless tobacco: Never   Vaping Use    Vaping status: Never Used   Substance Use Topics    Alcohol use: Yes     Alcohol/week: 3.0 standard drinks of alcohol     Types: 3 Standard drinks or equivalent per week    Drug use: Not Currently     Types: Cocaine(coke)         Review of Systems:  Review of Systems   Constitutional:  Negative for chills, diaphoresis and fever.   HENT:  Negative for congestion, postnasal drip, rhinorrhea and sore throat.    Eyes:  Negative for photophobia.   Respiratory:  Negative for cough, chest tightness and shortness of breath.    Cardiovascular:  Negative for chest pain, palpitations and leg swelling.   Gastrointestinal:  Positive for abdominal distention and abdominal pain. Negative for diarrhea, nausea and vomiting.   Genitourinary:  Positive for enuresis. Negative for difficulty urinating, dysuria, flank pain, frequency, hematuria and urgency.   Musculoskeletal:  Negative for neck pain and neck stiffness.   Skin:  " Negative for pallor and rash.   Neurological:  Negative for dizziness, syncope, weakness, numbness and headaches.   Hematological:  Negative for adenopathy. Does not bruise/bleed easily.   Psychiatric/Behavioral: Negative.          Physical Exam:  /86 (BP Location: Left arm, Patient Position: Sitting)   Pulse 95   Temp 97.9 °F (36.6 °C) (Oral)   Resp 28   Ht 182.9 cm (72\")   Wt 110 kg (241 lb 10 oz)   SpO2 99%   BMI 32.77 kg/m²     Physical Exam  Vitals and nursing note reviewed.   Constitutional:       General: He is not in acute distress.     Appearance: Normal appearance. He is not ill-appearing, toxic-appearing or diaphoretic.   HENT:      Head: Normocephalic and atraumatic.      Mouth/Throat:      Mouth: Mucous membranes are moist.   Eyes:      Pupils: Pupils are equal, round, and reactive to light.   Cardiovascular:      Rate and Rhythm: Normal rate and regular rhythm.      Pulses: Normal pulses.           Carotid pulses are 2+ on the right side and 2+ on the left side.       Radial pulses are 2+ on the right side and 2+ on the left side.        Femoral pulses are 2+ on the right side and 2+ on the left side.       Popliteal pulses are 2+ on the right side and 2+ on the left side.        Dorsalis pedis pulses are 2+ on the right side and 2+ on the left side.        Posterior tibial pulses are 2+ on the right side and 2+ on the left side.      Heart sounds: Normal heart sounds. No murmur heard.  Pulmonary:      Effort: Pulmonary effort is normal. No accessory muscle usage, respiratory distress or retractions.      Breath sounds: Normal breath sounds. No wheezing, rhonchi or rales.   Abdominal:      General: Abdomen is flat. There is distension.      Palpations: Abdomen is soft. There is no mass or pulsatile mass.      Tenderness: There is no abdominal tenderness. There is no right CVA tenderness, left CVA tenderness, guarding or rebound.      Comments: No rigidity   Musculoskeletal:         " General: No swelling, tenderness or deformity.      Cervical back: Neck supple. No tenderness.      Right lower leg: No edema.      Left lower leg: No edema.   Skin:     General: Skin is warm and dry.      Capillary Refill: Capillary refill takes less than 2 seconds.      Coloration: Skin is not jaundiced or pale.      Findings: No erythema.   Neurological:      General: No focal deficit present.      Mental Status: He is alert and oriented to person, place, and time. Mental status is at baseline.      Cranial Nerves: Cranial nerves 2-12 are intact. No cranial nerve deficit.      Sensory: Sensation is intact. No sensory deficit.      Motor: Motor function is intact. No weakness or pronator drift.      Coordination: Coordination is intact. Coordination normal.   Psychiatric:         Mood and Affect: Mood normal.         Behavior: Behavior normal.                  Procedures:  Procedures      Medical Decision Making:      Comorbidities that affect care:    Hypertension, hyperlipidemia    External Notes reviewed:    None      The following orders were placed and all results were independently analyzed by me:  Orders Placed This Encounter   Procedures    XR Abdomen KUB    Model Draw    Comprehensive Metabolic Panel    Lipase    Urinalysis With Microscopic If Indicated (No Culture) - Urine, Clean Catch    Lactic Acid, Plasma    CBC Auto Differential    Urinalysis, Microscopic Only - Urine, Clean Catch    NPO Diet NPO Type: Strict NPO    Undress & Gown    Bladder scan    Insert Indwelling Urinary Catheter    Assess Need for Indwelling Urinary Catheter - Follow Removal Protocol    Urinary Catheter Care    Insert Peripheral IV    CBC & Differential    Green Top (Gel)    Lavender Top    Gold Top - SST    Light Blue Top       Medications Given in the Emergency Department:  Medications   sodium chloride 0.9 % flush 10 mL (has no administration in time range)        ED Course:         Labs:    Lab Results (last 24 hours)        Procedure Component Value Units Date/Time    CBC & Differential [[id number]]  (Abnormal) Collected: 05/02/24 0222    Specimen: Blood Updated: 05/02/24 0239    Narrative:      The following orders were created for panel order CBC & Differential.  Procedure                               Abnormality         Status                     ---------                               -----------         ------                     CBC Auto Differential[[id number]]        Abnormal            Final result                 Please view results for these tests on the individual orders.    Comprehensive Metabolic Panel [[id number]]  (Abnormal) Collected: 05/02/24 0222    Specimen: Blood Updated: 05/02/24 0304     Glucose 114 mg/dL      BUN 13 mg/dL      Creatinine 1.25 mg/dL      Sodium 136 mmol/L      Potassium 4.3 mmol/L      Comment: Slight hemolysis detected by analyzer. Result may be falsely elevated.        Chloride 102 mmol/L      CO2 22.2 mmol/L      Calcium 9.4 mg/dL      Total Protein 7.1 g/dL      Albumin 3.9 g/dL      ALT (SGPT) 26 U/L      AST (SGOT) 18 U/L      Alkaline Phosphatase 98 U/L      Total Bilirubin 0.4 mg/dL      Globulin 3.2 gm/dL      A/G Ratio 1.2 g/dL      BUN/Creatinine Ratio 10.4     Anion Gap 11.8 mmol/L      eGFR 65.5 mL/min/1.73     Narrative:      GFR Normal >60  Chronic Kidney Disease <60  Kidney Failure <15      Lipase [[id number]]  (Normal) Collected: 05/02/24 0222    Specimen: Blood Updated: 05/02/24 0304     Lipase 48 U/L     Lactic Acid, Plasma [[id number]]  (Normal) Collected: 05/02/24 0222    Specimen: Blood Updated: 05/02/24 0300     Lactate 1.5 mmol/L     CBC Auto Differential [[id number]]  (Abnormal) Collected: 05/02/24 0222    Specimen: Blood Updated: 05/02/24 0239     WBC 14.17 10*3/mm3      RBC 5.25 10*6/mm3      Hemoglobin 16.0 g/dL      Hematocrit 46.7 %      MCV 89.0 fL      MCH 30.5 pg      MCHC 34.3 g/dL      RDW 14.0 %      RDW-SD 45.0 fl      MPV 9.7 fL      Platelets 253 10*3/mm3       Neutrophil % 68.3 %      Lymphocyte % 22.4 %      Monocyte % 5.9 %      Eosinophil % 2.3 %      Basophil % 0.8 %      Immature Grans % 0.3 %      Neutrophils, Absolute 9.69 10*3/mm3      Lymphocytes, Absolute 3.17 10*3/mm3      Monocytes, Absolute 0.84 10*3/mm3      Eosinophils, Absolute 0.32 10*3/mm3      Basophils, Absolute 0.11 10*3/mm3      Immature Grans, Absolute 0.04 10*3/mm3      nRBC 0.0 /100 WBC     Urinalysis With Microscopic If Indicated (No Culture) - Urine, Clean Catch [[id number]]  (Abnormal) Collected: 05/02/24 0348    Specimen: Urine, Clean Catch Updated: 05/02/24 0421     Color, UA Dark Yellow     Appearance, UA Clear     pH, UA 6.0     Specific Gravity, UA 1.012     Glucose, UA Negative     Ketones, UA Negative     Bilirubin, UA Negative     Blood, UA Trace     Protein, UA Negative     Leuk Esterase, UA Negative     Nitrite, UA Positive     Urobilinogen, UA 1.0 E.U./dL    Urinalysis, Microscopic Only - Urine, Clean Catch [[id number]]  (Abnormal) Collected: 05/02/24 0348    Specimen: Urine, Clean Catch Updated: 05/02/24 0421     RBC, UA 3-5 /HPF      WBC, UA 0-2 /HPF      Bacteria, UA None Seen /HPF      Squamous Epithelial Cells, UA 0-2 /HPF      Hyaline Casts, UA None Seen /LPF      Methodology Automated Microscopy             Imaging:    XR Abdomen KUB    Result Date: 5/2/2024  XR ABDOMEN KUB-  Date of Exam: 5/2/2024 3:10 AM  Indication: Constipation; R33.8-Other retention of urine  Comparison: PET/CT 8/31/2020  Findings: There is multilevel lumbar degenerative disease. Moderate stool burden is present. No bowel obstruction. Calcifications in the pelvis are most compatible with phleboliths. No foreign body.      Evidence of some constipation. No bowel obstruction. Calcifications in the pelvis are most consistent with phleboliths.   Electronically Signed By-Dr. Doug Magana MD On:5/2/2024 3:36 AM         Differential Diagnosis and Discussion:    Abdominal Pain: Based on the patient's signs  and symptoms, I considered abdominal aortic aneurysm, small bowel obstruction, pancreatitis, acute cholecystitis, acute appendecitis, peptic ulcer disease, gastritis, colitis, endocrine disorders, irritable bowel syndrome and other differential diagnosis an etiology of the patient's abdominal pain.    All labs were reviewed and interpreted by me.    MDM  Number of Diagnoses or Management Options  Acute urinary retention  Constipation, unspecified constipation type  Diagnosis management comments: The patient's CMP was reviewed and shows no abnormalities of critical concern.  Of note, the patient's sodium and potassium are acceptable.  The patient's liver enzymes are unremarkable.  The patient's renal function including creatinine is preserved.  The patient has a normal anion gap.    The patient's CBC was reviewed and shows no abnormalities of critical concern.  Of note, there is no anemia requiring a blood transfusion and the platelet count is acceptable    KUB demonstrates moderate stool burden.  There is no evidence of bowel obstruction    Manriquez catheter was placed, 800 cc of urine was obtained from the bladder immediately.  The patient notes that he immediately felt much better    At the time of discharge, the patient appeared very well, no distress and nontoxic.  The patient states his pain is almost entirely gone after the Manriquez catheter was placed.  The x-ray did show some constipation.  The patient be placed on Dulcolax.  The patient be referred to urology.  The patient was given leg bag teaching.  He did voiced understanding.  The patient will maintain the Manriquez in until removed by the urologist.    I have spoken with this patient.  I have explained the patient's condition, diagnosis and treatment plan based on the information available to me at this time.  I have answered the patient's questions and addressed any concerns.  The patient has a good understanding of the patient's diagnosis condition and  treatment plan as can be expected at this point.  The vital signs have been stable.  The patient's condition is stable and appropriate for discharge from the emergency department.     Patient will pursue further outpatient evaluation with the primary care physician or other designated or consulting physicians as outlined in the discharge instruction.  The patient is agreeable to this plan of care and follow-up instructions have been explained in detail.  The patient has received these instructions in written format and has expressed understanding of the discharge instructions.  The patient is aware that any significant change in condition or worsening of symptoms should prompt immediate return to this or the closest emergency department or call 911       Amount and/or Complexity of Data Reviewed  Clinical lab tests: reviewed  Tests in the radiology section of CPT®: reviewed         Social Determinants of Health:    Patient is independent, reliable, and has access to care.       Disposition and Care Coordination:    Discharged: The patient is suitable and stable for discharge with no need for consideration of admission.    I have explained discharge medications and the need for follow up with the patient/caretakers. This was also printed in the discharge instructions. Patient was discharged with the following medications and follow up:      Medication List        New Prescriptions      bisacodyl 5 MG EC tablet  Commonly known as: bisacodyl  Take 1 tablet by mouth Daily As Needed for Constipation for up to 5 days.               Where to Get Your Medications        These medications were sent to [id number] - [place], [place] - [address] AT [facility] - [phone]  - [phone] FX  [address] [place] [address] [place] [address]      Phone: [phone]   bisacodyl 5 MG EC tablet      Emily Osman MD  [address]   [place] [address]  [phone]    On  5/6/2024  Acute urinary retention       Final diagnoses:   Acute urinary retention   Constipation, unspecified constipation type        ED Disposition       ED Disposition   Discharge    Condition   Stable    Comment   --               This medical record created using voice recognition software.             Chris Augustin DO  05/02/24 0426

## 2024-05-02 NOTE — DISCHARGE INSTRUCTIONS
Please perform leg bag exchange as instructed by the nursing staff as needed.    Please follow-up with the urologist as directed    Please return to emergency immediately for intractable pain, vomiting, fever, near passing out, passing out, unusual fatigue or any new symptoms you are concerned with

## 2024-05-07 ENCOUNTER — TELEPHONE (OUTPATIENT)
Dept: UROLOGY | Facility: CLINIC | Age: 64
End: 2024-05-07

## 2024-06-18 ENCOUNTER — APPOINTMENT (OUTPATIENT)
Dept: GENERAL RADIOLOGY | Facility: HOSPITAL | Age: 64
End: 2024-06-18
Payer: OTHER GOVERNMENT

## 2024-06-18 ENCOUNTER — HOSPITAL ENCOUNTER (OUTPATIENT)
Facility: HOSPITAL | Age: 64
Setting detail: OBSERVATION
Discharge: HOME | End: 2024-06-19
Attending: EMERGENCY MEDICINE | Admitting: STUDENT IN AN ORGANIZED HEALTH CARE EDUCATION/TRAINING PROGRAM
Payer: OTHER GOVERNMENT

## 2024-06-18 ENCOUNTER — APPOINTMENT (OUTPATIENT)
Dept: CARDIOLOGY | Facility: HOSPITAL | Age: 64
End: 2024-06-18
Payer: OTHER GOVERNMENT

## 2024-06-18 DIAGNOSIS — I48.91 ATRIAL FIBRILLATION WITH RAPID VENTRICULAR RESPONSE: Primary | ICD-10-CM

## 2024-06-18 LAB
ALBUMIN SERPL-MCNC: 4.2 G/DL (ref 3.5–5.2)
ALBUMIN/GLOB SERPL: 1.5 G/DL
ALP SERPL-CCNC: 109 U/L (ref 39–117)
ALT SERPL W P-5'-P-CCNC: 18 U/L (ref 1–41)
ANION GAP SERPL CALCULATED.3IONS-SCNC: 13.4 MMOL/L (ref 5–15)
AST SERPL-CCNC: 15 U/L (ref 1–40)
BACTERIA UR QL AUTO: ABNORMAL /HPF
BASOPHILS # BLD AUTO: 0.1 10*3/MM3 (ref 0–0.2)
BASOPHILS NFR BLD AUTO: 0.8 % (ref 0–1.5)
BILIRUB SERPL-MCNC: 0.8 MG/DL (ref 0–1.2)
BILIRUB UR QL STRIP: NEGATIVE
BUN SERPL-MCNC: 16 MG/DL (ref 8–23)
BUN/CREAT SERPL: 11.6 (ref 7–25)
CALCIUM SPEC-SCNC: 9.3 MG/DL (ref 8.6–10.5)
CHLORIDE SERPL-SCNC: 103 MMOL/L (ref 98–107)
CLARITY UR: CLEAR
CO2 SERPL-SCNC: 21.6 MMOL/L (ref 22–29)
COLOR UR: ABNORMAL
CREAT SERPL-MCNC: 1.38 MG/DL (ref 0.76–1.27)
D-LACTATE SERPL-SCNC: 1.5 MMOL/L (ref 0.5–2)
DEPRECATED RDW RBC AUTO: 46.6 FL (ref 37–54)
EGFRCR SERPLBLD CKD-EPI 2021: 58.2 ML/MIN/1.73
EOSINOPHIL # BLD AUTO: 0.24 10*3/MM3 (ref 0–0.4)
EOSINOPHIL NFR BLD AUTO: 1.9 % (ref 0.3–6.2)
ERYTHROCYTE [DISTWIDTH] IN BLOOD BY AUTOMATED COUNT: 14.5 % (ref 12.3–15.4)
GEN 5 2HR TROPONIN T REFLEX: 11 NG/L
GLOBULIN UR ELPH-MCNC: 2.8 GM/DL
GLUCOSE SERPL-MCNC: 107 MG/DL (ref 65–99)
GLUCOSE UR STRIP-MCNC: NEGATIVE MG/DL
HCT VFR BLD AUTO: 47.8 % (ref 37.5–51)
HGB BLD-MCNC: 16.4 G/DL (ref 13–17.7)
HGB UR QL STRIP.AUTO: NEGATIVE
HOLD SPECIMEN: NORMAL
HOLD SPECIMEN: NORMAL
HYALINE CASTS UR QL AUTO: ABNORMAL /LPF
IMM GRANULOCYTES # BLD AUTO: 0.04 10*3/MM3 (ref 0–0.05)
IMM GRANULOCYTES NFR BLD AUTO: 0.3 % (ref 0–0.5)
KETONES UR QL STRIP: ABNORMAL
LEUKOCYTE ESTERASE UR QL STRIP.AUTO: ABNORMAL
LIPASE SERPL-CCNC: 28 U/L (ref 13–60)
LYMPHOCYTES # BLD AUTO: 3.08 10*3/MM3 (ref 0.7–3.1)
LYMPHOCYTES NFR BLD AUTO: 24.3 % (ref 19.6–45.3)
MAGNESIUM SERPL-MCNC: 2 MG/DL (ref 1.6–2.4)
MCH RBC QN AUTO: 30.6 PG (ref 26.6–33)
MCHC RBC AUTO-ENTMCNC: 34.3 G/DL (ref 31.5–35.7)
MCV RBC AUTO: 89.2 FL (ref 79–97)
MONOCYTES # BLD AUTO: 0.81 10*3/MM3 (ref 0.1–0.9)
MONOCYTES NFR BLD AUTO: 6.4 % (ref 5–12)
NEUTROPHILS NFR BLD AUTO: 66.3 % (ref 42.7–76)
NEUTROPHILS NFR BLD AUTO: 8.41 10*3/MM3 (ref 1.7–7)
NITRITE UR QL STRIP: NEGATIVE
NRBC BLD AUTO-RTO: 0 /100 WBC (ref 0–0.2)
NT-PROBNP SERPL-MCNC: 2600 PG/ML (ref 0–900)
PH UR STRIP.AUTO: 6 [PH] (ref 5–8)
PLATELET # BLD AUTO: 298 10*3/MM3 (ref 140–450)
PMV BLD AUTO: 9.6 FL (ref 6–12)
POTASSIUM SERPL-SCNC: 4 MMOL/L (ref 3.5–5.2)
PROT SERPL-MCNC: 7 G/DL (ref 6–8.5)
PROT UR QL STRIP: ABNORMAL
RBC # BLD AUTO: 5.36 10*6/MM3 (ref 4.14–5.8)
RBC # UR STRIP: ABNORMAL /HPF
REF LAB TEST METHOD: ABNORMAL
SODIUM SERPL-SCNC: 138 MMOL/L (ref 136–145)
SP GR UR STRIP: 1.02 (ref 1–1.03)
SQUAMOUS #/AREA URNS HPF: ABNORMAL /HPF
T-UPTAKE NFR SERPL: 1 TBI (ref 0.8–1.3)
T4 SERPL-MCNC: 7.16 MCG/DL (ref 4.5–11.7)
TROPONIN T DELTA: -2 NG/L
TROPONIN T SERPL HS-MCNC: 13 NG/L
TSH SERPL DL<=0.05 MIU/L-ACNC: 1.07 UIU/ML (ref 0.27–4.2)
UROBILINOGEN UR QL STRIP: ABNORMAL
WBC # UR STRIP: ABNORMAL /HPF
WBC NRBC COR # BLD AUTO: 12.68 10*3/MM3 (ref 3.4–10.8)
WHOLE BLOOD HOLD COAG: NORMAL
WHOLE BLOOD HOLD SPECIMEN: NORMAL

## 2024-06-18 PROCEDURE — 93010 ELECTROCARDIOGRAM REPORT: CPT | Performed by: INTERNAL MEDICINE

## 2024-06-18 PROCEDURE — 96366 THER/PROPH/DIAG IV INF ADDON: CPT

## 2024-06-18 PROCEDURE — 84443 ASSAY THYROID STIM HORMONE: CPT | Performed by: STUDENT IN AN ORGANIZED HEALTH CARE EDUCATION/TRAINING PROGRAM

## 2024-06-18 PROCEDURE — 93005 ELECTROCARDIOGRAM TRACING: CPT | Performed by: EMERGENCY MEDICINE

## 2024-06-18 PROCEDURE — G0378 HOSPITAL OBSERVATION PER HR: HCPCS

## 2024-06-18 PROCEDURE — 93005 ELECTROCARDIOGRAM TRACING: CPT

## 2024-06-18 PROCEDURE — 93005 ELECTROCARDIOGRAM TRACING: CPT | Performed by: STUDENT IN AN ORGANIZED HEALTH CARE EDUCATION/TRAINING PROGRAM

## 2024-06-18 PROCEDURE — 99285 EMERGENCY DEPT VISIT HI MDM: CPT

## 2024-06-18 PROCEDURE — 87086 URINE CULTURE/COLONY COUNT: CPT | Performed by: EMERGENCY MEDICINE

## 2024-06-18 PROCEDURE — 83690 ASSAY OF LIPASE: CPT | Performed by: EMERGENCY MEDICINE

## 2024-06-18 PROCEDURE — 84484 ASSAY OF TROPONIN QUANT: CPT | Performed by: EMERGENCY MEDICINE

## 2024-06-18 PROCEDURE — 84479 ASSAY OF THYROID (T3 OR T4): CPT | Performed by: STUDENT IN AN ORGANIZED HEALTH CARE EDUCATION/TRAINING PROGRAM

## 2024-06-18 PROCEDURE — 99291 CRITICAL CARE FIRST HOUR: CPT

## 2024-06-18 PROCEDURE — 25810000003 SODIUM CHLORIDE 0.9 % SOLUTION: Performed by: EMERGENCY MEDICINE

## 2024-06-18 PROCEDURE — 83880 ASSAY OF NATRIURETIC PEPTIDE: CPT | Performed by: EMERGENCY MEDICINE

## 2024-06-18 PROCEDURE — 96365 THER/PROPH/DIAG IV INF INIT: CPT

## 2024-06-18 PROCEDURE — 96376 TX/PRO/DX INJ SAME DRUG ADON: CPT

## 2024-06-18 PROCEDURE — 36415 COLL VENOUS BLD VENIPUNCTURE: CPT

## 2024-06-18 PROCEDURE — 93306 TTE W/DOPPLER COMPLETE: CPT

## 2024-06-18 PROCEDURE — 81001 URINALYSIS AUTO W/SCOPE: CPT | Performed by: EMERGENCY MEDICINE

## 2024-06-18 PROCEDURE — 71045 X-RAY EXAM CHEST 1 VIEW: CPT

## 2024-06-18 PROCEDURE — 93306 TTE W/DOPPLER COMPLETE: CPT | Performed by: INTERNAL MEDICINE

## 2024-06-18 PROCEDURE — 25010000002 SULFUR HEXAFLUORIDE MICROSPH 60.7-25 MG RECONSTITUTED SUSPENSION: Performed by: STUDENT IN AN ORGANIZED HEALTH CARE EDUCATION/TRAINING PROGRAM

## 2024-06-18 PROCEDURE — 85025 COMPLETE CBC W/AUTO DIFF WBC: CPT | Performed by: EMERGENCY MEDICINE

## 2024-06-18 PROCEDURE — 80053 COMPREHEN METABOLIC PANEL: CPT | Performed by: EMERGENCY MEDICINE

## 2024-06-18 PROCEDURE — 83735 ASSAY OF MAGNESIUM: CPT | Performed by: EMERGENCY MEDICINE

## 2024-06-18 PROCEDURE — 87040 BLOOD CULTURE FOR BACTERIA: CPT | Performed by: EMERGENCY MEDICINE

## 2024-06-18 PROCEDURE — 83605 ASSAY OF LACTIC ACID: CPT | Performed by: EMERGENCY MEDICINE

## 2024-06-18 PROCEDURE — 84436 ASSAY OF TOTAL THYROXINE: CPT | Performed by: STUDENT IN AN ORGANIZED HEALTH CARE EDUCATION/TRAINING PROGRAM

## 2024-06-18 PROCEDURE — 99222 1ST HOSP IP/OBS MODERATE 55: CPT | Performed by: STUDENT IN AN ORGANIZED HEALTH CARE EDUCATION/TRAINING PROGRAM

## 2024-06-18 RX ORDER — ASPIRIN 81 MG/1
324 TABLET, CHEWABLE ORAL ONCE
Status: DISCONTINUED | OUTPATIENT
Start: 2024-06-18 | End: 2024-06-18

## 2024-06-18 RX ORDER — AMOXICILLIN 250 MG
2 CAPSULE ORAL 2 TIMES DAILY PRN
Status: DISCONTINUED | OUTPATIENT
Start: 2024-06-18 | End: 2024-06-19 | Stop reason: HOSPADM

## 2024-06-18 RX ORDER — MIDODRINE HYDROCHLORIDE 2.5 MG/1
2.5 TABLET ORAL 2 TIMES DAILY
COMMUNITY
End: 2024-06-19 | Stop reason: HOSPADM

## 2024-06-18 RX ORDER — BUPROPION HYDROCHLORIDE 75 MG/1
150 TABLET ORAL 2 TIMES DAILY
COMMUNITY

## 2024-06-18 RX ORDER — SODIUM CHLORIDE 9 MG/ML
40 INJECTION, SOLUTION INTRAVENOUS AS NEEDED
Status: DISCONTINUED | OUTPATIENT
Start: 2024-06-18 | End: 2024-06-19 | Stop reason: HOSPADM

## 2024-06-18 RX ORDER — ERGOCALCIFEROL 1.25 MG/1
50000 CAPSULE ORAL WEEKLY
COMMUNITY

## 2024-06-18 RX ORDER — DILTIAZEM HYDROCHLORIDE 5 MG/ML
20 INJECTION INTRAVENOUS ONCE
Status: COMPLETED | OUTPATIENT
Start: 2024-06-18 | End: 2024-06-18

## 2024-06-18 RX ORDER — POLYETHYLENE GLYCOL 3350 17 G/17G
17 POWDER, FOR SOLUTION ORAL DAILY PRN
Status: DISCONTINUED | OUTPATIENT
Start: 2024-06-18 | End: 2024-06-19 | Stop reason: HOSPADM

## 2024-06-18 RX ORDER — BUPROPION HYDROCHLORIDE 75 MG/1
150 TABLET ORAL 2 TIMES DAILY
Status: DISCONTINUED | OUTPATIENT
Start: 2024-06-18 | End: 2024-06-19 | Stop reason: HOSPADM

## 2024-06-18 RX ORDER — BISACODYL 10 MG
10 SUPPOSITORY, RECTAL RECTAL DAILY PRN
Status: DISCONTINUED | OUTPATIENT
Start: 2024-06-18 | End: 2024-06-19 | Stop reason: HOSPADM

## 2024-06-18 RX ORDER — SODIUM CHLORIDE 0.9 % (FLUSH) 0.9 %
10 SYRINGE (ML) INJECTION AS NEEDED
Status: DISCONTINUED | OUTPATIENT
Start: 2024-06-18 | End: 2024-06-19 | Stop reason: HOSPADM

## 2024-06-18 RX ORDER — MIDODRINE HYDROCHLORIDE 2.5 MG/1
2.5 TABLET ORAL
Status: DISCONTINUED | OUTPATIENT
Start: 2024-06-18 | End: 2024-06-19

## 2024-06-18 RX ORDER — SILDENAFIL 100 MG/1
100 TABLET, FILM COATED ORAL DAILY PRN
COMMUNITY
End: 2024-06-19 | Stop reason: HOSPADM

## 2024-06-18 RX ORDER — DILTIAZEM HCL IN NACL,ISO-OSM 125 MG/125
5-15 PLASTIC BAG, INJECTION (ML) INTRAVENOUS
Status: DISCONTINUED | OUTPATIENT
Start: 2024-06-18 | End: 2024-06-19

## 2024-06-18 RX ORDER — BISACODYL 5 MG/1
5 TABLET, DELAYED RELEASE ORAL DAILY PRN
Status: DISCONTINUED | OUTPATIENT
Start: 2024-06-18 | End: 2024-06-19 | Stop reason: HOSPADM

## 2024-06-18 RX ORDER — SODIUM CHLORIDE 0.9 % (FLUSH) 0.9 %
10 SYRINGE (ML) INJECTION EVERY 12 HOURS SCHEDULED
Status: DISCONTINUED | OUTPATIENT
Start: 2024-06-18 | End: 2024-06-19 | Stop reason: HOSPADM

## 2024-06-18 RX ORDER — ETOMIDATE 2 MG/ML
10 INJECTION INTRAVENOUS ONCE
Status: DISCONTINUED | OUTPATIENT
Start: 2024-06-18 | End: 2024-06-18

## 2024-06-18 RX ORDER — TIOTROPIUM BROMIDE 18 UG/1
1 CAPSULE ORAL; RESPIRATORY (INHALATION)
COMMUNITY

## 2024-06-18 RX ORDER — METOPROLOL SUCCINATE 50 MG/1
1.5 TABLET, EXTENDED RELEASE ORAL DAILY
COMMUNITY
End: 2024-06-19 | Stop reason: HOSPADM

## 2024-06-18 RX ORDER — ATORVASTATIN CALCIUM 40 MG/1
40 TABLET, FILM COATED ORAL NIGHTLY
Status: DISCONTINUED | OUTPATIENT
Start: 2024-06-19 | End: 2024-06-19 | Stop reason: HOSPADM

## 2024-06-18 RX ADMIN — SULFUR HEXAFLUORIDE 5 ML: KIT at 20:27

## 2024-06-18 RX ADMIN — DILTIAZEM HYDROCHLORIDE 5 MG/HR: 5 INJECTION INTRAVENOUS at 13:52

## 2024-06-18 RX ADMIN — DILTIAZEM HYDROCHLORIDE 20 MG: 5 INJECTION, SOLUTION INTRAVENOUS at 12:21

## 2024-06-18 RX ADMIN — MIDODRINE HYDROCHLORIDE 2.5 MG: 2.5 TABLET ORAL at 20:19

## 2024-06-18 RX ADMIN — APIXABAN 5 MG: 5 TABLET, FILM COATED ORAL at 16:12

## 2024-06-18 RX ADMIN — SODIUM CHLORIDE 500 ML: 9 INJECTION, SOLUTION INTRAVENOUS at 13:14

## 2024-06-18 RX ADMIN — SODIUM CHLORIDE 500 ML: 9 INJECTION, SOLUTION INTRAVENOUS at 12:33

## 2024-06-18 RX ADMIN — BUPROPION HYDROCHLORIDE 150 MG: 75 TABLET ORAL at 20:19

## 2024-06-18 RX ADMIN — Medication 10 ML: at 20:19

## 2024-06-18 RX ADMIN — SODIUM CHLORIDE 1000 ML: 9 INJECTION, SOLUTION INTRAVENOUS at 13:51

## 2024-06-18 NOTE — CASE MANAGEMENT/SOCIAL WORK
Discharge Planning Assessment   Al[facility]     Patient Name: Sukhdeep Valladares  MRN: [id number]  Today's Date: 6/18/2024    Admit Date: 6/18/2024        Discharge Needs Assessment       Row Name 06/18/24 1647       Living Environment    People in Home child(jose alejandro), adult;spouse (P)     Current Living Arrangements home (P)     In the past 12 months has the electric, gas, oil, or water company threatened to shut off services in your home? No (P)     Primary Care Provided by self (P)     Provides Primary Care For no one (P)     Able to Return to Prior Arrangements yes (P)        Resource/Environmental Concerns    Resource/Environmental Concerns none (P)     Transportation Concerns none (P)        Transportation Needs    In the past 12 months, has lack of transportation kept you from medical appointments or from getting medications? no (P)     In the past 12 months, has lack of transportation kept you from meetings, work, or from getting things needed for daily living? No (P)        Food Insecurity    Within the past 12 months, you worried that your food would run out before you got the money to buy more. Never true (P)     Within the past 12 months, the food you bought just didn't last and you didn't have money to get more. Never true (P)        Transition Planning    Patient/Family Anticipates Transition to home (P)     Patient/Family Anticipated Services at Transition none (P)     Transportation Anticipated family or friend will provide (P)        Discharge Needs Assessment    Readmission Within the Last 30 Days no previous admission in last 30 days (P)     Equipment Currently Used at Home cpap;bp cuff;other (see comments) (P)   compression socks    Concerns to be Addressed denies needs/concerns at this time (P)     Equipment Needed After Discharge none (P)                    Discharge Plan    No documentation.                 Continued Care and Services - Admitted Since 6/18/2024    No active coordination exists for  this encounter.          Demographic Summary       Row Name 06/18/24 1642       General Information    Admission Type inpatient (P)     Arrived From home (P)     Referral Source emergency department (P)     Reason for Consult discharge planning (P)     Preferred Language English (P)                    Functional Status       Row Name 06/18/24 1646       Functional Status    Usual Activity Tolerance moderate (P)     Current Activity Tolerance moderate (P)        Physical Activity    On average, how many days per week do you engage in moderate to strenuous exercise (like a brisk walk)? 0 days (P)     On average, how many minutes do you engage in exercise at this level? 0 min (P)     Number of minutes of exercise per week 0 (P)        Assessment of Health Literacy    How often do you have someone help you read hospital materials? Sometimes (P)     How often do you have problems learning about your medical condition because of difficulty understanding written information? Sometimes (P)     How often do you have a problem understanding what is told to you about your medical condition? Sometimes (P)     How confident are you filling out medical forms by yourself? Somewhat (P)     Health Literacy Moderate (P)        Functional Status, IADL    Medications independent (P)     Meal Preparation independent (P)     Housekeeping independent (P)     Laundry independent (P)     Shopping independent (P)        Mental Status    General Appearance WDL WDL (P)        Mental Status Summary    Recent Changes in Mental Status/Cognitive Functioning no changes (P)                    Psychosocial    No documentation.                  Abuse/Neglect       Row Name 06/18/24 7806       Personal Safety    Feels Unsafe at Home or Work/School no (P)     Feels Threatened by Someone no (P)     Does Anyone Try to Keep You From Having Contact with Others or Doing Things Outside Your Home? no (P)     Physical Signs of Abuse Present no (P)                     Legal       Row Name 06/18/24 1647       Financial Resource Strain    How hard is it for you to pay for the very basics like food, housing, medical care, and heating? Not very (P)                    Substance Abuse    No documentation.                  Patient Forms    No documentation.                 NADER student met with pt bedside in ED to complete discharge needs assessment. Pt spouse was present bedside, too. [facility] Pt denies having POA/living will. Pt resides at home with his adult daughter and spouse. Pt reports being able to complete ADLs independently. Pt denies transportation concerns. Spouse will drive pt home at discharge. [facility] period a little over 30 days ago. Pt has a cpap, blood pressure cuff, and compression socks at home. Pt denies any needs upon discharge at this time. Pt denies substance use. Pt smokes about a 1/2 pack of cigarettes a day. Pt reports drinking alcohol. Pt reports alcohol of choice is beer. Pt reports drinking 5-6 drinks in one sitting. Pt reports drinking alcohol 2-3 times a week. Pt reports drinking more than 6 drinks in one sitting monthly. Pt reports having some stress over the last 6 months. Pt reports having PTSD and that [facility]. Pt reports being prescribed Welbutrin at this time. Pt reports he is about to start another medication for mental health but he is unsure what it is. Pt denies any concerns with concentrating or remembering things. Pt reports feeling safe at home.  [employer], Social Work Student

## 2024-06-18 NOTE — PAYOR COMM NOTE
"Sukhdeep Valladares Jr (63 y.o. Male)     [facility] RE[id number]     Admitted via ED.     PATIENT INFORMATION  Name:  Sukhdeep Valladares  MRN#:     [id number]  :  1960         ADMISSION INFORMATION  CLASS: Observation   DOS:  24      CURRENT ATTENDING PROVIDER INFORMATION  Name/NPI: Tali Mancera MD [[id number]]  Phone: Phone: [phone]  Fax:  [phone]      REQUESTING PROVIDER and RENDERING FACILITY  Name:  [facility]   NPI:  [id number]  TID:  [id number]  Address:      [address] [place] [address]  Phone: [phone]  Fax:  [phone]      UTILIZATION REVIEW CONTACT INFORMATION  Phone:      [phone]  Fax:           [phone]      ADMISSION DIAGNOSIS  Atrial fibrillation with rapid ventricular response [I48.91]  Atrial fibrillation with RVR [I48.91]      ++++++++++++++++++++++++++++++++++++++++++++++++++++++++++++++++++++++++++++++++        Date of Birth   1960    Social Security Number       Address   [address] [place], [place] [address]    Home Phone   [phone]    MRN   [id number]       Sikhism   Congregational    Marital Status                               Admission Date   24    Admission Type   Emergency    Admitting Provider   Tali Mancera MD    Attending Provider   Tali Mancera MD    Department, Room/Bed   [address], [facility]/       Discharge Date       Discharge Disposition       Discharge Destination                                 Attending Provider: Tali Mancera MD    Allergies: No Known Allergies    Isolation: None   Infection: None   Code Status: Not on file    Ht: 182.9 cm (72\")   Wt: 136 kg (299 lb 12.8 oz)    Admission Cmt: None   Principal Problem: Atrial fibrillation with rapid ventricular response [I48.91]                   Active Insurance as of 2024       Primary Coverage       Payor Plan Insurance Group Employer/Plan Group    [facility] DEPT [id number]       " Payor Plan Address Payor Plan Phone Number Payor Plan Fax Number Effective Dates    [facility] OFFICE OF [facility] CARE [phone]  5/2/2024 - None Entered    [address] [address]       [facility] [address]         Subscriber Name Subscriber Birth Date Member ID       CAITLIN MCKENNA JR 1960 [id number]                      Atrial Fibrillation RRG Observation Care       Indications Met   Last updated by Estela Batista RN on 6/18/2024 [id number]     Review Status Created By   Primary Completed Estela Batista RN      Criteria Review   Atrial Fibrillation RRG Observation Care     Overall Determination: Indications Met     Criteria:  [×] Observation care is indicated for  1 or more  of the following :      [×] Pharmacologic rate control needed (eg, symptomatic Tachycardia)          6/18/2024  6:09 PM              -- 6/18/2024  6:09 PM by Estela Batista, GIRISH --                                    (X) Tachycardia, [A] as indicated by  1 or more  of the following  (1) (2):                  (X) Heart rate greater than 100 beats per minute in adult or child age 6 years or older [A]          6/18/2024  6:09 PM              -- 6/18/2024  6:09 PM by Estela Batista, GIRISH --                  HR 140s- Afib.  On Cardizem IV for rate control     Notes:  -- 6/18/2024  6:09 PM by Estela Batista, RN --      To ED c/o heart palpitations & weakness. No CP.       [facility]                    PMHx: A fib, COPD, HTN, Polycythemia. ETOH daily. + smoker.                   ED results:                   EKG: Afib 148.       BNP 2600.0      Creat 1.38      wBC 12.68                  CXR: NAD      UA: Trace ketones, trace leukocytes, NO bacteria.                   IN ED:                   Cardizem IVP & IV drip- titrating.       IV NS 2000ml bolus.       Eliquis PO.                   HR decreased from 147 to 116 in ED.                   Admit:       Telemetry.      Cardiac diet      Cardiology consult      O2      Cardizem IV drip.                    Additional orders pending.                     History & Physical        Tali Mancera MD at 24 1558           [facility]  [facility]: [facility]   Patient Name: Sukhdeep Valladares  : 1960  MRN: [id number]  Primary Care Physician:  Whitney Alvarez PA-C  Date of admission: 2024    Subjective  Subjective     Chief Complaint: Palpitation and generalized weakness    HPI:    Sukhdeep Valladares is a 63 y.o. male with recently diagnosed A-fib on Eliquis, hypertension, hyperlipidemia who presented to ER today due to complaints of palpitation and generalized weakness.  Patient was recently admitted few weeks back at [facility] where he was found to have A-fib with RVR for which she was started on Eliquis and metoprolol.  He was then discharged home.  He has been compliant with medication but is having some stress at home related to work and family situation.  He was not feeling like himself last evening and was lightheaded.  His wife also noticed that he looked pale.  Today morning, he checked his heart rate and was found to be in 150s after which he presented to ER for further evaluation and management.  Denied any fever, chills, rigors, chest pain or difficulty breathing.  Denied any sick contact.    On presentation to the ER, patient was hypotensive with blood pressure 80/68 and tachycardic in 140s.  He was found to be in A-fib with RVR.  Lab work showed creatinine of 1.38 and bicarb of 21.6.  LFT nonsignificant.  Lactic acid 1.5 lipase 28.  WBC 12.68.  BNP of 2600.  UA not significant for UTI.  Chest x-ray showed no acute cardiopulmonary abnormality.  Patient was started on Cardizem drip.  Cardiology was consulted by ED physician and patient is being admitted to medicine service for further evaluation management of A-fib with RVR.          Personal History     Past Medical History:  Past Medical History:   Diagnosis Date   • Essential (primary)  hypertension    • Hypertension    • Mixed hyperlipidemia    • Pulmonary nodule    • Secondary polycythemia        Past Surgical History:  History reviewed. No pertinent surgical history.    Family History:       Social History:   Social History     Socioeconomic History   • Marital status:    Tobacco Use   • Smoking status: Every Day     Current packs/day: 2.00     Types: Cigarettes     Passive exposure: Never   • Smokeless tobacco: Never   Vaping Use   • Vaping status: Never Used   Substance and Sexual Activity   • Alcohol use: Yes     Alcohol/week: 3.0 standard drinks of alcohol     Types: 3 Standard drinks or equivalent per week   • Drug use: Not Currently     Types: Cocaine(coke)   • Sexual activity: Defer       Home Medications:  Vitamin D (Cholecalciferol), albuterol, atorvastatin, benzonatate, budesonide, ipratropium-albuterol, irbesartan, metoprolol succinate XL, predniSONE, and tamsulosin    Allergies:  No Known Allergies    Review of Systems   All systems were reviewed and negative except for: palpitations    Objective  Objective     Vitals:   Temp:  [99.1 °F (37.3 °C)] 99.1 °F (37.3 °C)  Heart Rate:  [138-147] 139  Resp:  [12-15] 15  BP: ()/() 93/71    Physical Exam    Constitutional: Awake, alert, no acute distress   Eyes: Pupils equal, sclerae anicteric, no conjunctival injection   HENT: NCAT, mucous membranes moist   Neck: Supple, no thyromegaly, no lymphadenopathy, trachea midline   Respiratory: Clear to auscultation bilaterally, nonlabored respirations    Cardiovascular: RRR, no murmurs, rubs, or gallops, palpable pedal pulses bilaterally   Gastrointestinal: Positive bowel sounds, soft, nontender, nondistended   Musculoskeletal: No bilateral ankle edema, no clubbing or cyanosis to extremities   Psychiatric: Appropriate affect, cooperative   Neurologic: Oriented x 3, strength symmetric in all extremities, Cranial Nerves grossly intact to confrontation, speech clear   Skin: No rashes      Result Review   Result Review:  I have personally reviewed the results from the time of this admission to 6/18/2024 15:58 EDT and agree with these findings:  []  Laboratory  []  Microbiology  []  Radiology  []  EKG/Telemetry   []  Cardiology/Vascular   []  Pathology  []  Old records  []  Other:      Assessment & Plan  Assessment / Plan     Assessment/Plan:   Atrial fibrillation with RVR  Leukocytosis  Elevated proBNP  JASPAL  Recently diagnosed A-fib on Eliquis  History of hypotension on midodrine  History of hypertension  History of hyperlipidemia    -Patient is being admitted to medicine service under observation.  -Presented with A-fib with RVR.  -Started on Cardizem drip.  Currently HR stable.  -No signs of infection.  Denied any other symptoms apart from palpitation and generalized weakness.  -Leukocytosis, could be reactive.  Chest x-ray and UA nonsignificant.  Will continue to trend and monitor.  -Lactic acid normal.  -Mild JASPAL with creatinine 1.38.  Will continue to trend and monitor.  -Elevated proBNP of 2600.  Could be in the setting of tachycardia.  Will obtain transthoracic echo.  -Cardiology consulted by ED physician, appreciate input.  -Initially plan was to cardiovert patient in ER but given patient's heart rate was stable, cardioversion was not conducted.  -Will hold home dose of tamsulosin and Toprol.  -Continue home dose of Eliquis 5 mg 2 times daily.  -Plan continue Lipitor 40 mg daily.  -Will obtain labs in AM.  -Today to monitor on cardiac telemetry.    VTE Prophylaxis:  No VTE prophylaxis order currently exists.        CODE STATUS:         Admission Status:  I believe this patient meets observation status.    Electronically signed by Tali Mancera MD, 06/18/24, 3:58 PM EDT.               Electronically signed by Tali Mancera MD at 06/18/24 1912       Vital Signs (last day)       Date/Time Temp Temp src Pulse Resp BP Patient Position SpO2    06/18/24 1816 98.1 (36.7) Oral 76 20 105/80  Lying 97    06/18/24 1646 -- -- 109 -- -- -- --    06/18/24 1643 -- -- 116 -- -- -- 98    06/18/24 1640 -- -- 111 -- 97/68 -- 97    06/18/24 1636 -- -- 88 -- -- -- --    06/18/24 1635 -- -- 105 -- 105/72 -- 97    06/18/24 1630 -- -- 94 -- 101/75 -- 97    06/18/24 1615 -- -- 111 -- 99/76 -- --    06/18/24 1515 -- -- 139 -- 93/71 -- 98    06/18/24 1445 -- -- 139 15 113/90 -- 98    06/18/24 1440 -- -- 140 -- 122/102 -- --    06/18/24 1435 -- -- 140 -- 133/92 -- 98    06/18/24 1425 -- -- 139 -- 103/90 -- 98    06/18/24 1424 -- -- 138 -- 116/84 -- 98    06/18/24 1415 -- -- 139 -- 99/76 -- 98    06/18/24 1409 -- -- 140 -- 112/83 -- --    06/18/24 1400 -- -- 141 15 109/74 -- 96    06/18/24 1352 -- -- 141 -- 95/61 -- --    06/18/24 1340 -- -- 144 -- 120/96 -- 96    06/18/24 1330 -- -- 143 -- 104/73 -- 99    06/18/24 1320 -- -- 143 -- 103/70 -- 96    06/18/24 13:19:36 -- -- -- -- 86/62 Lying --    06/18/24 1300 -- -- 143 -- 103/71 -- 96    06/18/24 1250 -- -- 144 -- 94/72 -- 95    06/18/24 1245 -- -- 144 -- 89/75 -- 95    06/18/24 1240 -- -- 143 14 91/73 -- 95    06/18/24 1235 -- -- 145 -- 80/68 -- 94    06/18/24 1221 -- -- 147 -- 105/74 -- --    06/18/24 1205 99.1 (37.3) Oral 147 12 97/80 Lying 96          Current Facility-Administered Medications   Medication Dose Route Frequency Provider Last Rate Last Admin   • [START ON 6/19/2024] apixaban (ELIQUIS) tablet 5 mg  5 mg Oral BID Tali Mancera MD       • [START ON 6/19/2024] atorvastatin (LIPITOR) tablet 40 mg  40 mg Oral Nightly Tali Mancera MD       • sennosides-docusate (PERICOLACE) 8.6-50 MG per tablet 2 tablet  2 tablet Oral BID PRN Tali Mancera MD        And   • polyethylene glycol (MIRALAX) packet 17 g  17 g Oral Daily PRN Tali Mancera MD        And   • bisacodyl (DULCOLAX) EC tablet 5 mg  5 mg Oral Daily PRN Tali Mancera MD        And   • bisacodyl (DULCOLAX) suppository 10 mg  10 mg Rectal Daily PRN Tali Mancera MD       • buPROPion  (WELLBUTRIN) tablet 150 mg  150 mg Oral BID Tali Mancera MD       • dilTIAZem (CARDIZEM) 125 mg in 125 mL sodium chloride  infusion  5-15 mg/hr Intravenous Titrated Ramon Altman,  10 mL/hr at 06/18/24 1934 10 mg/hr at 06/18/24 1934   • midodrine (PROAMATINE) tablet 2.5 mg  2.5 mg Oral BID AC Tali Mancera MD       • sodium chloride 0.9 % flush 10 mL  10 mL Intravenous PRN Ramon Altman DO       • sodium chloride 0.9 % flush 10 mL  10 mL Intravenous Q12H Tali Mancera MD       • sodium chloride 0.9 % flush 10 mL  10 mL Intravenous PRN Tali Mancera MD       • sodium chloride 0.9 % infusion 40 mL  40 mL Intravenous PRN Tali Mancera MD       • [START ON 6/19/2024] tiotropium (SPIRIVA RESPIMAT) 2.5 mcg/act aerosol solution inhaler  2 puff Inhalation Daily - RT Tali Mancera MD         Lab Results (last 24 hours)       Procedure Component Value Units Date/Time    Thyroid Panel With TSH [[id number]] Collected: 06/18/24 1431    Specimen: Blood from Arm, Left Updated: 06/18/24 1914    Urinalysis With Culture If Indicated - Urine, Clean Catch [[id number]]  (Abnormal) Collected: 06/18/24 1601    Specimen: Urine, Clean Catch Updated: 06/18/24 1622     Color, UA Dark Yellow     Appearance, UA Clear     pH, UA 6.0     Specific Gravity, UA 1.023     Glucose, UA Negative     Ketones, UA Trace     Bilirubin, UA Negative     Blood, UA Negative     Protein, UA Trace     Leuk Esterase, UA Trace     Nitrite, UA Negative     Urobilinogen, UA 1.0 E.U./dL    Narrative:      In absence of clinical symptoms, the presence of pyuria, bacteria, and/or nitrites on the urinalysis result does not correlate with infection.    Urinalysis, Microscopic Only - Urine, Clean Catch [[id number]]  (Abnormal) Collected: 06/18/24 1601    Specimen: Urine, Clean Catch Updated: 06/18/24 1622     RBC, UA 3-5 /HPF      WBC, UA 6-10 /HPF      Bacteria, UA None Seen /HPF      Squamous Epithelial Cells, UA 0-2 /HPF      Hyaline Casts, UA  7-12 /LPF      Methodology Automated Microscopy    Urine Culture - Urine, Urine, Clean Catch [[id number]] Collected: 06/18/24 1601    Specimen: Urine, Clean Catch Updated: 06/18/24 1619    High Sensitivity Troponin T 2Hr [[id number]]  (Normal) Collected: 06/18/24 1431    Specimen: Blood from Arm, Left Updated: 06/18/24 1502     HS Troponin T 11 ng/L      Troponin T Delta -2 ng/L     Narrative:      High Sensitive Troponin T Reference Range:  <14.0 ng/L- Negative Female for AMI  <22.0 ng/L- Negative Male for AMI  >=14 - Abnormal Female indicating possible myocardial injury.  >=22 - Abnormal Male indicating possible myocardial injury.   Clinicians would have to utilize clinical acumen, EKG, Troponin, and serial changes to determine if it is an Acute Myocardial Infarction or myocardial injury due to an underlying chronic condition.         Lactic Acid, Plasma [[id number]]  (Normal) Collected: 06/18/24 1256    Specimen: Blood from Arm, Right Updated: 06/18/24 1326     Lactate 1.5 mmol/L     High Sensitivity Troponin T [[id number]]  (Normal) Collected: 06/18/24 1213    Specimen: Blood Updated: 06/18/24 1315     HS Troponin T 13 ng/L     Narrative:      High Sensitive Troponin T Reference Range:  <14.0 ng/L- Negative Female for AMI  <22.0 ng/L- Negative Male for AMI  >=14 - Abnormal Female indicating possible myocardial injury.  >=22 - Abnormal Male indicating possible myocardial injury.   Clinicians would have to utilize clinical acumen, EKG, Troponin, and serial changes to determine if it is an Acute Myocardial Infarction or myocardial injury due to an underlying chronic condition.         Comprehensive Metabolic Panel [[id number]]  (Abnormal) Collected: 06/18/24 1213    Specimen: Blood Updated: 06/18/24 1315     Glucose 107 mg/dL      BUN 16 mg/dL      Creatinine 1.38 mg/dL      Sodium 138 mmol/L      Potassium 4.0 mmol/L      Chloride 103 mmol/L      CO2 21.6 mmol/L      Calcium 9.3 mg/dL      Total Protein 7.0 g/dL       Albumin 4.2 g/dL      ALT (SGPT) 18 U/L      AST (SGOT) 15 U/L      Alkaline Phosphatase 109 U/L      Total Bilirubin 0.8 mg/dL      Globulin 2.8 gm/dL      A/G Ratio 1.5 g/dL      BUN/Creatinine Ratio 11.6     Anion Gap 13.4 mmol/L      eGFR 58.2 mL/min/1.73     Narrative:      GFR Normal >60  Chronic Kidney Disease <60  Kidney Failure <15      Lipase [[id number]]  (Normal) Collected: 06/18/24 1213    Specimen: Blood Updated: 06/18/24 1315     Lipase 28 U/L     Magnesium [[id number]]  (Normal) Collected: 06/18/24 1213    Specimen: Blood Updated: 06/18/24 1315     Magnesium 2.0 mg/dL     BNP [[id number]]  (Abnormal) Collected: 06/18/24 1213    Specimen: Blood Updated: 06/18/24 1311     proBNP 2,600.0 pg/mL     Narrative:      This assay is used as an aid in the diagnosis of individuals suspected of having heart failure. It can be used as an aid in the diagnosis of acute decompensated heart failure (ADHF) in patients presenting with signs and symptoms of ADHF to the emergency department (ED). In addition, NT-proBNP of <300 pg/mL indicates ADHF is not likely.    Age Range Result Interpretation  NT-proBNP Concentration (pg/mL:      <50             Positive            >450                   Gray                 300-450                    Negative             <300    50-75           Positive            >900                  Gray                300-900                  Negative            <300      >75             Positive            >1800                  Gray                300-1800                  Negative            <300    CBC & Differential [[id number]]  (Abnormal) Collected: 06/18/24 1213    Specimen: Blood Updated: 06/18/24 1253    Narrative:      The following orders were created for panel order CBC & Differential.  Procedure                               Abnormality         Status                     ---------                               -----------         ------                     CBC Auto  Differential[[id number]]        Abnormal            Final result                 Please view results for these tests on the individual orders.    CBC Auto Differential [[id number]]  (Abnormal) Collected: 06/18/24 1213    Specimen: Blood Updated: 06/18/24 1253     WBC 12.68 10*3/mm3      RBC 5.36 10*6/mm3      Hemoglobin 16.4 g/dL      Hematocrit 47.8 %      MCV 89.2 fL      MCH 30.6 pg      MCHC 34.3 g/dL      RDW 14.5 %      RDW-SD 46.6 fl      MPV 9.6 fL      Platelets 298 10*3/mm3      Neutrophil % 66.3 %      Lymphocyte % 24.3 %      Monocyte % 6.4 %      Eosinophil % 1.9 %      Basophil % 0.8 %      Immature Grans % 0.3 %      Neutrophils, Absolute 8.41 10*3/mm3      Lymphocytes, Absolute 3.08 10*3/mm3      Monocytes, Absolute 0.81 10*3/mm3      Eosinophils, Absolute 0.24 10*3/mm3      Basophils, Absolute 0.10 10*3/mm3      Immature Grans, Absolute 0.04 10*3/mm3      nRBC 0.0 /100 WBC     [place] Draw [[id number]] Collected: 06/18/24 1213    Specimen: Blood Updated: 06/18/24 1245    Narrative:      The following orders were created for panel order [place] Draw.  Procedure                               Abnormality         Status                     ---------                               -----------         ------                     Green Top (Gel)[[id number]]                                  Final result               Lavender Top[[id number]]                                     Final result               Gold Top - SST[[id number]]                                   Final result               Light Blue Top[[id number]]                                   Final result                 Please view results for these tests on the individual orders.    Green Top (Gel) [[id number]] Collected: 06/18/24 1213    Specimen: Blood Updated: 06/18/24 1245     Extra Tube Hold for add-ons.     Comment: Auto resulted.       Lavender Top [[id number]] Collected: 06/18/24 1213    Specimen: Blood Updated: 06/18/24 1245     Extra Tube hold for  add-on     Comment: Auto resulted       Gold Top - SST [[id number]] Collected: 06/18/24 1213    Specimen: Blood Updated: 06/18/24 1245     Extra Tube Hold for add-ons.     Comment: Auto resulted.       Light Blue Top [[id number]] Collected: 06/18/24 1213    Specimen: Blood Updated: 06/18/24 1245     Extra Tube Hold for add-ons.     Comment: Auto resulted       Blood Culture - Blood, Arm, Left [[id number]] Collected: 06/18/24 1219    Specimen: Blood from Arm, Left Updated: 06/18/24 1243    Blood Culture - Blood, Arm, Right [[id number]] Collected: 06/18/24 1219    Specimen: Blood from Arm, Right Updated: 06/18/24 1243          Imaging Results (Last 24 Hours)       Procedure Component Value Units Date/Time    XR Chest 1 View [[id number]] Collected: 06/18/24 1330     Updated: 06/18/24 1333    Narrative:      XR CHEST 1 VW    Date of Exam: 6/18/2024 1:05 PM EDT    Indication: Chest Pain Triage Protocol    Comparison: 1/3/2023 radiographs    Findings:  Unchanged cardiomediastinal silhouette. No focal airspace consolidation. No pleural effusion or pneumothorax. No acute osseous abnormality.      Impression:      Impression:  No acute cardiopulmonary abnormality.      Electronically Signed: Trever Finney MD    6/18/2024 1:31 PM EDT    Workstation ID: [id number]          ECG/EMG Results (last 24 hours)       Procedure Component Value Units Date/Time    ECG 12 Lead ED Triage Standing Order; Chest Pain [[id number]] Collected: 06/18/24 1211     Updated: 06/18/24 1212     QT Interval 290 ms      QTC Interval 456 ms     Narrative:      HEART RATE= 148  bpm  RR Interval= 404  ms  W[place] Interval= 116  ms  P Horizontal Axis= -9  deg  P Front Axis= 92  deg  QRSD Interval= 110  ms  QT Interval= 290  ms  QTcB= 456  ms  QRS Axis= -39  deg  T Wave Axis= 14  deg  - ABNORMAL ECG -  Sinus tachycardia  Incomplete RBBB and LAFB  RSR' in V1 or V2, right VCD or RVH  ST elevation, consider inferior injury  Electronically Signed By:   Date and Time of Study:  2024-06-18 12:11:49    ECG 12 Lead ED Triage Standing Order; Chest Pain [[id number]] Collected: 06/18/24 1425     Updated: 06/18/24 1426     QT Interval 356 ms      QTC Interval 542 ms     Narrative:      HEART RATE= 139  bpm  RR Interval= 432  ms  UT Interval= 122  ms  P Horizontal Axis=   deg  P Front Axis= 0  deg  QRSD Interval= 105  ms  QT Interval= 356  ms  QTcB= 542  ms  QRS Axis= -37  deg  T Wave Axis= 0  deg  - ABNORMAL ECG -  Sinus tachycardia  Left axis deviation  Borderline low voltage, extremity leads  RSR' in V1 or V2, probably normal variant  Prolonged QT interval  Electronically Signed By:   Date and Time of Study: 2024-06-18 14:25:26    ECG 12 Lead Rhythm Change [[id number]] Collected: 06/18/24 1612     Updated: 06/18/24 1613     QT Interval 503 ms      QTC Interval 597 ms     Narrative:      HEART RATE= 84  bpm  RR Interval= 711  ms  UT Interval=   ms  P Horizontal Axis=   deg  P Front Axis=   deg  QRSD Interval= 111  ms  QT Interval= 503  ms  QTcB= 597  ms  QRS Axis= -10  deg  T Wave Axis= 12  deg  - ABNORMAL ECG -  Atrial flutter  RSR' in V1 or V2, right VCD or RVH  Inferior infarct, age indeterminate  Nonspecific T abnormalities, lateral leads  Prolonged QT interval  Electronically Signed By:   Date and Time of Study: 2024-06-18 16:12:32          Orders (last 24 hrs)        Start     Ordered    06/19/24 2100  atorvastatin (LIPITOR) tablet 40 mg  Nightly         06/18/24 1914 06/19/24 0930  tiotropium (SPIRIVA RESPIMAT) 2.5 mcg/act aerosol solution inhaler  Daily - RT         06/18/24 1914 06/19/24 0900  apixaban (ELIQUIS) tablet 5 mg  2 Times Daily         06/18/24 1914 06/19/24 0600  Basic Metabolic Panel  Daily       06/18/24 1912 06/19/24 0600  CBC & Differential  Daily       06/18/24 1912 06/19/24 0600  Magnesium  Daily       06/18/24 1912 06/19/24 0600  Phosphorus  Daily       06/18/24 1912 06/19/24 0600  Calcium  Daily       06/18/24 1912 06/18/24 2100  sodium chloride  "0.9 % flush 10 mL  Every 12 Hours Scheduled         06/18/24 1605    06/18/24 2100  apixaban (ELIQUIS) tablet 5 mg  2 Times Daily,   Status:  Discontinued         06/18/24 1914 06/18/24 2100  buPROPion (WELLBUTRIN) tablet 150 mg  2 Times Daily         06/18/24 1914 06/18/24 2000  Vital Signs  Every 4 Hours       06/18/24 1605    06/18/24 2000  midodrine (PROAMATINE) tablet 2.5 mg  2 Times Daily Before Meals         06/18/24 1914 06/18/24 1913  Thyroid Panel With TSH  Once         06/18/24 1912 06/18/24 1913  Adult Transthoracic Echo Complete W/ Cont if Necessary Per Protocol  Once         06/18/24 1912 06/18/24 1801  Initiate Observation Status  Once         06/18/24 1800    06/18/24 1800  Oral Care  2 Times Daily       06/18/24 1605    06/18/24 1702  Diet: Cardiac; Healthy Heart (2-3 Na+); Fluid Consistency: Thin (IDDSI 0)  Diet Effective Now         06/18/24 1701    06/18/24 1630  etomidate (AMIDATE) injection 10 mg  Once,   Status:  Discontinued         06/18/24 1601    06/18/24 1630  apixaban (ELIQUIS) tablet 5 mg  Once         06/18/24 1608    06/18/24 1620  Urinalysis, Microscopic Only - Urine, Clean Catch  Once         06/18/24 1619    06/18/24 1620  Urine Culture - Urine, Urine, Clean Catch  Once         06/18/24 1619    06/18/24 1612  ECG 12 Lead Rhythm Change  Once         06/18/24 1611    06/18/24 1606  Inpatient Admission  Once         06/18/24 1605    06/18/24 1606  Inpatient Cardiology Consult  Once        Specialty:  Cardiology  Provider:  Patty Lewis MD    06/18/24 1606    06/18/24 1605  sennosides-docusate (PERICOLACE) 8.6-50 MG per tablet 2 tablet  2 Times Daily PRN        Placed in \"And\" Linked Group    06/18/24 1605    06/18/24 1605  polyethylene glycol (MIRALAX) packet 17 g  Daily PRN        Placed in \"And\" Linked Group    06/18/24 1605    06/18/24 1605  bisacodyl (DULCOLAX) EC tablet 5 mg  Daily PRN        Placed in \"And\" Linked Group    06/18/24 1605    06/18/24 1605  " "bisacodyl (DULCOLAX) suppository 10 mg  Daily PRN        Placed in \"And\" Linked Group    06/18/24 1605    06/18/24 1605  Intake & Output  Every Shift       06/18/24 1605    06/18/24 1605  Weigh Patient  Once         06/18/24 1605    06/18/24 1605  Insert Peripheral IV  Once         06/18/24 1605    06/18/24 1605  Saline Lock & Maintain IV Access  Continuous         06/18/24 1605    06/18/24 1604  sodium chloride 0.9 % flush 10 mL  As Needed         06/18/24 1605    06/18/24 1604  sodium chloride 0.9 % infusion 40 mL  As Needed         06/18/24 1605    06/18/24 1550  Cardiology (on-call MD unless specified)  Once        Specialty:  Cardiology  Provider:  Patty Lewis MD    06/18/24 1549    06/18/24 1550  Hospitalist (on-call MD unless specified)  Once        Specialty:  Hospitalist  Provider:  Tali Mancera MD    06/18/24 1549    06/18/24 1415  sodium chloride 0.9 % bolus 1,000 mL  Once         06/18/24 1349    06/18/24 1413  High Sensitivity Troponin T 2Hr  PROCEDURE ONCE         06/18/24 1315    06/18/24 1330  sodium chloride 0.9 % bolus 500 mL  Once         06/18/24 1310    06/18/24 1300  sodium chloride 0.9 % bolus 500 mL  Once         06/18/24 1232    06/18/24 1300  dilTIAZem (CARDIZEM) 125 mg in 125 mL sodium chloride  infusion  Titrated         06/18/24 1239    06/18/24 1245  Lactic Acid, Plasma  Once         06/18/24 1214    06/18/24 1245  dilTIAZem (CARDIZEM) injection 20 mg  Once         06/18/24 1215    06/18/24 1230  aspirin chewable tablet 324 mg  Once,   Status:  Discontinued         06/18/24 1207    06/18/24 1215  Blood Culture - Blood, Arm, Right  Once         06/18/24 1214    06/18/24 1215  Blood Culture - Blood, Arm, Left  Once         06/18/24 1214    06/18/24 1215  Urinalysis With Culture If Indicated - Urine, Clean Catch  Once         06/18/24 1214    06/18/24 1208  NPO Diet NPO Type: Strict NPO  Diet Effective Now,   Status:  Canceled         06/18/24 1207    06/18/24 1208  Undress & " Gown  Once         06/18/24 1207    06/18/24 1208  Cardiac Monitoring  Continuous        Comments: Follow Standing Orders As Outlined in Process Instructions (Open Order Report to View Full Instructions)    06/18/24 1207    06/18/24 1208  Continuous Pulse Oximetry  Continuous         06/18/24 1207    06/18/24 1208  ECG 12 Lead ED Triage Standing Order; Chest Pain  Now & in 2 Hours       06/18/24 1207    06/18/24 1208  XR Chest 1 View  1 Time Imaging         06/18/24 1207    06/18/24 1208  Insert Peripheral IV  Once         06/18/24 1207    06/18/24 1208  [place] Draw  Once         06/18/24 1207    06/18/24 1208  High Sensitivity Troponin T  Once         06/18/24 1207    06/18/24 1208  CBC & Differential  Once         06/18/24 1207    06/18/24 1208  Comprehensive Metabolic Panel  Once         06/18/24 1207    06/18/24 1208  Lipase  Once         06/18/24 1207    06/18/24 1208  BNP  Once         06/18/24 1207    06/18/24 1208  Magnesium  Once         06/18/24 1207    06/18/24 1208  Green Top (Gel)  PROCEDURE ONCE         06/18/24 1207    06/18/24 1208  Lavender Top  PROCEDURE ONCE         06/18/24 1207    06/18/24 1208  Gold Top - SST  PROCEDURE ONCE         06/18/24 1207    06/18/24 1208  Light Blue Top  PROCEDURE ONCE         06/18/24 1207    06/18/24 1208  CBC Auto Differential  PROCEDURE ONCE         06/18/24 1207    06/18/24 1207  sodium chloride 0.9 % flush 10 mL  As Needed         06/18/24 1207    Unscheduled  Oxygen Therapy- Nasal Cannula; Titrate 1-6 LPM Per SpO2; 90 - 95%  Continuous PRN       06/18/24 1207    Unscheduled  ECG 12 Lead ED Triage Standing Order; Chest Pain  As Needed      Comments: Persistent, Unrelieved or Recurrent Chest Pain    06/18/24 1207    Pending  ECG 12 Lead Rhythm Change  Once         Pending    --  metoprolol succinate XL (TOPROL-XL) 50 MG 24 hr tablet  Daily         06/18/24 1632    --  sildenafil (VIAGRA) 100 MG tablet  Daily PRN         06/18/24 1634    --  tiotropium (SPIRIVA)  18 MCG per inhalation capsule  Daily - RT         06/18/24 1634    --  apixaban (ELIQUIS) 5 MG tablet tablet  2 Times Daily         06/18/24 1634    --  buPROPion (WELLBUTRIN) 75 MG tablet  2 Times Daily         06/18/24 1634    --  vitamin D (ERGOCALCIFEROL) 1.25 MG (71307 UT) capsule capsule  Weekly         06/18/24 1634    --  midodrine (PROAMATINE) 2.5 MG tablet  2 Times Daily         06/18/24 1634

## 2024-06-18 NOTE — H&P
[facility] HISTORY AND PHYSICAL  Date: 2024   Patient Name: Sukhdeep Valladares  : 1960  MRN: [id number]  Primary Care Physician:  Whitney Alvarez PA-C  Date of admission: 2024    Subjective   Subjective     Chief Complaint: Palpitation and generalized weakness    HPI:    Sukhdeep Valladares is a 63 y.o. male with recently diagnosed A-fib on Eliquis, hypertension, hyperlipidemia who presented to ER today due to complaints of palpitation and generalized weakness.  Patient was recently admitted few weeks back at [facility] where he was found to have A-fib with RVR for which she was started on Eliquis and metoprolol.  He was then discharged home.  He has been compliant with medication but is having some stress at home related to work and family situation.  He was not feeling like himself last evening and was lightheaded.  His wife also noticed that he looked pale.  Today morning, he checked his heart rate and was found to be in 150s after which he presented to ER for further evaluation and management.  Denied any fever, chills, rigors, chest pain or difficulty breathing.  Denied any sick contact.    On presentation to the ER, patient was hypotensive with blood pressure 80/68 and tachycardic in 140s.  He was found to be in A-fib with RVR.  Lab work showed creatinine of 1.38 and bicarb of 21.6.  LFT nonsignificant.  Lactic acid 1.5 lipase 28.  WBC 12.68.  BNP of 2600.  UA not significant for UTI.  Chest x-ray showed no acute cardiopulmonary abnormality.  Patient was started on Cardizem drip.  Cardiology was consulted by ED physician and patient is being admitted to medicine service for further evaluation management of A-fib with RVR.          Personal History     Past Medical History:  Past Medical History:   Diagnosis Date   • Essential (primary) hypertension    • Hypertension    • Mixed hyperlipidemia    • Pulmonary nodule    • Secondary polycythemia        Past Surgical  History:  History reviewed. No pertinent surgical history.    Family History:       Social History:   Social History     Socioeconomic History   • Marital status:    Tobacco Use   • Smoking status: Every Day     Current packs/day: 2.00     Types: Cigarettes     Passive exposure: Never   • Smokeless tobacco: Never   Vaping Use   • Vaping status: Never Used   Substance and Sexual Activity   • Alcohol use: Yes     Alcohol/week: 3.0 standard drinks of alcohol     Types: 3 Standard drinks or equivalent per week   • Drug use: Not Currently     Types: Cocaine(coke)   • Sexual activity: Defer       Home Medications:  Vitamin D (Cholecalciferol), albuterol, atorvastatin, benzonatate, budesonide, ipratropium-albuterol, irbesartan, metoprolol succinate XL, predniSONE, and tamsulosin    Allergies:  No Known Allergies    Review of Systems   All systems were reviewed and negative except for: palpitations    Objective   Objective     Vitals:   Temp:  [99.1 °F (37.3 °C)] 99.1 °F (37.3 °C)  Heart Rate:  [138-147] 139  Resp:  [12-15] 15  BP: ()/() 93/71    Physical Exam    Constitutional: Awake, alert, no acute distress   Eyes: Pupils equal, sclerae anicteric, no conjunctival injection   HENT: NCAT, mucous membranes moist   Neck: Supple, no thyromegaly, no lymphadenopathy, trachea midline   Respiratory: Clear to auscultation bilaterally, nonlabored respirations    Cardiovascular: tachycardic, irregular rhythm, no murmurs, rubs, or gallops, palpable pedal pulses bilaterally   Gastrointestinal: Positive bowel sounds, soft, nontender, nondistended   Musculoskeletal: No bilateral ankle edema, no clubbing or cyanosis to extremities   Psychiatric: Appropriate affect, cooperative   Neurologic: Oriented x 3, strength symmetric in all extremities, Cranial Nerves grossly intact to confrontation, speech clear   Skin: No rashes     Result Review    Result Review:  I have personally reviewed the results from the time of this  admission to 6/18/2024 15:58 EDT and agree with these findings:  []  Laboratory  []  Microbiology  []  Radiology  []  EKG/Telemetry   []  Cardiology/Vascular   []  Pathology  []  Old records  []  Other:      Assessment & Plan   Assessment / Plan     Assessment/Plan:   Atrial fibrillation with RVR  Leukocytosis  Elevated proBNP  JASPAL  Recently diagnosed A-fib on Eliquis  History of hypotension on midodrine  History of hypertension  History of hyperlipidemia    -Patient is being admitted to medicine service under observation.  -Presented with A-fib with RVR.  -Started on Cardizem drip.  Currently HR stable.  -No signs of infection.  Denied any other symptoms apart from palpitation and generalized weakness.  -Leukocytosis, could be reactive.  Chest x-ray and UA nonsignificant.  Will continue to trend and monitor.  -Lactic acid normal.  -Mild JASPAL with creatinine 1.38.  Will continue to trend and monitor.  -Elevated proBNP of 2600.  Could be in the setting of tachycardia.  Will obtain transthoracic echo.  -Cardiology consulted by ED physician, appreciate input.  -Initially plan was to cardiovert patient in ER but given patient's heart rate was stable, cardioversion was not conducted.  -Will hold home dose of tamsulosin and Toprol.  -Continue home dose of Eliquis 5 mg 2 times daily.  -Plan continue Lipitor 40 mg daily.  -Will obtain labs in AM.  -Today to monitor on cardiac telemetry.    VTE Prophylaxis:  No VTE prophylaxis order currently exists.        CODE STATUS:         Admission Status:  I believe this patient meets observation status.    Electronically signed by Tali Mancera MD, 06/18/24, 3:58 PM EDT.

## 2024-06-18 NOTE — Clinical Note
Level of Care: Telemetry [5]   Diagnosis: Atrial fibrillation with rapid ventricular response [[id number]]   Admitting Physician: SUJEY JUAN [[id number]]   Attending Physician: SUJEY JUAN [[id number]]   Certification: I Certify That Inpatient Hospital Services Are Medically Necessary For Greater Than 2 Midnights

## 2024-06-18 NOTE — ED PROVIDER NOTES
Time: 12:14 PM EDT  Date of encounter:  6/18/2024  Independent Historian/Clinical History and Information was obtained by:   Patient    History is limited by: N/A    Chief Complaint: Rapid heart rate and weakness      History of Present Illness:  Patient is a 63 y.o. year old male who presents to the emergency department for evaluation of heart rate and weakness.  This patient presents the emergency department [place] palpitations was started during the day today.  He has had some mild weakness and denies any chest pain or significant shortness of breath.  The patient does have a history of hypertension COPD he also smokes and does drink alcohol daily.  [facility].  He states he has not had an issue with his atrial fibrillation since his last admission.    HPI    Patient Care Team  Primary Care Provider: Whitney Alvarez PA-C    Past Medical History:     No Known Allergies  Past Medical History:   Diagnosis Date    Essential (primary) hypertension     Hypertension     Mixed hyperlipidemia     Pulmonary nodule     Secondary polycythemia      History reviewed. No pertinent surgical history.  History reviewed. No pertinent family history.    Home Medications:  Prior to Admission medications    Medication Sig Start Date End Date Taking? Authorizing Provider   albuterol (PROAIR RESPICLICK) 108 (90 Base) MCG/ACT inhaler Inhale 2 puffs Every 4 (Four) Hours As Needed for Wheezing. 10/17/22   Siva Gallegos MD   atorvastatin (LIPITOR) 40 MG tablet Take 1 tablet by mouth Daily. 5/19/23   Siva Gallegos MD   benzonatate (TESSALON) 200 MG capsule Take 1 capsule by mouth 3 (Three) Times a Day As Needed for Cough. 12/28/22   Katie Almonte APRN   budesonide (PULMICORT) 0.5 MG/2ML nebulizer solution USE 2 ML VIA NEBULIZER DAILY FOR 30 DAYS 4/10/23   Siva Gallegos MD   ipratropium-albuterol (DUO-NEB) 0.5-2.5 mg/3 ml nebulizer Take 3 mL  by nebulization Every 4 (Four) Hours As Needed for Wheezing or Shortness of Air. 12/28/22   Katie Almonte APRN   irbesartan (AVAPRO) 75 MG tablet TAKE 1 TABLET BY MOUTH DAILY 4/5/23   Siva Gallegos MD   metoprolol succinate XL (Toprol XL) 25 MG 24 hr tablet Take 1 tablet by mouth Daily. 1/3/23   Siva Gallegos MD   predniSONE (DELTASONE) 20 MG tablet 1 tablet twice a day for 3 days, 1 tablet once a day for 3 days, 1/2 tablet daily for 4 days. 1/6/23   Siva Gallegos MD   tamsulosin (FLOMAX) 0.4 MG capsule 24 hr capsule Take 1 capsule by mouth Daily.    ProviderRuperto MD   Vitamin D, Cholecalciferol, (CHOLECALCIFEROL) 10 MCG (400 UNIT) tablet Take 1 tablet by mouth Daily.    Provider, MD Ruperto        Social History:   Social History     Tobacco Use    Smoking status: Every Day     Current packs/day: 2.00     Types: Cigarettes     Passive exposure: Never    Smokeless tobacco: Never   Vaping Use    Vaping status: Never Used   Substance Use Topics    Alcohol use: Yes     Alcohol/week: 3.0 standard drinks of alcohol     Types: 3 Standard drinks or equivalent per week    Drug use: Not Currently     Types: Cocaine(coke)         Review of Systems:  Review of Systems   Constitutional:  Negative for chills and fever.   HENT:  Negative for congestion, ear pain and sore throat.    Eyes:  Negative for pain.   Respiratory:  Negative for cough, chest tightness and shortness of breath.    Cardiovascular:  Positive for palpitations. Negative for chest pain.   Gastrointestinal:  Negative for abdominal pain, diarrhea, nausea and vomiting.   Genitourinary:  Negative for flank pain and hematuria.   Musculoskeletal:  Negative for joint swelling.   Skin:  Negative for pallor.   Neurological:  Negative for seizures and headaches.   All other systems reviewed and are negative.       Physical Exam:  /80 (BP Location: Left arm, Patient Position: Lying)   Pulse 73   Temp 97.9 °F (36.6 °C) (Oral)   Resp 16   Ht  "182.9 cm (72\")   Wt 136 kg (299 lb 12.8 oz)   SpO2 96%   BMI 40.66 kg/m²     Physical Exam  Vitals and nursing note reviewed.   Constitutional:       General: He is not in acute distress.     Appearance: Normal appearance. He is not toxic-appearing.   HENT:      Head: Normocephalic and atraumatic.      Right Ear: External ear normal.      Left Ear: External ear normal.      Nose: Nose normal.      Mouth/Throat:      Mouth: Mucous membranes are moist.   Eyes:      General: No scleral icterus.     Extraocular Movements: Extraocular movements intact.      Pupils: Pupils are equal, round, and reactive to light.   Cardiovascular:      Rate and Rhythm: Regular rhythm. Tachycardia present.      Pulses: Normal pulses.      Heart sounds: Normal heart sounds.   Pulmonary:      Effort: Pulmonary effort is normal. No respiratory distress.      Breath sounds: Normal breath sounds.   Abdominal:      General: Abdomen is flat.      Palpations: Abdomen is soft.      Tenderness: There is no abdominal tenderness.   Musculoskeletal:         General: Normal range of motion.      Cervical back: Normal range of motion and neck supple.   Skin:     General: Skin is warm and dry.   Neurological:      Mental Status: He is alert and oriented to person, place, and time. Mental status is at baseline.                  Procedures:  Procedures      Medical Decision Making:      Comorbidities that affect care:    Atrial Fibrillation    External Notes reviewed:    Previous Clinic Note: Office visit for COPD      The following orders were placed and all results were independently analyzed by me:  Orders Placed This Encounter   Procedures    Blood Culture - Blood,    Blood Culture - Blood,    Urine Culture - Urine,    XR Chest 1 View    [place] Draw    High Sensitivity Troponin T    Comprehensive Metabolic Panel    Lipase    BNP    Magnesium    CBC Auto Differential    Lactic Acid, Plasma    Urinalysis With Culture If Indicated - Urine, Clean Catch "    High Sensitivity Troponin T 2Hr    Urinalysis, Microscopic Only - Urine, Clean Catch    Basic Metabolic Panel    Magnesium    Phosphorus    Calcium    Thyroid Panel With TSH    CBC Auto Differential    Diet: Cardiac; Healthy Heart (2-3 Na+); Fluid Consistency: Thin (IDDSI 0)    Undress & Gown    Continuous Pulse Oximetry    Vital Signs    Intake & Output    Weigh Patient    Oral Care    Saline Lock & Maintain IV Access    Code Status and Medical Interventions:    Cardiology (on-call MD unless specified)    Hospitalist (on-call MD unless specified)    Inpatient Cardiology Consult    Oxygen Therapy- Nasal Cannula; Titrate 1-6 LPM Per SpO2; 90 - 95%    ECG 12 Lead ED Triage Standing Order; Chest Pain    ECG 12 Lead ED Triage Standing Order; Chest Pain    ECG 12 Lead Rhythm Change    Adult Transthoracic Echo Complete W/ Cont if Necessary Per Protocol    Insert Peripheral IV    Insert Peripheral IV    Inpatient Admission    Initiate Observation Status    CBC & Differential    Green Top (Gel)    Lavender Top    Gold Top - SST    Light Blue Top    CBC & Differential       Medications Given in the Emergency Department:  Medications   sodium chloride 0.9 % flush 10 mL (has no administration in time range)   sodium chloride 0.9 % flush 10 mL (10 mL Intravenous Given 6/19/24 0854)   sodium chloride 0.9 % flush 10 mL (has no administration in time range)   sodium chloride 0.9 % infusion 40 mL (has no administration in time range)   sennosides-docusate (PERICOLACE) 8.6-50 MG per tablet 2 tablet (2 tablets Oral Given 6/19/24 0856)     And   polyethylene glycol (MIRALAX) packet 17 g (has no administration in time range)     And   bisacodyl (DULCOLAX) EC tablet 5 mg (has no administration in time range)     And   bisacodyl (DULCOLAX) suppository 10 mg (has no administration in time range)   atorvastatin (LIPITOR) tablet 40 mg (has no administration in time range)   buPROPion (WELLBUTRIN) tablet 150 mg (150 mg Oral Given 6/19/24  0855)   tiotropium (SPIRIVA RESPIMAT) 2.5 mcg/act aerosol solution inhaler (2 puffs Inhalation Given 6/19/24 1037)   apixaban (ELIQUIS) tablet 5 mg (5 mg Oral Given 6/19/24 0855)   metoprolol tartrate (LOPRESSOR) tablet 25 mg (has no administration in time range)   dilTIAZem (CARDIZEM) injection 20 mg (20 mg Intravenous Given 6/18/24 1221)   sodium chloride 0.9 % bolus 500 mL (0 mL Intravenous Stopped 6/18/24 1309)   sodium chloride 0.9 % bolus 500 mL (0 mL Intravenous Stopped 6/18/24 1351)   sodium chloride 0.9 % bolus 1,000 mL (0 mL Intravenous Stopped 6/18/24 1500)   apixaban (ELIQUIS) tablet 5 mg (5 mg Oral Given 6/18/24 1612)   Sulfur Hexafluoride Microsph (LUMASON) 60.7-25 MG IV reconstituted suspension reconstituted suspension 5 mL (5 mL Injection Given 6/18/24 2027)        ED Course:           EKG: atrial fibrillation with a rate of 148 BPM  No acute ischemia    Labs:    Lab Results (last 24 hours)       Procedure Component Value Units Date/Time    High Sensitivity Troponin T 2Hr [[id number]]  (Normal) Collected: 06/18/24 1431    Specimen: Blood from Arm, Left Updated: 06/18/24 1502     HS Troponin T 11 ng/L      Troponin T Delta -2 ng/L     Narrative:      High Sensitive Troponin T Reference Range:  <14.0 ng/L- Negative Female for AMI  <22.0 ng/L- Negative Male for AMI  >=14 - Abnormal Female indicating possible myocardial injury.  >=22 - Abnormal Male indicating possible myocardial injury.   Clinicians would have to utilize clinical acumen, EKG, Troponin, and serial changes to determine if it is an Acute Myocardial Infarction or myocardial injury due to an underlying chronic condition.         Thyroid Panel With TSH [[id number]]  (Normal) Collected: 06/18/24 1431    Specimen: Blood from Arm, Left Updated: 06/18/24 2341     TSH 1.070 uIU/mL      T Uptake 1.00 TBI      T4, Total 7.16 mcg/dL      Comment: T4 results may be falsely increased if patient taking Biotin.       Urinalysis With Culture If Indicated -  Urine, Clean Catch [[id number]]  (Abnormal) Collected: 06/18/24 1601    Specimen: Urine, Clean Catch Updated: 06/18/24 1622     Color, UA Dark Yellow     Appearance, UA Clear     pH, UA 6.0     Specific Gravity, UA 1.023     Glucose, UA Negative     Ketones, UA Trace     Bilirubin, UA Negative     Blood, UA Negative     Protein, UA Trace     Leuk Esterase, UA Trace     Nitrite, UA Negative     Urobilinogen, UA 1.0 E.U./dL    Narrative:      In absence of clinical symptoms, the presence of pyuria, bacteria, and/or nitrites on the urinalysis result does not correlate with infection.    Urinalysis, Microscopic Only - Urine, Clean Catch [[id number]]  (Abnormal) Collected: 06/18/24 1601    Specimen: Urine, Clean Catch Updated: 06/18/24 1622     RBC, UA 3-5 /HPF      WBC, UA 6-10 /HPF      Bacteria, UA None Seen /HPF      Squamous Epithelial Cells, UA 0-2 /HPF      Hyaline Casts, UA 7-12 /LPF      Methodology Automated Microscopy    Urine Culture - Urine, Urine, Clean Catch [[id number]]  (Normal) Collected: 06/18/24 1601    Specimen: Urine, Clean Catch Updated: 06/19/24 1018     Urine Culture No growth    Basic Metabolic Panel [[id number]]  (Abnormal) Collected: 06/19/24 0434    Specimen: Blood from Hand, Left Updated: 06/19/24 0529     Glucose 109 mg/dL      BUN 17 mg/dL      Creatinine 1.29 mg/dL      Sodium 138 mmol/L      Potassium 3.9 mmol/L      Chloride 106 mmol/L      CO2 19.1 mmol/L      Calcium 8.7 mg/dL      BUN/Creatinine Ratio 13.2     Anion Gap 12.9 mmol/L      eGFR 63.1 mL/min/1.73     Narrative:      GFR Normal >60  Chronic Kidney Disease <60  Kidney Failure <15      CBC & Differential [[id number]]  (Normal) Collected: 06/19/24 0434    Specimen: Blood from Hand, Left Updated: 06/19/24 0518    Narrative:      The following orders were created for panel order CBC & Differential.  Procedure                               Abnormality         Status                     ---------                                -----------         ------                     CBC Auto Differential[[id number]]        Normal              Final result                 Please view results for these tests on the individual orders.    Magnesium [[id number]]  (Normal) Collected: 06/19/24 0434    Specimen: Blood from Hand, Left Updated: 06/19/24 0529     Magnesium 2.0 mg/dL     Phosphorus [[id number]]  (Normal) Collected: 06/19/24 0434    Specimen: Blood from Hand, Left Updated: 06/19/24 0529     Phosphorus 3.3 mg/dL     CBC Auto Differential [[id number]]  (Normal) Collected: 06/19/24 0434    Specimen: Blood from Hand, Left Updated: 06/19/24 0518     WBC 10.40 10*3/mm3      RBC 4.53 10*6/mm3      Hemoglobin 13.8 g/dL      Hematocrit 42.3 %      MCV 93.4 fL      MCH 30.5 pg      MCHC 32.6 g/dL      RDW 14.6 %      RDW-SD 50.2 fl      MPV 9.6 fL      Platelets 230 10*3/mm3      Neutrophil % 59.8 %      Lymphocyte % 29.1 %      Monocyte % 7.2 %      Eosinophil % 2.6 %      Basophil % 0.9 %      Immature Grans % 0.4 %      Neutrophils, Absolute 6.22 10*3/mm3      Lymphocytes, Absolute 3.03 10*3/mm3      Monocytes, Absolute 0.75 10*3/mm3      Eosinophils, Absolute 0.27 10*3/mm3      Basophils, Absolute 0.09 10*3/mm3      Immature Grans, Absolute 0.04 10*3/mm3      nRBC 0.0 /100 WBC              Imaging:    Adult Transthoracic Echo Complete W/ Cont if Necessary Per Protocol    Result Date: 6/19/2024    Technically difficult study.  IV Lumason contrast was used to enhance endocardial borders.   Left ventricular systolic function is normal. Calculated left ventricular EF = 55.3%   Left ventricular wall thickness is consistent with mild concentric hypertrophy.   Left ventricular diastolic function was normal.   No hemodynamically significant valvular pathology.        Differential Diagnosis and Discussion:    Palpitations: Differential diagnosis includes but is not limited to anxiety, atrioventricular blocks, mitral valve disease, hypoxia, coronary artery  disease, hypokalemia, anemia, fever, COPD, congestive heart failure, pericarditis, [place]-Parkinson-White syndrome, pulmonary embolism, SVT, atrial fibrillation, atrial flutter, sinus tachycardia, thyrotoxicosis, and pheochromocytoma.    All labs were reviewed and interpreted by me.  EKG was interpreted by me.    MDM     Amount and/or Complexity of Data Reviewed  Clinical lab tests: reviewed  Tests in the radiology section of CPT®: reviewed  Tests in the medicine section of CPT®: reviewed         Critical Care Note: Total Critical Care time of 50 minutes. Total critical care time documented does not include time spent on separately billed procedures for services of nurses or physician assistants. I personally saw and examined the patient. I have reviewed all diagnostic interpretations and treatment plans as written. I was present for the key portions of any procedures performed and the inclusive time noted in any critical care statement. Critical care time includes patient management by me, time spent at the patients bedside,  time to review lab and imaging results, discussing patient care, documentation in the medical record, and time spent with family or caregiver.        Patient Care Considerations:    SEPSIS IS NOT PRESENT IN THE EMERGENCY DEPARTMENT: Patient meets SIRS criteria in the emergency department however the patient does not have a known source of bacterial infection to confirm the diagnosis of sepsis.        Consultants/Shared Management Plan:    Hospitalist: I have discussed the case with hospitalist who agrees to accept the patient for admission.    Social Determinants of Health:    Patient is unable to carry out activities of daily life. Escalation of care is necessary.       Disposition and Care Coordination:    Admit:   Through independent evaluation of the patient's history, physical, and imperical data, the patient meets criteria for inpatient admission to the hospital.        Final diagnoses:    Atrial fibrillation with rapid ventricular response        ED Disposition       ED Disposition   Decision to Admit    Condition   --    Comment   Level of Care: Telemetry [5]   Diagnosis: Atrial fibrillation with RVR [[id number]]   Admitting Physician: SUJEY JUAN [[id number]]   Attending Physician: SUJEY JUAN [[id number]]                 This medical record created using voice recognition software.             Ramon Altman, DO  06/19/24 1403

## 2024-06-18 NOTE — PLAN OF CARE
Goal Outcome Evaluation:      Patient arrived from ED. Cardizem gtt at 12.5mg/hr. No signs of acute distress noted. Call light within reach.

## 2024-06-19 ENCOUNTER — READMISSION MANAGEMENT (OUTPATIENT)
Dept: CALL CENTER | Facility: HOSPITAL | Age: 64
End: 2024-06-19
Payer: OTHER GOVERNMENT

## 2024-06-19 ENCOUNTER — TELEPHONE (OUTPATIENT)
Dept: CARDIOLOGY | Facility: CLINIC | Age: 64
End: 2024-06-19
Payer: OTHER GOVERNMENT

## 2024-06-19 VITALS
BODY MASS INDEX: 40.61 KG/M2 | TEMPERATURE: 97.9 F | HEIGHT: 72 IN | HEART RATE: 73 BPM | OXYGEN SATURATION: 96 % | SYSTOLIC BLOOD PRESSURE: 119 MMHG | RESPIRATION RATE: 16 BRPM | WEIGHT: 299.8 LBS | DIASTOLIC BLOOD PRESSURE: 80 MMHG

## 2024-06-19 PROBLEM — I48.3 TYPICAL ATRIAL FLUTTER: Status: ACTIVE | Noted: 2024-06-19

## 2024-06-19 LAB
ANION GAP SERPL CALCULATED.3IONS-SCNC: 12.9 MMOL/L (ref 5–15)
BASOPHILS # BLD AUTO: 0.09 10*3/MM3 (ref 0–0.2)
BASOPHILS NFR BLD AUTO: 0.9 % (ref 0–1.5)
BH CV ECHO MEAS - AO ROOT DIAM: 3.5 CM
BH CV ECHO MEAS - EDV(CUBED): 109.9 ML
BH CV ECHO MEAS - EDV(MOD-SP2): 54.9 ML
BH CV ECHO MEAS - EDV(MOD-SP4): 88.6 ML
BH CV ECHO MEAS - EF(MOD-BP): 55.3 %
BH CV ECHO MEAS - EF(MOD-SP2): 49 %
BH CV ECHO MEAS - EF(MOD-SP4): 62.4 %
BH CV ECHO MEAS - ESV(CUBED): 41.3 ML
BH CV ECHO MEAS - ESV(MOD-SP2): 28 ML
BH CV ECHO MEAS - ESV(MOD-SP4): 33.3 ML
BH CV ECHO MEAS - FS: 27.8 %
BH CV ECHO MEAS - IVS/LVPW: 0.99 CM
BH CV ECHO MEAS - IVSD: 1.4 CM
BH CV ECHO MEAS - LA DIMENSION: 3.5 CM
BH CV ECHO MEAS - LAT PEAK E' VEL: 9.8 CM/SEC
BH CV ECHO MEAS - LV MASS(C)D: 276.3 GRAMS
BH CV ECHO MEAS - LVIDD: 4.8 CM
BH CV ECHO MEAS - LVIDS: 3.5 CM
BH CV ECHO MEAS - LVOT AREA: 3.1 CM2
BH CV ECHO MEAS - LVOT DIAM: 2 CM
BH CV ECHO MEAS - LVPWD: 1.42 CM
BH CV ECHO MEAS - MED PEAK E' VEL: 7 CM/SEC
BH CV ECHO MEAS - MV A MAX VEL: 63 CM/SEC
BH CV ECHO MEAS - MV DEC SLOPE: 519.8 CM/SEC2
BH CV ECHO MEAS - MV DEC TIME: 0.17 SEC
BH CV ECHO MEAS - MV E MAX VEL: 90.6 CM/SEC
BH CV ECHO MEAS - MV E/A: 1.44
BH CV ECHO MEAS - RVDD: 3 CM
BH CV ECHO MEAS - SV(MOD-SP2): 26.9 ML
BH CV ECHO MEAS - SV(MOD-SP4): 55.3 ML
BH CV ECHO MEASUREMENTS AVERAGE E/E' RATIO: 10.79
BUN SERPL-MCNC: 17 MG/DL (ref 8–23)
BUN/CREAT SERPL: 13.2 (ref 7–25)
CALCIUM SPEC-SCNC: 8.7 MG/DL (ref 8.6–10.5)
CHLORIDE SERPL-SCNC: 106 MMOL/L (ref 98–107)
CO2 SERPL-SCNC: 19.1 MMOL/L (ref 22–29)
CREAT SERPL-MCNC: 1.29 MG/DL (ref 0.76–1.27)
DEPRECATED RDW RBC AUTO: 50.2 FL (ref 37–54)
EGFRCR SERPLBLD CKD-EPI 2021: 63.1 ML/MIN/1.73
EOSINOPHIL # BLD AUTO: 0.27 10*3/MM3 (ref 0–0.4)
EOSINOPHIL NFR BLD AUTO: 2.6 % (ref 0.3–6.2)
ERYTHROCYTE [DISTWIDTH] IN BLOOD BY AUTOMATED COUNT: 14.6 % (ref 12.3–15.4)
GLUCOSE SERPL-MCNC: 109 MG/DL (ref 65–99)
HCT VFR BLD AUTO: 42.3 % (ref 37.5–51)
HGB BLD-MCNC: 13.8 G/DL (ref 13–17.7)
IMM GRANULOCYTES # BLD AUTO: 0.04 10*3/MM3 (ref 0–0.05)
IMM GRANULOCYTES NFR BLD AUTO: 0.4 % (ref 0–0.5)
LYMPHOCYTES # BLD AUTO: 3.03 10*3/MM3 (ref 0.7–3.1)
LYMPHOCYTES NFR BLD AUTO: 29.1 % (ref 19.6–45.3)
MAGNESIUM SERPL-MCNC: 2 MG/DL (ref 1.6–2.4)
MCH RBC QN AUTO: 30.5 PG (ref 26.6–33)
MCHC RBC AUTO-ENTMCNC: 32.6 G/DL (ref 31.5–35.7)
MCV RBC AUTO: 93.4 FL (ref 79–97)
MONOCYTES # BLD AUTO: 0.75 10*3/MM3 (ref 0.1–0.9)
MONOCYTES NFR BLD AUTO: 7.2 % (ref 5–12)
NEUTROPHILS NFR BLD AUTO: 59.8 % (ref 42.7–76)
NEUTROPHILS NFR BLD AUTO: 6.22 10*3/MM3 (ref 1.7–7)
NRBC BLD AUTO-RTO: 0 /100 WBC (ref 0–0.2)
PHOSPHATE SERPL-MCNC: 3.3 MG/DL (ref 2.5–4.5)
PLATELET # BLD AUTO: 230 10*3/MM3 (ref 140–450)
PMV BLD AUTO: 9.6 FL (ref 6–12)
POTASSIUM SERPL-SCNC: 3.9 MMOL/L (ref 3.5–5.2)
RBC # BLD AUTO: 4.53 10*6/MM3 (ref 4.14–5.8)
SODIUM SERPL-SCNC: 138 MMOL/L (ref 136–145)
WBC NRBC COR # BLD AUTO: 10.4 10*3/MM3 (ref 3.4–10.8)

## 2024-06-19 PROCEDURE — 85025 COMPLETE CBC W/AUTO DIFF WBC: CPT | Performed by: STUDENT IN AN ORGANIZED HEALTH CARE EDUCATION/TRAINING PROGRAM

## 2024-06-19 PROCEDURE — 96366 THER/PROPH/DIAG IV INF ADDON: CPT

## 2024-06-19 PROCEDURE — G0378 HOSPITAL OBSERVATION PER HR: HCPCS

## 2024-06-19 PROCEDURE — 99204 OFFICE O/P NEW MOD 45 MIN: CPT | Performed by: INTERNAL MEDICINE

## 2024-06-19 PROCEDURE — 94664 DEMO&/EVAL PT USE INHALER: CPT

## 2024-06-19 PROCEDURE — 94799 UNLISTED PULMONARY SVC/PX: CPT

## 2024-06-19 PROCEDURE — 94640 AIRWAY INHALATION TREATMENT: CPT

## 2024-06-19 PROCEDURE — 84100 ASSAY OF PHOSPHORUS: CPT | Performed by: STUDENT IN AN ORGANIZED HEALTH CARE EDUCATION/TRAINING PROGRAM

## 2024-06-19 PROCEDURE — 99239 HOSP IP/OBS DSCHRG MGMT >30: CPT | Performed by: STUDENT IN AN ORGANIZED HEALTH CARE EDUCATION/TRAINING PROGRAM

## 2024-06-19 PROCEDURE — 80048 BASIC METABOLIC PNL TOTAL CA: CPT | Performed by: STUDENT IN AN ORGANIZED HEALTH CARE EDUCATION/TRAINING PROGRAM

## 2024-06-19 PROCEDURE — 83735 ASSAY OF MAGNESIUM: CPT | Performed by: STUDENT IN AN ORGANIZED HEALTH CARE EDUCATION/TRAINING PROGRAM

## 2024-06-19 RX ORDER — METOPROLOL TARTRATE 50 MG/1
50 TABLET, FILM COATED ORAL EVERY 12 HOURS SCHEDULED
Status: DISCONTINUED | OUTPATIENT
Start: 2024-06-19 | End: 2024-06-19

## 2024-06-19 RX ADMIN — APIXABAN 5 MG: 5 TABLET, FILM COATED ORAL at 08:55

## 2024-06-19 RX ADMIN — TIOTROPIUM BROMIDE INHALATION SPRAY 2 PUFF: 3.12 SPRAY, METERED RESPIRATORY (INHALATION) at 10:37

## 2024-06-19 RX ADMIN — SENNOSIDES AND DOCUSATE SODIUM 2 TABLET: 50; 8.6 TABLET ORAL at 08:56

## 2024-06-19 RX ADMIN — METOPROLOL TARTRATE 50 MG: 50 TABLET, FILM COATED ORAL at 08:55

## 2024-06-19 RX ADMIN — Medication 10 ML: at 08:54

## 2024-06-19 RX ADMIN — BUPROPION HYDROCHLORIDE 150 MG: 75 TABLET ORAL at 08:55

## 2024-06-19 RX ADMIN — MIDODRINE HYDROCHLORIDE 2.5 MG: 2.5 TABLET ORAL at 02:58

## 2024-06-19 NOTE — CASE MANAGEMENT/SOCIAL WORK
Discharge Planning Assessment  SHAMIKA [facility]     Patient Name: Sukhdeep Valladares  MRN: [id number]  Today's Date: 6/19/2024    Admit Date: 6/18/2024    Plan: Pt lives with wife. Pt usually indpendent at home. PCP: JOHN Alvarez, [facility], [facility]. Pt states at this time he has no fin. concerns. Pt did have to retire from his job due to medical issues. Pt states that he should start getting his social security in October. At this time he does not have issues affording his medications. Wife works outside the home. Pt has a CPAP with [employer]. SW will continue to follow for needs.   Discharge Needs Assessment       Row Name 06/19/24 1038       Living Environment    People in Home spouse    Current Living Arrangements home    Potentially Unsafe Housing Conditions none    In the past 12 months has the electric, gas, oil, or water company threatened to shut off services in your home? No    Primary Care Provided by self    Provides Primary Care For no one    Family Caregiver if Needed spouse    Quality of Family Relationships helpful;involved    Able to Return to Prior Arrangements yes    Living Arrangement Comments Pt lives with wife, Pt has good support from family.       Resource/Environmental Concerns    Resource/Environmental Concerns none    Transportation Concerns none       Transportation Needs    In the past 12 months, has lack of transportation kept you from medical appointments or from getting medications? no    In the past 12 months, has lack of transportation kept you from meetings, work, or from getting things needed for daily living? No       Food Insecurity    Within the past 12 months, the food you bought just didn't last and you didn't have money to get more. Never true       Transition Planning    Patient/Family Anticipates Transition to home;home with family       Discharge Needs Assessment    Readmission Within the Last 30 Days no previous admission in last 30  days    Equipment Currently Used at Home bp cuff;scales;cpap    Concerns to be Addressed discharge planning    Anticipated Changes Related to Illness none    Equipment Needed After Discharge none    Discharge Coordination/Progress Pt lives with wife. Pt usually indpendent at home. PCP: JOHN Alvarez, [facility]. Pt states at this time he has no fin. concerns. Pt did have to retire from his job due to medical issues. Pt states that he should start getting his social security in October. At this time he does not have issues affording his medications. Wife works outside the home. Pt has a CPAP with [facility]. SW will continue to follow for needs.                   Discharge Plan       Row Name 06/19/24 1043       Plan    Plan Pt lives with wife. Pt usually indpendent at home. PCP: JOHN Alvarez, [facility]. Pt states at this time he has no fin. concerns. Pt did have to retire from his job due to medical issues. Pt states that he should start getting his social security in October. At this time he does not have issues affording his medications. Wife works outside the home. Pt has a CPAP with [facility]. SW will continue to follow for needs.                  Continued Care and Services - Admitted Since 6/18/2024    No active coordination exists for this encounter.          Demographic Summary       Row Name 06/19/24 1034       General Information    Admission Type observation    Arrived From emergency department    Referral Source admission list    Reason for Consult discharge planning    Preferred Language English       Contact Information    Permission Granted to Share Info With permission denied                   Functional Status       Row Name 06/19/24 1036       Mental Status Summary    Recent Changes in Mental Status/Cognitive Functioning no changes       Employment/    Employment Status unemployed     Employment/ Comments Pt states he had to quit his job recently, he was an over the road . He should be getting his Social security starting in October 2024.      Row Name 06/19/24 1034       Functional Status    Usual Activity Tolerance good    Current Activity Tolerance good       Physical Activity    On average, how many days per week do you engage in moderate to strenuous exercise (like a brisk walk)? 0 days    On average, how many minutes do you engage in exercise at this level? 0 min    Number of minutes of exercise per week 0       Assessment of Health Literacy    How often do you have someone help you read hospital materials? Never    How often do you have problems learning about your medical condition because of difficulty understanding written information? Never    How often do you have a problem understanding what is told to you about your medical condition? Never    How confident are you filling out medical forms by yourself? Quite a bit    Health Literacy Good       Functional Status, IADL    Medications independent    Meal Preparation independent    Housekeeping independent    Laundry independent    Shopping independent       Mental Status    General Appearance WDL WDL                   Psychosocial    No documentation.                  Abuse/Neglect    No documentation.                  Legal       Row Name 06/19/24 1037       Financial Resource Strain    How hard is it for you to pay for the very basics like food, housing, medical care, and heating? Not very       Financial/Legal    Source of Income salary/wages    Application for Public Assistance not applied       Legal    Criminal Activity/Legal Involvement none                   Substance Abuse    No documentation.                  Patient Forms    No documentation.                     Mali Hendricks

## 2024-06-19 NOTE — OUTREACH NOTE
Prep Survey      Flowsheet Row Responses   [facility] [place] facility patient discharged from? [facility]   Is LACE score < 7 ? Yes   Eligibility Not Eligible   What are the reasons patient is not eligible? Other  [Readmission Risk score low]   Does the patient have one of the following disease processes/diagnoses(primary or secondary)? Other   Prep survey completed? Yes            Whitney PEREZ - Registered Nurse

## 2024-06-19 NOTE — TELEPHONE ENCOUNTER
Caller: CHIRAG    Relationship to patient: Provider    Best call back number: [phone]    Chief complaint: NO AVAILABILITY     Type of visit: HOSP F/U    Requested date: 1 WEEK     Additional notes: PT WAS CONSULTED BY DR BLOCK AND IS NEEDING A F/U. PLEASE CALL PT TO SCHEDULE, THANK YOU

## 2024-06-19 NOTE — DISCHARGE SUMMARY
[facility]         HOSPITALIST  DISCHARGE SUMMARY    Patient Name: Sukhdeep Valladares  : 1960  MRN: [id number]    Date of Admission: 2024  Date of Discharge:  2024  Primary Care Physician: Whitney Alvarez PA-C    Consults       Date and Time Order Name Status Description    2024  4:06 PM Inpatient Cardiology Consult      2024  3:49 PM Hospitalist (on-call MD unless specified)      2024  3:49 PM Cardiology (on-call MD unless specified)              Active and Resolved Hospital Problems:  Active Hospital Problems    Diagnosis POA   • **Atrial fibrillation with rapid ventricular response [I48.91] Yes   • Atrial fibrillation with RVR [I48.91] Yes     Priority: High   • Typical atrial flutter [I48.3] Unknown      Resolved Hospital Problems   No resolved problems to display.       Hospital Course     Hospital Course:  Sukhdeep Valladares is a 63 y.o. male with recently diagnosed A-fib on Eliquis, hypertension, hyperlipidemia who presented to ER today due to complaints of palpitation and generalized weakness.  Patient was recently admitted few weeks back at [facility] where he was found to have A-fib with RVR for which she was started on Eliquis and metoprolol.  He was then discharged home.  He has been compliant with medication but is having some stress at home related to work and family situation.  He was not feeling like himself last evening and was lightheaded.  His wife also noticed that he looked pale.  Today morning, he checked his heart rate and was found to be in 150s after which he presented to ER for further evaluation and management.  Denied any fever, chills, rigors, chest pain or difficulty breathing.  Denied any sick contact.     On presentation to the ER, patient was hypotensive with blood pressure 80/68 and tachycardic in 140s.  He was found to be in A-fib with RVR.  Lab work showed creatinine of 1.38 and bicarb of 21.6.  LFT nonsignificant.   Lactic acid 1.5 lipase 28.  WBC 12.68.  BNP of 2600.  UA not significant for UTI.  Chest x-ray showed no acute cardiopulmonary abnormality.  Patient was started on Cardizem drip.  Cardiology was consulted by ED physician and patient is being admitted to medicine service for further evaluation management of atrial flutter with RVR.  Patient was also hypotensive, likely due to a flutter with RVR.  Was started on Cardizem drip.  Heart rate eventually was better.  Cardiology was consulted.  He was transition to p.o. metoprolol tartrate today and Cardizem drip was turned off.  Heart rate stable.  Initially the plan was to give Multaq for converting his rhythm to sinus rhythm.  Later it converted to sinus rhythm by itself.  Thus Multaq was held.  His atrial flutter with RVR was likely triggered from his heavy alcohol consumption due to ongoing stress at home.  Patient was advised to be abstinent with alcohol.  Patient okay to be discharged from cardiac standpoint.  He is being discharged on metoprolol tartrate 25 mg every 12 hours.  He is being discharged home with self-care.  He is stable at the time of discharge.  Advised to be compliant with medications and diet.  He will follow-up with PCP in 3-7 days, needs CBC and chemistries trended during follow-up.  [place] in 1-2 weeks.  Might need ablation if he continues to have paroxysmal atrial flutter.  Advised to be compliant with medications and diet.  Midodrine discontinued at the time of discharge.  Continue Eliquis.        DISCHARGE Follow Up Recommendations for labs and diagnostics: As mentioned above.      Day of Discharge     Vital Signs:  Temp:  [97.7 °F (36.5 °C)-98.6 °F (37 °C)] 97.9 °F (36.6 °C)  Heart Rate:  [] 73  Resp:  [16-20] 16  BP: ()/(44-82) 119/80  Physical Exam:   Constitutional: Awake, alert, no acute distress              Respiratory: Clear to auscultation bilaterally, nonlabored respirations                Cardiovascular: RRR, no murmurs, rubs, or gallops, palpable pedal pulses bilaterally              Gastrointestinal: Positive bowel sounds, soft, nontender, nondistended              Neurologic: Oriented x 3, ,  speech clear               Discharge Details        Discharge Medications        New Medications        Instructions Start Date   metoprolol tartrate 25 MG tablet  Commonly known as: LOPRESSOR   25 mg, Oral, Every 12 Hours Scheduled             Continue These Medications        Instructions Start Date   albuterol 108 (90 Base) MCG/ACT inhaler  Commonly known as: PROAIR RESPICLICK   2 puffs, Inhalation, Every 4 Hours PRN      apixaban 5 MG tablet tablet  Commonly known as: ELIQUIS   5 mg, Oral, 2 Times Daily      atorvastatin 40 MG tablet  Commonly known as: LIPITOR   40 mg, Oral, Daily      buPROPion 75 MG tablet  Commonly known as: WELLBUTRIN   150 mg, Oral, 2 Times Daily      tamsulosin 0.4 MG capsule 24 hr capsule  Commonly known as: FLOMAX   2 capsules, Oral, Nightly      tiotropium 18 MCG per inhalation capsule  Commonly known as: SPIRIVA   1 capsule, Inhalation, Daily - RT      vitamin D 1.25 MG (14848 UT) capsule capsule  Commonly known as: ERGOCALCIFEROL   50,000 Units, Oral, Weekly, Saturday             Stop These Medications      metoprolol succinate XL 50 MG 24 hr tablet  Commonly known as: TOPROL-XL     midodrine 2.5 MG tablet  Commonly known as: PROAMATINE     sildenafil 100 MG tablet  Commonly known as: VIAGRA              No Known Allergies    Discharge Disposition:  Home or Self Care    Diet:  Hospital:  Diet Order   Procedures   • Diet: Cardiac; Healthy Heart (2-3 Na+); Fluid Consistency: Thin (IDDSI 0)       Discharge Activity:       CODE STATUS:  Code Status and Medical Interventions:   Ordered at: 06/19/24 0813     Code Status (Patient has no pulse and is not breathing):    CPR (Attempt to Resuscitate)     Medical Interventions (Patient has pulse or is breathing):    Full Support        Future Appointments   Date Time Provider Department Center   11/11/2024  1:00 PM Román, April, APRN [facility]       Additional Instructions for the Follow-ups that You Need to Schedule       Discharge Follow-up with PCP   As directed       Currently Documented PCP:    Whitney Alvarez PA-C    PCP Phone Number:    [phone]     Follow Up Details: In 3-7 days; needs CBC and chemistries trended during follow up.        Discharge Follow-up with Specified Provider: Cardiologist in [place]; 1 Week   As directed      To: Cardiologist in [place]   Follow Up: 1 Week   Follow Up Details: A fib with RVR; plan for ablation if persitently in A fib with RVR.                Pertinent  and/or Most Recent Results     PROCEDURES:       LAB RESULTS:      Lab 06/19/24  0434 06/18/24  1256 06/18/24  1213   WBC 10.40  --  12.68*   HEMOGLOBIN 13.8  --  16.4   HEMATOCRIT 42.3  --  47.8   PLATELETS 230  --  298   NEUTROS ABS 6.22  --  8.41*   IMMATURE GRANS (ABS) 0.04  --  0.04   LYMPHS ABS 3.03  --  3.08   MONOS ABS 0.75  --  0.81   EOS ABS 0.27  --  0.24   MCV 93.4  --  89.2   LACTATE  --  1.5  --          Lab 06/19/24  0434 06/18/24  1431 06/18/24  1213   SODIUM 138  --  138   POTASSIUM 3.9  --  4.0   CHLORIDE 106  --  103   CO2 19.1*  --  21.6*   ANION GAP 12.9  --  13.4   BUN 17  --  16   CREATININE 1.29*  --  1.38*   EGFR 63.1  --  58.2*   GLUCOSE 109*  --  107*   CALCIUM 8.7  --  9.3   MAGNESIUM 2.0  --  2.0   PHOSPHORUS 3.3  --   --    TSH  --  1.070  --          Lab 06/18/24  1213   TOTAL PROTEIN 7.0   ALBUMIN 4.2   GLOBULIN 2.8   ALT (SGPT) 18   AST (SGOT) 15   BILIRUBIN 0.8   ALK PHOS 109   LIPASE 28         Lab 06/18/24  1431 06/18/24  1213   PROBNP  --  2,600.0*   HSTROP T 11 13                 Brief Urine Lab Results  (Last result in the past 365 days)        Color   Clarity   Blood   Leuk Est   Nitrite   Protein   CREAT   Urine HCG        06/18/24 1601 Dark Yellow   Clear   Negative   Trace   Negative   Trace                  Microbiology Results (last 10 days)       Procedure Component Value - Date/Time    Urine Culture - Urine, Urine, Clean Catch [[id number]]  (Normal) Collected: 06/18/24 1601    Lab Status: Preliminary result Specimen: Urine, Clean Catch Updated: 06/19/24 1018     Urine Culture No growth    Blood Culture - Blood, Arm, Right [[id number]]  (Normal) Collected: 06/18/24 1219    Lab Status: Preliminary result Specimen: Blood from Arm, Right Updated: 06/19/24 1245     Blood Culture No growth at 24 hours    Blood Culture - Blood, Arm, Left [[id number]]  (Normal) Collected: 06/18/24 1219    Lab Status: Preliminary result Specimen: Blood from Arm, Left Updated: 06/19/24 1245     Blood Culture No growth at 24 hours            XR Chest 1 View    Result Date: 6/18/2024  Impression: No acute cardiopulmonary abnormality. Electronically Signed: Trever Finney MD  6/18/2024 1:31 PM EDT  Workstation ID: [id number]              Results for orders placed during the hospital encounter of 06/18/24    Adult Transthoracic Echo Complete W/ Cont if Necessary Per Protocol    Interpretation Summary  •  Technically difficult study.  IV Lumason contrast was used to enhance endocardial borders.  •  Left ventricular systolic function is normal. Calculated left ventricular EF = 55.3%  •  Left ventricular wall thickness is consistent with mild concentric hypertrophy.  •  Left ventricular diastolic function was normal.  •  No hemodynamically significant valvular pathology.      Labs Pending at Discharge:  Pending Labs       Order Current Status    Blood Culture - Blood, Arm, Left Preliminary result    Blood Culture - Blood, Arm, Right Preliminary result    Urine Culture - Urine, Urine, Clean Catch Preliminary result              Time spent on Discharge including face to face service:  35 minutes    Electronically signed by Tali Mancera MD, 06/19/24, 3:23 PM EDT.

## 2024-06-19 NOTE — PLAN OF CARE
Goal Outcome Evaluation:      Patient started off shift on cardizem drip, titrated down to lowest possible dose. Patient tolerated well. Patient off cardizem drip, tolerating well, controlled heart rate. BP was running soft during shift, MD aware. Patient rested well intermittently through the night. No complaints at this time.

## 2024-06-19 NOTE — PLAN OF CARE
Goal Outcome Evaluation:   Discharging to home. No complaints of pain. No acute distress noted.

## 2024-06-19 NOTE — CONSULTS
[facility]   Cardiology Consult Note      Patient Name: Sukhdeep Valladares  : 1960  MRN: [id number]  Primary Care Physician:  Whitney Alvarez PA-C  Date of admission: 2024    Subjective   Subjective     Chief Complaint: Atrial flutter with RVR    HPI:    Sukhdeep Valladares is a 63 y.o. male with hypertension, dyslipidemia, obesity, recently diagnosed atrial fibrillation/flutter with RVR, on Eliquis, presents to the emergency room with palpitations.  His symptoms started the day of presentation.  He reports intermittent palpitations/chest discomfort.  He has no dyspnea, dizziness, presyncope or syncope.  He has no fever, chills, cough or other complaints.  He had 6 beers on the day his symptoms started.    Patient was found to be in atrial flutter with RVR.  He was started on Cardizem infusion and was admitted for further evaluation.  He self converted to normal sinus rhythm overnight.    Review of Systems   All systems were reviewed and negative except as above    Personal History     Past Medical History:   Diagnosis Date    Essential (primary) hypertension     Hypertension     Mixed hyperlipidemia     Pulmonary nodule     Secondary polycythemia        Family History: family history is not on file. Otherwise pertinent FHx was reviewed and not pertinent to current issue.    Social History:  reports that he has been smoking cigarettes. He has never been exposed to tobacco smoke. He has never used smokeless tobacco. He reports current alcohol use of about 3.0 standard drinks of alcohol per week. He reports that he does not currently use drugs after having used the following drugs: Cocaine(coke).    Home Medications:  albuterol, apixaban, atorvastatin, buPROPion, metoprolol succinate XL, midodrine, sildenafil, tamsulosin, tiotropium, and vitamin D      Allergies:  No Known Allergies    Objective   Objective     Vitals:   Temp:  [97.7 °F (36.5 °C)-99.1 °F (37.3 °C)] 97.9 °F (36.6 °C)  Heart  Rate:  [] 73  Resp:  [12-20] 16  BP: ()/() 119/80  Physical Exam    Constitutional: Awake, alert   Eyes: PERRLA, sclerae anicteric, no conjunctival injection   HENT: NCAT, mucous membranes moist   Neck: Supple, no thyromegaly, no lymphadenopathy, trachea midline   Respiratory: Clear to auscultation bilaterally, nonlabored respirations    Cardiovascular: RRR, no murmurs, rubs, or gallops, palpable pedal pulses bilaterally   Gastrointestinal: Positive bowel sounds, soft, nontender, nondistended   Musculoskeletal: No bilateral ankle edema, no clubbing or cyanosis to extremities   Psychiatric: Appropriate affect, cooperative   Neurologic: Oriented x 3, strength symmetric in all extremities, Cranial Nerves grossly intact to confrontation, speech clear   Skin: No rashes     Result Review    Result Review:  I have personally reviewed the results from the time of this admission to 6/19/2024 12:02 EDT and agree with these findings:  [x]  Laboratory  []  Microbiology  [x]  Radiology  [x]  EKG/Telemetry   [x]  Cardiology/Vascular   []  Pathology  [x]  Old records  []  Other:  Most notable findings include:    Assessment & Plan   Assessment / Plan     Brief Patient Summary:  Sukhdeep Valladares is a 63 y.o. male with:    Atrial flutter with rapid ventricular response, self converted to normal sinus rhythm.  On Eliquis.  On Cardizem infusion.  [id number] score is 1.  Hypertension  Dyslipidemia  Morbid obesity  Alcohol abuse    Plan:   Atrial flutter with ventricular response, possibly triggered by heavy alcohol use.  He self converted to normal sinus rhythm.  Metoprolol dose will be reduced to 25 mg twice daily.  He will be started on Multaq 400 mg twice daily to try to maintain normal sinus rhythm.  Continue Eliquis for stroke risk reduction.  If he continues to have paroxysmal atrial flutter, he will be considered for ablation procedure.  This will be addressed in the outpatient setting.  His echo was noted  and was benign.  Midodrine will be discontinued.    The patient may be discharged from cardiac perspective.  Please call with any questions.      Electronically signed by Patty Lewis MD, 06/19/24, 12:07 PM EDT.

## 2024-06-20 LAB — BACTERIA SPEC AEROBE CULT: NORMAL

## 2024-06-23 LAB
BACTERIA SPEC AEROBE CULT: NORMAL
BACTERIA SPEC AEROBE CULT: NORMAL

## 2024-06-24 LAB
QT INTERVAL: 290 MS
QT INTERVAL: 356 MS
QT INTERVAL: 503 MS
QTC INTERVAL: 456 MS
QTC INTERVAL: 542 MS
QTC INTERVAL: 597 MS

## 2024-07-12 ENCOUNTER — APPOINTMENT (OUTPATIENT)
Dept: GENERAL RADIOLOGY | Facility: HOSPITAL | Age: 64
End: 2024-07-12
Payer: OTHER GOVERNMENT

## 2024-07-12 ENCOUNTER — HOSPITAL ENCOUNTER (EMERGENCY)
Facility: HOSPITAL | Age: 64
Discharge: HOME | End: 2024-07-12
Attending: EMERGENCY MEDICINE
Payer: OTHER GOVERNMENT

## 2024-07-12 VITALS
BODY MASS INDEX: 40.64 KG/M2 | HEART RATE: 137 BPM | HEIGHT: 72 IN | SYSTOLIC BLOOD PRESSURE: 110 MMHG | DIASTOLIC BLOOD PRESSURE: 95 MMHG | WEIGHT: 300.05 LBS | OXYGEN SATURATION: 95 % | TEMPERATURE: 98.8 F | RESPIRATION RATE: 20 BRPM

## 2024-07-12 DIAGNOSIS — I48.91 ATRIAL FIBRILLATION WITH RAPID VENTRICULAR RESPONSE: Primary | ICD-10-CM

## 2024-07-12 LAB
ALBUMIN SERPL-MCNC: 4 G/DL (ref 3.5–5.2)
ALBUMIN/GLOB SERPL: 1.5 G/DL
ALP SERPL-CCNC: 93 U/L (ref 39–117)
ALT SERPL W P-5'-P-CCNC: 29 U/L (ref 1–41)
ANION GAP SERPL CALCULATED.3IONS-SCNC: 11.3 MMOL/L (ref 5–15)
AST SERPL-CCNC: 18 U/L (ref 1–40)
BASOPHILS # BLD AUTO: 0.09 10*3/MM3 (ref 0–0.2)
BASOPHILS NFR BLD AUTO: 0.8 % (ref 0–1.5)
BILIRUB SERPL-MCNC: 0.4 MG/DL (ref 0–1.2)
BUN SERPL-MCNC: 17 MG/DL (ref 8–23)
BUN/CREAT SERPL: 13.4 (ref 7–25)
CALCIUM SPEC-SCNC: 9.1 MG/DL (ref 8.6–10.5)
CHLORIDE SERPL-SCNC: 105 MMOL/L (ref 98–107)
CO2 SERPL-SCNC: 23.7 MMOL/L (ref 22–29)
CREAT SERPL-MCNC: 1.27 MG/DL (ref 0.76–1.27)
DEPRECATED RDW RBC AUTO: 47.4 FL (ref 37–54)
EGFRCR SERPLBLD CKD-EPI 2021: 64.3 ML/MIN/1.73
EOSINOPHIL # BLD AUTO: 0.35 10*3/MM3 (ref 0–0.4)
EOSINOPHIL NFR BLD AUTO: 2.9 % (ref 0.3–6.2)
ERYTHROCYTE [DISTWIDTH] IN BLOOD BY AUTOMATED COUNT: 14.3 % (ref 12.3–15.4)
GLOBULIN UR ELPH-MCNC: 2.6 GM/DL
GLUCOSE SERPL-MCNC: 103 MG/DL (ref 65–99)
HCT VFR BLD AUTO: 49.1 % (ref 37.5–51)
HGB BLD-MCNC: 16.8 G/DL (ref 13–17.7)
HOLD SPECIMEN: NORMAL
HOLD SPECIMEN: NORMAL
IMM GRANULOCYTES # BLD AUTO: 0.03 10*3/MM3 (ref 0–0.05)
IMM GRANULOCYTES NFR BLD AUTO: 0.3 % (ref 0–0.5)
LYMPHOCYTES # BLD AUTO: 3.72 10*3/MM3 (ref 0.7–3.1)
LYMPHOCYTES NFR BLD AUTO: 31.3 % (ref 19.6–45.3)
MAGNESIUM SERPL-MCNC: 2 MG/DL (ref 1.6–2.4)
MCH RBC QN AUTO: 30.9 PG (ref 26.6–33)
MCHC RBC AUTO-ENTMCNC: 34.2 G/DL (ref 31.5–35.7)
MCV RBC AUTO: 90.4 FL (ref 79–97)
MONOCYTES # BLD AUTO: 0.8 10*3/MM3 (ref 0.1–0.9)
MONOCYTES NFR BLD AUTO: 6.7 % (ref 5–12)
NEUTROPHILS NFR BLD AUTO: 58 % (ref 42.7–76)
NEUTROPHILS NFR BLD AUTO: 6.91 10*3/MM3 (ref 1.7–7)
NRBC BLD AUTO-RTO: 0 /100 WBC (ref 0–0.2)
PLATELET # BLD AUTO: 266 10*3/MM3 (ref 140–450)
PMV BLD AUTO: 9.3 FL (ref 6–12)
POTASSIUM SERPL-SCNC: 4.3 MMOL/L (ref 3.5–5.2)
PROT SERPL-MCNC: 6.6 G/DL (ref 6–8.5)
RBC # BLD AUTO: 5.43 10*6/MM3 (ref 4.14–5.8)
SODIUM SERPL-SCNC: 140 MMOL/L (ref 136–145)
TROPONIN T SERPL HS-MCNC: 12 NG/L
TSH SERPL DL<=0.05 MIU/L-ACNC: 2.14 UIU/ML (ref 0.27–4.2)
WBC NRBC COR # BLD AUTO: 11.9 10*3/MM3 (ref 3.4–10.8)
WHOLE BLOOD HOLD COAG: NORMAL
WHOLE BLOOD HOLD SPECIMEN: NORMAL

## 2024-07-12 PROCEDURE — 96375 TX/PRO/DX INJ NEW DRUG ADDON: CPT

## 2024-07-12 PROCEDURE — 83735 ASSAY OF MAGNESIUM: CPT

## 2024-07-12 PROCEDURE — 84484 ASSAY OF TROPONIN QUANT: CPT

## 2024-07-12 PROCEDURE — 93005 ELECTROCARDIOGRAM TRACING: CPT

## 2024-07-12 PROCEDURE — 71045 X-RAY EXAM CHEST 1 VIEW: CPT

## 2024-07-12 PROCEDURE — 80053 COMPREHEN METABOLIC PANEL: CPT

## 2024-07-12 PROCEDURE — 93005 ELECTROCARDIOGRAM TRACING: CPT | Performed by: EMERGENCY MEDICINE

## 2024-07-12 PROCEDURE — 84443 ASSAY THYROID STIM HORMONE: CPT

## 2024-07-12 PROCEDURE — 85025 COMPLETE CBC W/AUTO DIFF WBC: CPT

## 2024-07-12 PROCEDURE — 25810000003 SODIUM CHLORIDE 0.9 % SOLUTION: Performed by: EMERGENCY MEDICINE

## 2024-07-12 PROCEDURE — 96374 THER/PROPH/DIAG INJ IV PUSH: CPT

## 2024-07-12 PROCEDURE — 99284 EMERGENCY DEPT VISIT MOD MDM: CPT

## 2024-07-12 RX ORDER — SODIUM CHLORIDE 0.9 % (FLUSH) 0.9 %
10 SYRINGE (ML) INJECTION AS NEEDED
Status: DISCONTINUED | OUTPATIENT
Start: 2024-07-12 | End: 2024-07-13 | Stop reason: HOSPADM

## 2024-07-12 RX ORDER — DILTIAZEM HYDROCHLORIDE 5 MG/ML
10 INJECTION INTRAVENOUS ONCE
Status: COMPLETED | OUTPATIENT
Start: 2024-07-12 | End: 2024-07-12

## 2024-07-12 RX ADMIN — SODIUM CHLORIDE 1000 ML: 9 INJECTION, SOLUTION INTRAVENOUS at 19:52

## 2024-07-12 RX ADMIN — DILTIAZEM HYDROCHLORIDE 10 MG: 5 INJECTION, SOLUTION INTRAVENOUS at 19:38

## 2024-07-12 RX ADMIN — METOPROLOL TARTRATE 5 MG: 1 INJECTION, SOLUTION INTRAVENOUS at 21:20

## 2024-07-13 LAB
QT INTERVAL: 289 MS
QTC INTERVAL: 450 MS

## 2024-07-13 NOTE — DISCHARGE INSTRUCTIONS
Increase your metoprolol dose to 50 mg twice daily current 25 mg twice daily starting tomorrow morning.

## 2024-07-13 NOTE — ED NOTES
Dr. Ley notified of pt heart rate still in 130's and asked about pt being discharged. Per Dr. Ley pt is still able to be discharged per cardiology

## 2024-07-27 ENCOUNTER — APPOINTMENT (OUTPATIENT)
Dept: GENERAL RADIOLOGY | Facility: HOSPITAL | Age: 64
End: 2024-07-27
Payer: OTHER GOVERNMENT

## 2024-07-27 ENCOUNTER — HOSPITAL ENCOUNTER (EMERGENCY)
Facility: HOSPITAL | Age: 64
Discharge: HOME | End: 2024-07-27
Attending: EMERGENCY MEDICINE
Payer: OTHER GOVERNMENT

## 2024-07-27 VITALS
HEART RATE: 116 BPM | RESPIRATION RATE: 30 BRPM | WEIGHT: 296.3 LBS | HEIGHT: 72 IN | DIASTOLIC BLOOD PRESSURE: 84 MMHG | BODY MASS INDEX: 40.13 KG/M2 | SYSTOLIC BLOOD PRESSURE: 128 MMHG | OXYGEN SATURATION: 94 %

## 2024-07-27 DIAGNOSIS — R07.89 ATYPICAL CHEST PAIN: ICD-10-CM

## 2024-07-27 DIAGNOSIS — F10.920 ACUTE ALCOHOLIC INTOXICATION WITHOUT COMPLICATION: ICD-10-CM

## 2024-07-27 DIAGNOSIS — Z00.8 MEDICAL CLEARANCE FOR INCARCERATION: Primary | ICD-10-CM

## 2024-07-27 LAB
QT INTERVAL: 330 MS
QTC INTERVAL: 451 MS

## 2024-07-27 PROCEDURE — 99284 EMERGENCY DEPT VISIT MOD MDM: CPT

## 2024-07-27 PROCEDURE — 93005 ELECTROCARDIOGRAM TRACING: CPT | Performed by: EMERGENCY MEDICINE

## 2024-07-27 PROCEDURE — 93005 ELECTROCARDIOGRAM TRACING: CPT

## 2024-07-27 RX ORDER — SODIUM CHLORIDE 0.9 % (FLUSH) 0.9 %
10 SYRINGE (ML) INJECTION AS NEEDED
Status: DISCONTINUED | OUTPATIENT
Start: 2024-07-27 | End: 2024-07-27 | Stop reason: HOSPADM

## 2024-07-27 RX ORDER — ASPIRIN 81 MG/1
324 TABLET, CHEWABLE ORAL ONCE
Status: DISCONTINUED | OUTPATIENT
Start: 2024-07-27 | End: 2024-07-27 | Stop reason: HOSPADM

## 2024-07-27 NOTE — DISCHARGE INSTRUCTIONS
Your EKG today looked okay and no signs of atrial fibrillation were seen at this time and no signs of a heart attack were seen.        At this time you are medically cleared to be discharged from the emergency department in police custody.

## 2024-07-27 NOTE — ED PROVIDER NOTES
"Time: 5:49 PM EDT  Date of encounter:  7/27/2024  Independent Historian/Clinical History and Information was obtained by:   Patient and Police    History is limited by:  alcohol intoxication    Chief Complaint: Medical clearance for long-term in police custody, alcohol intoxication, now complains of chest pain      History of Present Illness:  Patient is a 63 y.o. year old male with history of hypertension and hyperlipidemia and history of atrial fibrillation who presents to the emergency department for evaluation of acute chest pain in the setting of being placed in handcuffs under arrest by the police after they were called to his home for domestic violence.    He is acutely intoxicated with alcohol currently as well.    He is asking to be \"admitted to the psychiatric unit here\".    He is also stating that he is suicidal now.      The police are at the bedside stating that he is in custody and heading to long-term once he is medically cleared    HPI    Patient Care Team  Primary Care Provider: Whitney Alvarez PA-C    Past Medical History:     No Known Allergies  Past Medical History:   Diagnosis Date    Essential (primary) hypertension     Hypertension     Mixed hyperlipidemia     Pulmonary nodule     Secondary polycythemia      History reviewed. No pertinent surgical history.  History reviewed. No pertinent family history.    Home Medications:  Prior to Admission medications    Medication Sig Start Date End Date Taking? Authorizing Provider   albuterol (PROAIR RESPICLICK) 108 (90 Base) MCG/ACT inhaler Inhale 2 puffs Every 4 (Four) Hours As Needed for Wheezing. 10/17/22   Siva Gallegos MD   apixaban (ELIQUIS) 5 MG tablet tablet Take 1 tablet by mouth 2 (Two) Times a Day.    ProviderRuperto MD   atorvastatin (LIPITOR) 40 MG tablet Take 1 tablet by mouth Daily. 5/19/23   Siva Gallegos MD   buPROPion (WELLBUTRIN) 75 MG tablet Take 2 tablets by mouth 2 (Two) Times a Day.    ProviderRuperto MD   metoprolol " "tartrate (LOPRESSOR) 25 MG tablet Take 1 tablet by mouth Every 12 (Twelve) Hours for 30 days. 6/19/24 7/19/24  Tali Mancera MD   tamsulosin (FLOMAX) 0.4 MG capsule 24 hr capsule Take 2 capsules by mouth Every Night.    ProviderRuperto MD   tiotropium (SPIRIVA) 18 MCG per inhalation capsule Place 1 capsule into inhaler and inhale Daily.    ProviderRuperto MD   vitamin D (ERGOCALCIFEROL) 1.25 MG (41436 UT) capsule capsule Take 1 capsule by mouth 1 (One) Time Per Week. Saturday    ProviderRuperto MD        Social History:   Social History     Tobacco Use    Smoking status: Every Day     Current packs/day: 2.00     Types: Cigarettes     Passive exposure: Never    Smokeless tobacco: Never   Vaping Use    Vaping status: Never Used   Substance Use Topics    Alcohol use: Yes     Alcohol/week: 3.0 standard drinks of alcohol     Types: 3 Standard drinks or equivalent per week    Drug use: Not Currently     Types: Cocaine(coke)         Review of Systems:  Review of Systems   I performed a 10 point review of systems which was all negative, except for the positives found in the HPI above.  Physical Exam:  /84 (BP Location: Right arm, Patient Position: Lying)   Pulse 116   Resp (!) 30   Ht 182.9 cm (72\")   Wt 134 kg (296 lb 4.8 oz)   SpO2 94%   BMI 40.19 kg/m²     Physical Exam   General: Awake alert and in mild distress, appears anxious, intoxicated    HEENT: Head normocephalic atraumatic, eyes PERRLA EOMI, nose normal, oropharynx normal.  Mucous membranes look dry, somewhat dehydrated    Neck: Supple full range of motion, no meningismus, no lymphadenopathy    Heart: Regular rate and rhythm, no murmurs or rubs, 2+ radial pulses bilaterally    Lungs: Clear to auscultation bilaterally without wheezes or crackles, no respiratory distress    Abdomen: Soft, nontender, nondistended, no rebound or guarding    Skin: Warm, dry, no rash    Musculoskeletal: Normal range of motion, no lower extremity " edema    Neurologic: Oriented x3, no motor deficits no sensory deficits    Psychiatric: Mood appears anxious, appears intoxicated, reporting suicidal ideation, no psychosis          Procedures:  Procedures      Medical Decision Making:      Comorbidities that affect care:    Atrial Fibrillation, Hypertension, Smoking, Substance Abuse    External Notes reviewed:    Hospital Discharge Summary: I reviewed his most recent hospital discharge summary from last month where he was admitted for atrial fibrillation with RVR.      The following orders were placed and all results were independently analyzed by me:  Orders Placed This Encounter   Procedures    XR Chest 1 View    Lueders Draw    High Sensitivity Troponin T    Comprehensive Metabolic Panel    Lipase    BNP    Magnesium    Ethanol    CBC Auto Differential    NPO Diet NPO Type: Strict NPO    Undress & Gown    Continuous Pulse Oximetry    Oxygen Therapy- Nasal Cannula; Titrate 1-6 LPM Per SpO2; 90 - 95%    ECG 12 Lead Chest Pain    ECG 12 Lead ED Triage Standing Order; Chest Pain    ECG 12 Lead ED Triage Standing Order; Chest Pain    Insert Peripheral IV    CBC & Differential    Green Top (Gel)    Lavender Top    Gold Top - SST    Light Blue Top       Medications Given in the Emergency Department:  Medications   sodium chloride 0.9 % flush 10 mL (has no administration in time range)   aspirin chewable tablet 324 mg (has no administration in time range)        ED Course:    ED Course as of 07/27/24 1758   Sat Jul 27, 2024   1754 EKG: I interpreted his twelve-lead EKG as sinus tachycardia at 112 bpm, normal P waves, normal QRS, normal ST segments and T waves; no acute ischemia or ectopy. [VS]      ED Course User Index  [VS] Ronny Mcdonald MD       Labs:    Lab Results (last 24 hours)       ** No results found for the last 24 hours. **             Imaging:    No Radiology Exams Resulted Within Past 24 Hours      Differential Diagnosis and Discussion:    Chest Pain:   Based on the patient's signs and symptoms, I considered aortic dissection, myocardial infaction, pulmonary embolism, cardiac tamponade, pericarditis, pneumothorax, musculoskeletal chest pain and other differential diagnosis as an etiology of the patient's chest pain.   Psychiatric: Differential diagnosis includes but is not limited to depression, psychosis, bipolar disorder, anxiety, manic episode, schizophrenia, and substance abuse.    EKG was interpreted by me.    MDM           This patient is a 63-year-old male with history of hypertension and hyperlipidemia and recent hospital admission last month for A-fib with RVR, now presents in police custody in the setting of acute alcohol intoxication and domestic violence, here for medical clearance to go to [facility].    After he was arrested he complained of chest pain and suicidal thoughts and was brought to the ED for medical clearance.    He has a normal cardiopulmonary exam other than some mild tachycardia on arrival, in the setting of alcohol intoxication and some agitation.    His EKG shows no acute ischemia or ectopy and he is in sinus rhythm.    I recommended that we check some blood work including troponins since he was complaining of chest pain, to rule out MI, but he refused.    At this time, as he is refusing any further workup in the ED, and I do not see any actual emergent condition, I think he can be medically cleared to be discharged from the ED in police custody to [facility].                Patient Care Considerations:          Consultants/Shared Management Plan:        Social Determinants of Health:    Patient in police custody      Disposition and Care Coordination:    Discharged: The patient is suitable and stable for discharge with no need for consideration of admission.    I have explained the patient´s condition, diagnoses and treatment plan based on the information available to me at this time. I have answered questions and addressed any concerns. The  patient has a good  understanding of the patient´s diagnosis, condition, and treatment plan as can be expected at this point. The vital signs have been stable. The patient´s condition is stable and appropriate for discharge from the emergency department.      The patient will pursue further outpatient evaluation with the primary care physician or other designated or consulting physician as outlined in the discharge instructions. They are agreeable to this plan of care and follow-up instructions have been explained in detail. The patient has received these instructions in written format and has expressed an understanding of the discharge instructions. The patient is aware that any significant change in condition or worsening of symptoms should prompt an immediate return to this or the closest emergency department or call to 911.    Final diagnoses:   Medical clearance for incarceration   Acute alcoholic intoxication without complication   Atypical chest pain        ED Disposition       ED Disposition   Discharge    Condition   Stable    Comment   --               This medical record created using voice recognition software.             Ronny Mcdonald MD  07/27/24 5409

## 2024-08-04 LAB
QT INTERVAL: 330 MS
QTC INTERVAL: 451 MS

## 2024-11-07 ENCOUNTER — TELEPHONE (OUTPATIENT)
Dept: SURGERY | Facility: CLINIC | Age: 64
End: 2024-11-07
Payer: OTHER GOVERNMENT

## 2024-11-07 NOTE — TELEPHONE ENCOUNTER
PT CX'D HIS APPT WITH [facility] FOR 11/11/24, CALLED PT TO [facility] AND SEE IF HE HAS HIS [facility] RE[facility], NO ANSWER, LMOM/RF

## 2024-11-18 NOTE — TELEPHONE ENCOUNTER
2ND CALL,   PT CX'D HIS APPT WITH [facility] FOR 11/11/24, CALLED PT TO [facility] AND SEE IF HE HAS HIS [facility] REF, NO ANSWER, LMOM/RF

## 2024-11-19 NOTE — TELEPHONE ENCOUNTER
PT CALLED BACK AND SAID THAT HE WILL GET [facility] AND GET REFERRAL/ONCE WE RECEIVE REFERRAL WE WILL CALL PT TO SCHEDULE APPT

## 2025-02-24 NOTE — PROGRESS NOTES
Chief Complaint  Shortness of Breath (Had CXR on 1/3/2023, he states that he is still having trouble breathing. /Pt has been using all of the medications, except Arformoterol due to insurance won't pay for it. He states that he has been on the couch for 2 weeks and he has to go back to work and he has no energy. )    Subjective          Sukhdeep Valladares presents to [facility]     Influenza  Associated symptoms include congestion, coughing, fatigue and weakness. Pertinent negatives include no abdominal pain, arthralgias, chest pain or fever.   Shortness of Breath  Pertinent negatives include no abdominal pain, chest pain, ear pain, fever, leg swelling or wheezing.     History of present illness:  Patient is a 62-year-old male with underlying hypertension, hyperlipidemia, IFG, who is coming in 10/22 for Annual Exam/routine 4-month follow-up.  We will review his labs, any new concerns he may have, and make further recommendations at that time.---> Patient being seen 1/3/23 for urgent visit as described in the chief complaint above.---> Patient here 1/6/2023 for continued close follow-up as per the chief complaint above.    Review of Systems   Constitutional: Positive for fatigue. Negative for appetite change and fever.   HENT: Positive for congestion. Negative for ear pain.    Eyes: Negative for blurred vision.   Respiratory: Positive for cough and shortness of breath. Negative for chest tightness and wheezing.    Cardiovascular: Negative for chest pain, palpitations and leg swelling.   Gastrointestinal: Negative for abdominal pain.   Genitourinary: Negative for difficulty urinating, dysuria and hematuria.   Musculoskeletal: Negative for arthralgias and gait problem.   Skin: Negative for skin lesions.   Neurological: Positive for weakness. Negative for syncope, memory problem and confusion.   Psychiatric/Behavioral: Negative for self-injury and depressed mood.       Objective  Gastroenterology Pre-procedure H&P    History of Present Illness    Elaina Estrada is a 45 y.o. female that  has a past medical history of Anxiety, BRCA1 gene mutation positive, Depression, and Thyroid disease.     Patient with abdominal pain and reported h/o diverticulitis here for index colonoscopy       Past Medical History:   Diagnosis Date    Anxiety     BRCA1 gene mutation positive     Depression     Thyroid disease        Past Surgical History:   Procedure Laterality Date     SECTION      HYSTERECTOMY      TONSILLECTOMY      TOTAL ABDOMINAL HYSTERECTOMY W/ BILATERAL SALPINGOOPHORECTOMY      WISDOM TOOTH EXTRACTION         No family history on file.    Social History     Socioeconomic History    Marital status:     Number of children: 2   Tobacco Use    Smoking status: Never     Passive exposure: Never    Smokeless tobacco: Never   Substance and Sexual Activity    Alcohol use: Yes     Alcohol/week: 1.0 standard drink of alcohol     Types: 1 Glasses of wine per week     Comment: occassionally /once a month    Drug use: Never    Sexual activity: Yes     Partners: Male       Current Medications[1]    Review of patient's allergies indicates:  No Known Allergies    Objective:  There were no vitals filed for this visit.     GEN: normal appearing, NAD, AAO x3  HENT: NCAT, anicteric, OP benign  CV: normal rate, regular rhythm  RESP: NABS, symmetric rise, unlabored  ABD: soft, ND, no guarding or TTP  SKIN: warm and dry  NEURO: grossly afocal    Assessment and Plan:    Proceed with:    Colonoscopy for abdominal pain, h/o diverticulitis    Tj Bonilla MD  Gastroenterology       [1]   Current Outpatient Medications   Medication Sig Dispense Refill    azithromycin (Z-BETITO) 250 MG tablet Take 2 tablets (500 mg total) by mouth once daily. 5 tablet 0    cyanocobalamin, vitamin B-12, 1,000 mcg/mL Kit Give injection monthly 4 kit 3    estradioL (ESTRACE) 1 MG tablet Take 1 tablet (1 mg total) by mouth once    Vital Signs:   BP 90/70   Pulse 94   Temp 97 °F (36.1 °C) (Skin)   Ht 182.9 cm (72.01\")   Wt 130 kg (285 lb 9.6 oz)   SpO2 95%   BMI 38.73 kg/m²           Physical Exam  Vitals and nursing note reviewed.   Constitutional:       General: He is not in acute distress.     Appearance: Normal appearance. He is not toxic-appearing.   HENT:      Head: Atraumatic.      Right Ear: External ear normal.      Left Ear: External ear normal.      Nose: Nose normal.      Mouth/Throat:      Mouth: Mucous membranes are moist.   Eyes:      General:         Right eye: No discharge.         Left eye: No discharge.      Extraocular Movements: Extraocular movements intact.      Pupils: Pupils are equal, round, and reactive to light.   Cardiovascular:      Rate and Rhythm: Regular rhythm. Tachycardia present.      Pulses: Normal pulses.      Heart sounds: Normal heart sounds. No murmur heard.    No gallop.      Comments: Tachycardic but regular.  No significant ectopy.  Pulmonary:      Effort: Pulmonary effort is normal. No respiratory distress.      Breath sounds: Rhonchi present. No wheezing or rales.      Comments: Patient with scattered rhonchi and some expiratory wheezes 1/23.  Abdominal:      General: There is no distension.      Palpations: Abdomen is soft. There is no mass.      Tenderness: There is no abdominal tenderness. There is no guarding.   Musculoskeletal:         General: No swelling or tenderness.      Cervical back: No tenderness.      Right lower leg: No edema.      Left lower leg: No edema.      Comments: No edema.   Skin:     General: Skin is warm and dry.      Findings: No rash.   Neurological:      General: No focal deficit present.      Mental Status: He is alert and oriented to person, place, and time. Mental status is at baseline.      Motor: No weakness.      Gait: Gait normal.   Psychiatric:         Mood and Affect: Mood normal.         Thought Content: Thought content normal.          Result Review :  daily. 30 tablet 2    icosapent ethyL (VASCEPA) 1 gram Cap Take 2 capsules (2 g total) by mouth 2 (two) times daily. 360 capsule 1    levothyroxine (SYNTHROID) 125 MCG tablet Take 1 tablet (125 mcg total) by mouth before breakfast. 90 tablet 1    rosuvastatin (CRESTOR) 10 MG tablet TAKE 1 TABLET (10 MG TOTAL) BY MOUTH ONCE DAILY. 30 tablet 1    scopolamine (TRANSDERM-SCOP) 1.3-1.5 mg (1 mg over 3 days) Place 1 patch onto the skin every 72 hours. (Patient not taking: Reported on 2/19/2025) 4 patch 0    sertraline (ZOLOFT) 100 MG tablet Take 2 tablets (200 mg total) by mouth once daily. 180 tablet 3    VITAMIN D2 1,250 mcg (50,000 unit) capsule Take 1 capsule (50,000 Units total) by mouth every 7 days. 15 capsule 3     No current facility-administered medications for this encounter.        The following data was reviewed by: Siva Gallegos MD on 09/28/2021:                  Assessment and Plan    Diagnoses and all orders for this visit:    1. Pulmonary emphysema, unspecified emphysema type (HCC) (Primary)  Assessment & Plan:  Patient's chest x-ray with underlying emphysema, but no acute infiltrate.  He is still having significant bronchospasm and resultant fatigue.  His O2 sats are stable as noted.  Insurance did not cover Brovana but he is utilizing twice daily Pulmicort and duo nebs frequently.  We will give him another round of steroids and check routine labs given his fatigue as of his 1/23 follow-up.      Orders:  -     Procalcitonin; Future    2. Tachycardia  Assessment & Plan:  Patient has had intermittent hypotensive episodes during this acute illness as of his 1/23 follow-up.  He has low-dose irbesartan and moderate dose amlodipine on board.  We added very low-dose metoprolol last office visit due to persistent tachycardia.  I think patient should discontinue amlodipine for now, will follow-up blood pressure on return to office.      3. Essential hypertension  Assessment & Plan:  As noted above underneath the tachycardia heading, he is still having intermittent hypotensive episodes, he was low at a clinic recently as well.  I think he needs the low-dose metoprolol that was added recently, but we will go ahead and discontinue amlodipine as of his 1/23 follow-up.  He will continue low-dose irbesartan for the time being at least.      4. Influenza  Assessment & Plan:  Patient has completed therapy for this as of his 1/23 office visit.  No fevers and his sats are holding at rest at least.    Orders:  -     Procalcitonin; Future    5. Other fatigue  -     CBC & Differential; Future  -     Comprehensive Metabolic Panel; Future  -     TSH+Free T4; Future    Other orders  -     predniSONE (DELTASONE) 20 MG tablet; 1 tablet twice a day for 3 days, 1 tablet once a day for 3 days, 1/2 tablet daily for  4 days.  Dispense: 11 tablet; Refill: 0     --  --  Older notes:  VISIT 6/21:  NEW PT EXAM 6/20 and needs all labs; no ischemic CP; chronic CHAVEZ.  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  --  LIPIDS with  and I d/w him diet needed 6/20---> LDL was 141 in 3/21, so I started statin then. (TSH neg 6/20).  IFG = A1C 5.9---> 5.5 in 3/21.  --  LEUKOCYTOSIS is very mild, nl diff, ne g h/o, so just repeat labs later date---> wnl 3/21.  POLYCYTHEMIA = f/u on RTO as well=will get in '21---> neg 3/21.  --  COPD = [facility] CT for below---> per PULM now=we will jeimy him as of 3/21 OV since he missed his appt.  TOBACCO USE D/O = smoker 40 yrs; as above...has 9 mm RUL MASS with neg PET per his report; has f/u with PULM for repeat CT in 3 mo...I will get CT now 3/21 since he did not f/u with them as jeimy.---> CT 4/21 with stable 9 mm=f/u 6 mo.  --  MORBID NXTRYLK=527 with BMI 39.  --  BPH sxs as of 3/21 OV and I d/w he needs labs today and then call us for the results.  --  --  PSA 3.0 (3/29/21).  COLON 4/16 = 13 polyps=needs to be seen soon as of 6/20 OV, but will address BP first.---> Referral placed 1/22.  COVID rec 6/21 and will only take Thuy.  ( 12/19 after together since '04;  10 yrs and was in [employer] prior; girl is 26; other girl passed heroin OD In '16).    Follow Up   Return for Next scheduled follow up.  Patient was given instructions and counseling regarding his condition or for health maintenance advice. Please see specific information pulled into the AVS if appropriate.

## 2025-07-23 ENCOUNTER — HOSPITAL ENCOUNTER (EMERGENCY)
Facility: HOSPITAL | Age: 65
Discharge: HOME | End: 2025-07-23
Attending: EMERGENCY MEDICINE | Admitting: EMERGENCY MEDICINE
Payer: OTHER GOVERNMENT

## 2025-07-23 ENCOUNTER — APPOINTMENT (OUTPATIENT)
Dept: GENERAL RADIOLOGY | Facility: HOSPITAL | Age: 65
End: 2025-07-23
Payer: OTHER GOVERNMENT

## 2025-07-23 ENCOUNTER — APPOINTMENT (OUTPATIENT)
Facility: HOSPITAL | Age: 65
End: 2025-07-23
Payer: OTHER GOVERNMENT

## 2025-07-23 VITALS
RESPIRATION RATE: 22 BRPM | DIASTOLIC BLOOD PRESSURE: 67 MMHG | BODY MASS INDEX: 42.66 KG/M2 | OXYGEN SATURATION: 97 % | HEIGHT: 72 IN | WEIGHT: 315 LBS | HEART RATE: 93 BPM | TEMPERATURE: 99.7 F | SYSTOLIC BLOOD PRESSURE: 101 MMHG

## 2025-07-23 DIAGNOSIS — L03.115 CELLULITIS OF LEG, RIGHT: Primary | ICD-10-CM

## 2025-07-23 LAB
ALBUMIN SERPL-MCNC: 4.2 G/DL (ref 3.5–5.2)
ALBUMIN/GLOB SERPL: 1.4 G/DL
ALP SERPL-CCNC: 103 U/L (ref 39–117)
ALT SERPL W P-5'-P-CCNC: 26 U/L (ref 1–41)
ANION GAP SERPL CALCULATED.3IONS-SCNC: 13.2 MMOL/L (ref 5–15)
AST SERPL-CCNC: 22 U/L (ref 1–40)
BASOPHILS # BLD AUTO: 0.11 10*3/MM3 (ref 0–0.2)
BASOPHILS NFR BLD AUTO: 1 % (ref 0–1.5)
BH CV LOWER VASCULAR LEFT COMMON FEMORAL AUGMENT: NORMAL
BH CV LOWER VASCULAR LEFT COMMON FEMORAL COMPETENT: NORMAL
BH CV LOWER VASCULAR LEFT COMMON FEMORAL COMPRESS: NORMAL
BH CV LOWER VASCULAR LEFT COMMON FEMORAL PHASIC: NORMAL
BH CV LOWER VASCULAR LEFT COMMON FEMORAL SPONT: NORMAL
BH CV LOWER VASCULAR RIGHT COMMON FEMORAL AUGMENT: NORMAL
BH CV LOWER VASCULAR RIGHT COMMON FEMORAL COMPETENT: NORMAL
BH CV LOWER VASCULAR RIGHT COMMON FEMORAL COMPRESS: NORMAL
BH CV LOWER VASCULAR RIGHT COMMON FEMORAL PHASIC: NORMAL
BH CV LOWER VASCULAR RIGHT COMMON FEMORAL SPONT: NORMAL
BH CV LOWER VASCULAR RIGHT DISTAL FEMORAL COMPRESS: NORMAL
BH CV LOWER VASCULAR RIGHT GASTRONEMIUS COMPRESS: NORMAL
BH CV LOWER VASCULAR RIGHT GREATER SAPH AK COMPRESS: NORMAL
BH CV LOWER VASCULAR RIGHT GREATER SAPH BK COMPRESS: NORMAL
BH CV LOWER VASCULAR RIGHT LESSER SAPH COMPRESS: NORMAL
BH CV LOWER VASCULAR RIGHT MID FEMORAL AUGMENT: NORMAL
BH CV LOWER VASCULAR RIGHT MID FEMORAL COMPETENT: NORMAL
BH CV LOWER VASCULAR RIGHT MID FEMORAL COMPRESS: NORMAL
BH CV LOWER VASCULAR RIGHT MID FEMORAL PHASIC: NORMAL
BH CV LOWER VASCULAR RIGHT MID FEMORAL SPONT: NORMAL
BH CV LOWER VASCULAR RIGHT PERONEAL AUGMENT: NORMAL
BH CV LOWER VASCULAR RIGHT PERONEAL COMPETENT: NORMAL
BH CV LOWER VASCULAR RIGHT PERONEAL COMPRESS: NORMAL
BH CV LOWER VASCULAR RIGHT PERONEAL PHASIC: NORMAL
BH CV LOWER VASCULAR RIGHT PERONEAL SPONT: NORMAL
BH CV LOWER VASCULAR RIGHT POPLITEAL AUGMENT: NORMAL
BH CV LOWER VASCULAR RIGHT POPLITEAL COMPETENT: NORMAL
BH CV LOWER VASCULAR RIGHT POPLITEAL COMPRESS: NORMAL
BH CV LOWER VASCULAR RIGHT POPLITEAL PHASIC: NORMAL
BH CV LOWER VASCULAR RIGHT POPLITEAL SPONT: NORMAL
BH CV LOWER VASCULAR RIGHT POSTERIOR TIBIAL AUGMENT: NORMAL
BH CV LOWER VASCULAR RIGHT POSTERIOR TIBIAL COMPETENT: NORMAL
BH CV LOWER VASCULAR RIGHT POSTERIOR TIBIAL COMPRESS: NORMAL
BH CV LOWER VASCULAR RIGHT POSTERIOR TIBIAL PHASIC: NORMAL
BH CV LOWER VASCULAR RIGHT POSTERIOR TIBIAL SPONT: NORMAL
BH CV LOWER VASCULAR RIGHT PROFUNDA FEMORAL COMPRESS: NORMAL
BH CV LOWER VASCULAR RIGHT PROXIMAL FEMORAL COMPRESS: NORMAL
BH CV LOWER VASCULAR RIGHT SAPHENOFEMORAL JUNCTION COMPRESS: NORMAL
BH CV VAS PRELIMINARY FINDINGS SCRIPTING: 1
BILIRUB SERPL-MCNC: 0.4 MG/DL (ref 0–1.2)
BUN SERPL-MCNC: 16.3 MG/DL (ref 8–23)
BUN/CREAT SERPL: 12.4 (ref 7–25)
CALCIUM SPEC-SCNC: 9.8 MG/DL (ref 8.6–10.5)
CHLORIDE SERPL-SCNC: 99 MMOL/L (ref 98–107)
CO2 SERPL-SCNC: 21.8 MMOL/L (ref 22–29)
CREAT SERPL-MCNC: 1.31 MG/DL (ref 0.76–1.27)
D-LACTATE SERPL-SCNC: 1.6 MMOL/L (ref 0.5–2)
DEPRECATED RDW RBC AUTO: 43.6 FL (ref 37–54)
EGFRCR SERPLBLD CKD-EPI 2021: 61.5 ML/MIN/1.73
EOSINOPHIL # BLD AUTO: 0.28 10*3/MM3 (ref 0–0.4)
EOSINOPHIL NFR BLD AUTO: 2.5 % (ref 0.3–6.2)
ERYTHROCYTE [DISTWIDTH] IN BLOOD BY AUTOMATED COUNT: 13.4 % (ref 12.3–15.4)
GEN 5 1HR TROPONIN T REFLEX: 10 NG/L
GLOBULIN UR ELPH-MCNC: 3 GM/DL
GLUCOSE SERPL-MCNC: 120 MG/DL (ref 65–99)
HCT VFR BLD AUTO: 45.9 % (ref 37.5–51)
HGB BLD-MCNC: 15.9 G/DL (ref 13–17.7)
HOLD SPECIMEN: NORMAL
HOLD SPECIMEN: NORMAL
IMM GRANULOCYTES # BLD AUTO: 0.02 10*3/MM3 (ref 0–0.05)
IMM GRANULOCYTES NFR BLD AUTO: 0.2 % (ref 0–0.5)
LYMPHOCYTES # BLD AUTO: 3.67 10*3/MM3 (ref 0.7–3.1)
LYMPHOCYTES NFR BLD AUTO: 33.4 % (ref 19.6–45.3)
MCH RBC QN AUTO: 30.6 PG (ref 26.6–33)
MCHC RBC AUTO-ENTMCNC: 34.6 G/DL (ref 31.5–35.7)
MCV RBC AUTO: 88.3 FL (ref 79–97)
MONOCYTES # BLD AUTO: 0.73 10*3/MM3 (ref 0.1–0.9)
MONOCYTES NFR BLD AUTO: 6.6 % (ref 5–12)
NEUTROPHILS NFR BLD AUTO: 56.3 % (ref 42.7–76)
NEUTROPHILS NFR BLD AUTO: 6.18 10*3/MM3 (ref 1.7–7)
NRBC BLD AUTO-RTO: 0 /100 WBC (ref 0–0.2)
NT-PROBNP SERPL-MCNC: 52.4 PG/ML (ref 0–900)
PLATELET # BLD AUTO: 255 10*3/MM3 (ref 140–450)
PMV BLD AUTO: 9.3 FL (ref 6–12)
POTASSIUM SERPL-SCNC: 4 MMOL/L (ref 3.5–5.2)
PROT SERPL-MCNC: 7.2 G/DL (ref 6–8.5)
QT INTERVAL: 353 MS
QTC INTERVAL: 444 MS
RBC # BLD AUTO: 5.2 10*6/MM3 (ref 4.14–5.8)
SODIUM SERPL-SCNC: 134 MMOL/L (ref 136–145)
TROPONIN T NUMERIC DELTA: -2 NG/L
TROPONIN T SERPL HS-MCNC: 12 NG/L
WBC NRBC COR # BLD AUTO: 10.99 10*3/MM3 (ref 3.4–10.8)
WHOLE BLOOD HOLD COAG: NORMAL
WHOLE BLOOD HOLD SPECIMEN: NORMAL

## 2025-07-23 PROCEDURE — 93971 EXTREMITY STUDY: CPT

## 2025-07-23 PROCEDURE — 87040 BLOOD CULTURE FOR BACTERIA: CPT | Performed by: EMERGENCY MEDICINE

## 2025-07-23 PROCEDURE — 83605 ASSAY OF LACTIC ACID: CPT | Performed by: EMERGENCY MEDICINE

## 2025-07-23 PROCEDURE — 85025 COMPLETE CBC W/AUTO DIFF WBC: CPT | Performed by: EMERGENCY MEDICINE

## 2025-07-23 PROCEDURE — 83880 ASSAY OF NATRIURETIC PEPTIDE: CPT | Performed by: EMERGENCY MEDICINE

## 2025-07-23 PROCEDURE — 71045 X-RAY EXAM CHEST 1 VIEW: CPT

## 2025-07-23 PROCEDURE — 93971 EXTREMITY STUDY: CPT | Performed by: SURGERY

## 2025-07-23 PROCEDURE — 84484 ASSAY OF TROPONIN QUANT: CPT | Performed by: EMERGENCY MEDICINE

## 2025-07-23 PROCEDURE — 99284 EMERGENCY DEPT VISIT MOD MDM: CPT

## 2025-07-23 PROCEDURE — 93005 ELECTROCARDIOGRAM TRACING: CPT | Performed by: EMERGENCY MEDICINE

## 2025-07-23 PROCEDURE — 80053 COMPREHEN METABOLIC PANEL: CPT | Performed by: EMERGENCY MEDICINE

## 2025-07-23 PROCEDURE — 36415 COLL VENOUS BLD VENIPUNCTURE: CPT

## 2025-07-23 RX ORDER — SODIUM CHLORIDE 0.9 % (FLUSH) 0.9 %
10 SYRINGE (ML) INJECTION AS NEEDED
Status: DISCONTINUED | OUTPATIENT
Start: 2025-07-23 | End: 2025-07-24 | Stop reason: HOSPADM

## 2025-07-23 RX ORDER — CEPHALEXIN 500 MG/1
500 CAPSULE ORAL 4 TIMES DAILY
Qty: 40 CAPSULE | Refills: 0 | Status: ON HOLD | OUTPATIENT
Start: 2025-07-23

## 2025-07-23 NOTE — ED PROVIDER NOTES
Time: 7:02 PM EDT  Date of encounter:  7/23/2025  Independent Historian/Clinical History and Information was obtained by:   Patient    History is limited by: N/A    Chief Complaint: Right leg pain and swelling      History of Present Illness:  Patient is a 64 y.o. year old male who presents to the emergency department for evaluation of right lower extremity pain and swelling.      Patient Care Team  Primary Care Provider: Provider, Lisa Known    Past Medical History:     No Known Allergies  Past Medical History:   Diagnosis Date    Essential (primary) hypertension     Hypertension     Mixed hyperlipidemia     Pulmonary nodule     Secondary polycythemia      History reviewed. No pertinent surgical history.  History reviewed. No pertinent family history.    Home Medications:  Prior to Admission medications    Medication Sig Start Date End Date Taking? Authorizing Provider   albuterol (PROAIR RESPICLICK) 108 (90 Base) MCG/ACT inhaler Inhale 2 puffs Every 4 (Four) Hours As Needed for Wheezing. 10/17/22   Siva Gallegos MD   apixaban (ELIQUIS) 5 MG tablet tablet Take 1 tablet by mouth 2 (Two) Times a Day.    Ruperto Mata MD   atorvastatin (LIPITOR) 40 MG tablet Take 1 tablet by mouth Daily. 5/19/23   Siva Gallegos MD   buPROPion (WELLBUTRIN) 75 MG tablet Take 2 tablets by mouth 2 (Two) Times a Day.    Ruperto Mata MD   metoprolol tartrate (LOPRESSOR) 25 MG tablet Take 1 tablet by mouth Every 12 (Twelve) Hours for 30 days. 6/19/24 7/19/24  Tali Mancera MD   tamsulosin (FLOMAX) 0.4 MG capsule 24 hr capsule Take 2 capsules by mouth Every Night.    Ruperto Mata MD   tiotropium (SPIRIVA) 18 MCG per inhalation capsule Place 1 capsule into inhaler and inhale Daily.    Ruperto Mata MD   vitamin D (ERGOCALCIFEROL) 1.25 MG (10060 UT) capsule capsule Take 1 capsule by mouth 1 (One) Time Per Week. Saturday    Ruperto Mata MD        Social History:   Social History     Tobacco Use     "Smoking status: Every Day     Current packs/day: 2.00     Types: Cigarettes     Passive exposure: Never    Smokeless tobacco: Never   Vaping Use    Vaping status: Never Used   Substance Use Topics    Alcohol use: Yes     Alcohol/week: 3.0 standard drinks of alcohol     Types: 3 Standard drinks or equivalent per week    Drug use: Not Currently     Types: Cocaine(coke)         Review of Systems:  Review of Systems   Constitutional:  Negative for chills and fever.   HENT:  Negative for congestion, rhinorrhea and sore throat.    Eyes:  Negative for pain and visual disturbance.   Respiratory:  Negative for apnea, cough, chest tightness and shortness of breath.    Cardiovascular:  Positive for leg swelling. Negative for chest pain and palpitations.   Gastrointestinal:  Negative for abdominal pain, diarrhea, nausea and vomiting.   Genitourinary:  Negative for difficulty urinating and dysuria.   Musculoskeletal:  Negative for joint swelling and myalgias.   Skin:  Negative for color change.   Neurological:  Negative for seizures and headaches.   Psychiatric/Behavioral: Negative.     All other systems reviewed and are negative.       Physical Exam:  /75   Pulse 105   Temp 99.7 °F (37.6 °C) (Oral)   Resp 22   Ht 182.9 cm (72\")   Wt (!) 147 kg (324 lb 15.3 oz)   SpO2 94%   BMI 44.07 kg/m²     Physical Exam  Vitals and nursing note reviewed.   Constitutional:       General: He is not in acute distress.     Appearance: Normal appearance. He is not toxic-appearing.   HENT:      Head: Normocephalic and atraumatic.      Jaw: There is normal jaw occlusion.   Eyes:      General: Lids are normal.      Extraocular Movements: Extraocular movements intact.      Conjunctiva/sclera: Conjunctivae normal.      Pupils: Pupils are equal, round, and reactive to light.   Cardiovascular:      Rate and Rhythm: Normal rate and regular rhythm.      Pulses: Normal pulses.      Heart sounds: Normal heart sounds.   Pulmonary:      Effort: " Pulmonary effort is normal. No respiratory distress.      Breath sounds: Normal breath sounds. No wheezing or rhonchi.   Abdominal:      General: Abdomen is flat.      Palpations: Abdomen is soft.      Tenderness: There is no abdominal tenderness. There is no guarding or rebound.   Musculoskeletal:         General: Normal range of motion.      Cervical back: Normal range of motion and neck supple.      Right lower leg: Edema present.      Left lower leg: No edema.   Skin:     General: Skin is warm and dry.   Neurological:      Mental Status: He is alert and oriented to person, place, and time. Mental status is at baseline.   Psychiatric:         Mood and Affect: Mood normal.                    Medical Decision Making:      Comorbidities that affect care:    Hypertension, hyperlipidemia, obesity    External Notes reviewed:    None      The following orders were placed and all results were independently analyzed by me:  Orders Placed This Encounter   Procedures    Blood Culture - Blood,    Blood Culture - Blood,    XR Chest 1 View    [place] Draw    Comprehensive Metabolic Panel    BNP    High Sensitivity Troponin T    CBC Auto Differential    Lactic Acid, Plasma    High Sensitivity Troponin T 1Hr    NPO Diet NPO Type: Strict NPO    Undress & Gown    Vital Signs    Undress & Gown    Continuous Pulse Oximetry    Vital Signs    Oxygen Therapy- Nasal Cannula; Titrate 1-6 LPM Per SpO2; 90 - 95%    Oxygen Therapy- Nasal Cannula; Titrate 1-6 LPM Per SpO2; 90 - 95%    ECG 12 Lead ED Triage Standing Order; SOA    Insert Peripheral IV    Insert Peripheral IV    CBC & Differential    Green Top (Gel)    Lavender Top    Gold Top - SST    Light Blue Top       Medications Given in the Emergency Department:  Medications   sodium chloride 0.9 % flush 10 mL (has no administration in time range)   sodium chloride 0.9 % flush 10 mL (has no administration in time range)        ED Course:         Labs:    Lab Results (last 24 hours)        Procedure Component Value Units Date/Time    CBC & Differential [[id number]]  (Abnormal) Collected: 07/23/25 1824    Specimen: Blood Updated: 07/23/25 1834    Narrative:      The following orders were created for panel order CBC & Differential.  Procedure                               Abnormality         Status                     ---------                               -----------         ------                     CBC Auto Differential[[id number]]        Abnormal            Final result                 Please view results for these tests on the individual orders.    Comprehensive Metabolic Panel [[id number]]  (Abnormal) Collected: 07/23/25 1824    Specimen: Blood Updated: 07/23/25 1911     Glucose 120 mg/dL      BUN 16.3 mg/dL      Creatinine 1.31 mg/dL      Sodium 134 mmol/L      Potassium 4.0 mmol/L      Comment: Slight hemolysis detected by analyzer. Result may be falsely elevated.        Chloride 99 mmol/L      CO2 21.8 mmol/L      Calcium 9.8 mg/dL      Total Protein 7.2 g/dL      Albumin 4.2 g/dL      ALT (SGPT) 26 U/L      AST (SGOT) 22 U/L      Alkaline Phosphatase 103 U/L      Total Bilirubin 0.4 mg/dL      Globulin 3.0 gm/dL      A/G Ratio 1.4 g/dL      BUN/Creatinine Ratio 12.4     Anion Gap 13.2 mmol/L      eGFR 61.5 mL/min/1.73     Narrative:      GFR Categories in Chronic Kidney Disease (CKD)              GFR Category          GFR (mL/min/1.73)    Interpretation  G1                    90 or greater        Normal or high (1)  G2                    60-89                Mild decrease (1)  G3a                   45-59                Mild to moderate decrease  G3b                   30-44                Moderate to severe decrease  G4                    15-29                Severe decrease  G5                    14 or less           Kidney failure    (1)In the absence of evidence of kidney disease, neither GFR category G1 or G2 fulfill the criteria for CKD.    eGFR calculation 2021 CKD-EPI creatinine equation,  which does not include race as a factor    BNP [[id number]]  (Normal) Collected: 07/23/25 1824    Specimen: Blood Updated: 07/23/25 1856     proBNP 52.4 pg/mL     Narrative:      This assay is used as an aid in the diagnosis of individuals suspected of having heart failure. It can be used as an aid in the diagnosis of acute decompensated heart failure (ADHF) in patients presenting with signs and symptoms of ADHF to the emergency department (ED). In addition, NT-proBNP of <300 pg/mL indicates ADHF is not likely.    Age Range Result Interpretation  NT-proBNP Concentration (pg/mL:      <50             Positive            >450                   Gray                 300-450                    Negative             <300    50-75           Positive            >900                  Gray                300-900                  Negative            <300      >75             Positive            >1800                  Gray                300-1800                  Negative            <300    High Sensitivity Troponin T [[id number]]  (Normal) Collected: 07/23/25 1824    Specimen: Blood Updated: 07/23/25 1911     HS Troponin T 12 ng/L     Narrative:      High Sensitive Troponin T Reference Range:  <14.0 ng/L- Negative Female for AMI  <22.0 ng/L- Negative Male for AMI  >=14 - Abnormal Female indicating possible myocardial injury.  >=22 - Abnormal Male indicating possible myocardial injury.   Clinicians would have to utilize clinical acumen, EKG, Troponin, and serial changes to determine if it is an Acute Myocardial Infarction or myocardial injury due to an underlying chronic condition.         CBC Auto Differential [[id number]]  (Abnormal) Collected: 07/23/25 1824    Specimen: Blood Updated: 07/23/25 1834     WBC 10.99 10*3/mm3      RBC 5.20 10*6/mm3      Hemoglobin 15.9 g/dL      Hematocrit 45.9 %      MCV 88.3 fL      MCH 30.6 pg      MCHC 34.6 g/dL      RDW 13.4 %      RDW-SD 43.6 fl      MPV 9.3 fL      Platelets 255 10*3/mm3       Neutrophil % 56.3 %      Lymphocyte % 33.4 %      Monocyte % 6.6 %      Eosinophil % 2.5 %      Basophil % 1.0 %      Immature Grans % 0.2 %      Neutrophils, Absolute 6.18 10*3/mm3      Lymphocytes, Absolute 3.67 10*3/mm3      Monocytes, Absolute 0.73 10*3/mm3      Eosinophils, Absolute 0.28 10*3/mm3      Basophils, Absolute 0.11 10*3/mm3      Immature Grans, Absolute 0.02 10*3/mm3      nRBC 0.0 /100 WBC     Lactic Acid, Plasma [[id number]]  (Normal) Collected: 07/23/25 1840    Specimen: Blood from Arm, Left Updated: 07/23/25 1901     Lactate 1.6 mmol/L     Blood Culture - Blood, Arm, Left [[id number]] Collected: 07/23/25 1840    Specimen: Blood from Arm, Left Updated: 07/23/25 1843    Blood Culture - Blood, Arm, Right [[id number]] Collected: 07/23/25 1840    Specimen: Blood from Arm, Right Updated: 07/23/25 1843    High Sensitivity Troponin T 1Hr [[id number]]  (Normal) Collected: 07/23/25 2006    Specimen: Blood Updated: 07/23/25 2029     HS Troponin T 10 ng/L      Troponin T Numeric Delta -2 ng/L     Narrative:      High Sensitive Troponin T Reference Range:  <14.0 ng/L- Negative Female for AMI  <22.0 ng/L- Negative Male for AMI  >=14 - Abnormal Female indicating possible myocardial injury.  >=22 - Abnormal Male indicating possible myocardial injury.   Clinicians would have to utilize clinical acumen, EKG, Troponin, and serial changes to determine if it is an Acute Myocardial Infarction or myocardial injury due to an underlying chronic condition.                  Imaging:    Duplex Venous Lower Extremity - Right CAR  Result Date: 7/23/2025    Normal right lower extremity venous duplex scan.     XR Chest 1 View  Result Date: 7/23/2025  XR CHEST 1 VW Date of Exam: 7/23/2025 6:31 PM EDT Indication: SOA Triage Protocol Comparison: Chest radiograph 7/12/2024 Findings: Heart size normal. Negative for lobar consolidation, edema, effusion or pneumothorax. Degenerative elated osseous change.     Impression: No active  pulmonary process. Electronically Signed: Jericho Berry MD  7/23/2025 7:11 PM EDT  Workstation ID: [id number]        Differential Diagnosis and Discussion:    Extremity Pain: Differential diagnosis includes but is not limited to soft tissue sprain, tendonitis, tendon injury, dislocation, fracture, deep vein thrombosis, arterial insufficiency, osteoarthritis, bursitis, and ligamentous damage.    PROCEDURES:    Labs were collected in the emergency department and all labs were reviewed and interpreted by me.  X-ray were performed in the emergency department and all X-ray impressions were independently interpreted by me.  An EKG was performed and the EKG was interpreted by me.  Ultrasound was performed in the emergency department and the ultrasound impression was interpreted by me.     ECG 12 Lead ED Triage Standing Order; SOA   Preliminary Result   HEART RATE=96  bpm   RR Tloeghcy=076  ms   OK Hudvwgfs=280  ms   P Horizontal Axis=-3  deg   P Front Axis=54  deg   QRSD Wytbdvty=966  ms   QT Aetnjftk=556  ms   YFzW=831  ms   QRS Axis=5  deg   T Wave Axis=53  deg   - OTHERWISE NORMAL ECG -   Sinus rhythm   RSR' in V1 or V2, right VCD or RVH   Date and Time of Study:2025-07-23 18:21:23        My Interpretation of the EKG shows normal sinus rhythm, normal rate, normal QT, no acute ischemia    Procedures    MDM  Number of Diagnoses or Management Options  Cellulitis of leg, right  Diagnosis management comments: In this is a 64-year-old male who presents for evaluation of right lower extremity pain and swelling.  Doppler ultrasound reviewed by me and discussed with vascular ultrasound tech is negative for DVT.  CBC independently reviewed and interpreted by me and shows no critical abnormalities.  CMP independently reviewed and interpreted by me and shows no critical abnormalities.  Patient is otherwise well-appearing in no acute distress.  Prescription antibiotics given.  Very strict return to ER and follow-up instructions have  been provided to the patient.                         Patient Care Considerations:    SEPSIS was considered but is NOT present in the emergency department as SIRS criteria is not present.      Consultants/Shared Management Plan:    None    Social Determinants of Health:    Patient is independent, reliable, and has access to care.       Disposition and Care Coordination:    Discharged: The patient is suitable and stable for discharge with no need for consideration of admission.    I have explained the patient´s condition, diagnoses and treatment plan based on the information available to me at this time. I have answered questions and addressed any concerns. The patient has a good  understanding of the patient´s diagnosis, condition, and treatment plan as can be expected at this point. The vital signs have been stable. The patient´s condition is stable and appropriate for discharge from the emergency department.      The patient will pursue further outpatient evaluation with the primary care physician or other designated or consulting physician as outlined in the discharge instructions. They are agreeable to this plan of care and follow-up instructions have been explained in detail. The patient has received these instructions in written format and has expressed an understanding of the discharge instructions. The patient is aware that any significant change in condition or worsening of symptoms should prompt an immediate return to this or the closest emergency department or call to 911.  I have explained discharge medications and the need for follow up with the patient/caretakers. This was also printed in the discharge instructions. Patient was discharged with the following medications and follow up:      Medication List        New Prescriptions      cephalexin 500 MG capsule  Commonly known as: KEFLEX  Take 1 capsule by mouth 4 (Four) Times a Day.               Where to Get Your Medications        These medications  were sent to [facility]/pharmacy #[id number] - [place], [place] - [address] - [phone]  - [phone] FX  [address] [place] [address]      Hours: 24-hours Phone: [phone]   cephalexin 500 MG capsule      No follow-up provider specified.     Final diagnoses:   Cellulitis of leg, right        ED Disposition       ED Disposition   Discharge    Condition   Stable    Comment   --               This medical record created using voice recognition software.             Harley Natarajan MD  07/23/25 [id number]

## 2025-07-28 LAB
BACTERIA SPEC AEROBE CULT: NORMAL
BACTERIA SPEC AEROBE CULT: NORMAL

## 2025-08-04 ENCOUNTER — APPOINTMENT (OUTPATIENT)
Dept: GENERAL RADIOLOGY | Facility: HOSPITAL | Age: 65
DRG: 286 | End: 2025-08-04
Payer: OTHER GOVERNMENT

## 2025-08-04 ENCOUNTER — APPOINTMENT (OUTPATIENT)
Dept: CT IMAGING | Facility: HOSPITAL | Age: 65
DRG: 286 | End: 2025-08-04
Payer: OTHER GOVERNMENT

## 2025-08-04 ENCOUNTER — HOSPITAL ENCOUNTER (INPATIENT)
Facility: HOSPITAL | Age: 65
LOS: 9 days | Discharge: HOME | DRG: 286 | End: 2025-08-13
Attending: EMERGENCY MEDICINE | Admitting: INTERNAL MEDICINE
Payer: OTHER GOVERNMENT

## 2025-08-04 DIAGNOSIS — R91.1 LUNG NODULE: ICD-10-CM

## 2025-08-04 DIAGNOSIS — I48.91 ATRIAL FIBRILLATION, UNSPECIFIED TYPE: Primary | ICD-10-CM

## 2025-08-04 DIAGNOSIS — R94.39 ABNORMAL NUCLEAR STRESS TEST: ICD-10-CM

## 2025-08-04 DIAGNOSIS — R26.2 DIFFICULTY IN WALKING: ICD-10-CM

## 2025-08-04 DIAGNOSIS — I48.19 PERSISTENT ATRIAL FIBRILLATION: ICD-10-CM

## 2025-08-04 DIAGNOSIS — I50.31 ACUTE DIASTOLIC CHF (CONGESTIVE HEART FAILURE): ICD-10-CM

## 2025-08-04 LAB
ALBUMIN SERPL-MCNC: 3.9 G/DL (ref 3.5–5.2)
ALBUMIN/GLOB SERPL: 1.3 G/DL
ALP SERPL-CCNC: 103 U/L (ref 39–117)
ALT SERPL W P-5'-P-CCNC: 23 U/L (ref 1–41)
AMPHET+METHAMPHET UR QL: NEGATIVE
AMPHETAMINES UR QL: NEGATIVE
ANION GAP SERPL CALCULATED.3IONS-SCNC: 11.2 MMOL/L (ref 5–15)
AST SERPL-CCNC: 18 U/L (ref 1–40)
BARBITURATES UR QL SCN: NEGATIVE
BASOPHILS # BLD AUTO: 0.1 10*3/MM3 (ref 0–0.2)
BASOPHILS NFR BLD AUTO: 0.9 % (ref 0–1.5)
BENZODIAZ UR QL SCN: NEGATIVE
BILIRUB SERPL-MCNC: 0.5 MG/DL (ref 0–1.2)
BILIRUB UR QL STRIP: NEGATIVE
BUN SERPL-MCNC: 15.1 MG/DL (ref 8–23)
BUN/CREAT SERPL: 11.1 (ref 7–25)
BUPRENORPHINE SERPL-MCNC: NEGATIVE NG/ML
CALCIUM SPEC-SCNC: 9.8 MG/DL (ref 8.6–10.5)
CANNABINOIDS SERPL QL: NEGATIVE
CHLORIDE SERPL-SCNC: 103 MMOL/L (ref 98–107)
CLARITY UR: CLEAR
CO2 SERPL-SCNC: 22.8 MMOL/L (ref 22–29)
COCAINE UR QL: NEGATIVE
COLOR UR: YELLOW
CREAT SERPL-MCNC: 1.36 MG/DL (ref 0.76–1.27)
D-LACTATE SERPL-SCNC: 1.1 MMOL/L (ref 0.5–2)
DEPRECATED RDW RBC AUTO: 43.8 FL (ref 37–54)
EGFRCR SERPLBLD CKD-EPI 2021: 58.8 ML/MIN/1.73
EOSINOPHIL # BLD AUTO: 0.21 10*3/MM3 (ref 0–0.4)
EOSINOPHIL NFR BLD AUTO: 1.9 % (ref 0.3–6.2)
ERYTHROCYTE [DISTWIDTH] IN BLOOD BY AUTOMATED COUNT: 13.6 % (ref 12.3–15.4)
FENTANYL UR-MCNC: NEGATIVE NG/ML
GEN 5 1HR TROPONIN T REFLEX: 13 NG/L
GLOBULIN UR ELPH-MCNC: 3 GM/DL
GLUCOSE SERPL-MCNC: 113 MG/DL (ref 65–99)
GLUCOSE UR STRIP-MCNC: NEGATIVE MG/DL
HCT VFR BLD AUTO: 45.2 % (ref 37.5–51)
HGB BLD-MCNC: 15.8 G/DL (ref 13–17.7)
HGB UR QL STRIP.AUTO: NEGATIVE
HOLD SPECIMEN: NORMAL
HOLD SPECIMEN: NORMAL
IMM GRANULOCYTES # BLD AUTO: 0.03 10*3/MM3 (ref 0–0.05)
IMM GRANULOCYTES NFR BLD AUTO: 0.3 % (ref 0–0.5)
KETONES UR QL STRIP: NEGATIVE
LEUKOCYTE ESTERASE UR QL STRIP.AUTO: NEGATIVE
LIPASE SERPL-CCNC: 54 U/L (ref 13–60)
LYMPHOCYTES # BLD AUTO: 3.31 10*3/MM3 (ref 0.7–3.1)
LYMPHOCYTES NFR BLD AUTO: 30 % (ref 19.6–45.3)
MAGNESIUM SERPL-MCNC: 1.8 MG/DL (ref 1.6–2.4)
MCH RBC QN AUTO: 30.7 PG (ref 26.6–33)
MCHC RBC AUTO-ENTMCNC: 35 G/DL (ref 31.5–35.7)
MCV RBC AUTO: 87.8 FL (ref 79–97)
METHADONE UR QL SCN: NEGATIVE
MONOCYTES # BLD AUTO: 0.53 10*3/MM3 (ref 0.1–0.9)
MONOCYTES NFR BLD AUTO: 4.8 % (ref 5–12)
NEUTROPHILS NFR BLD AUTO: 6.84 10*3/MM3 (ref 1.7–7)
NEUTROPHILS NFR BLD AUTO: 62.1 % (ref 42.7–76)
NITRITE UR QL STRIP: NEGATIVE
NRBC BLD AUTO-RTO: 0 /100 WBC (ref 0–0.2)
NT-PROBNP SERPL-MCNC: 3533 PG/ML (ref 0–900)
OPIATES UR QL: NEGATIVE
OXYCODONE UR QL SCN: NEGATIVE
PCP UR QL SCN: NEGATIVE
PH UR STRIP.AUTO: 6 [PH] (ref 5–8)
PLATELET # BLD AUTO: 276 10*3/MM3 (ref 140–450)
PMV BLD AUTO: 9.2 FL (ref 6–12)
POTASSIUM SERPL-SCNC: 4.1 MMOL/L (ref 3.5–5.2)
PROT SERPL-MCNC: 6.9 G/DL (ref 6–8.5)
PROT UR QL STRIP: NEGATIVE
RBC # BLD AUTO: 5.15 10*6/MM3 (ref 4.14–5.8)
SODIUM SERPL-SCNC: 137 MMOL/L (ref 136–145)
SP GR UR STRIP: >1.03 (ref 1–1.03)
TRICYCLICS UR QL SCN: NEGATIVE
TROPONIN T NUMERIC DELTA: 1 NG/L
TROPONIN T SERPL HS-MCNC: 12 NG/L
UROBILINOGEN UR QL STRIP: ABNORMAL
WBC NRBC COR # BLD AUTO: 11.02 10*3/MM3 (ref 3.4–10.8)
WHOLE BLOOD HOLD COAG: NORMAL
WHOLE BLOOD HOLD SPECIMEN: NORMAL

## 2025-08-04 PROCEDURE — 80307 DRUG TEST PRSMV CHEM ANLYZR: CPT | Performed by: INTERNAL MEDICINE

## 2025-08-04 PROCEDURE — 99222 1ST HOSP IP/OBS MODERATE 55: CPT | Performed by: INTERNAL MEDICINE

## 2025-08-04 PROCEDURE — 25010000002 DILTIAZEM 25 MG/5ML SOLUTION: Performed by: EMERGENCY MEDICINE

## 2025-08-04 PROCEDURE — 85025 COMPLETE CBC W/AUTO DIFF WBC: CPT | Performed by: EMERGENCY MEDICINE

## 2025-08-04 PROCEDURE — 99291 CRITICAL CARE FIRST HOUR: CPT

## 2025-08-04 PROCEDURE — 25010000002 AMIODARONE IN DEXTROSE 5% 360-4.14 MG/200ML-% SOLUTION: Performed by: EMERGENCY MEDICINE

## 2025-08-04 PROCEDURE — 84484 ASSAY OF TROPONIN QUANT: CPT | Performed by: EMERGENCY MEDICINE

## 2025-08-04 PROCEDURE — 81003 URINALYSIS AUTO W/O SCOPE: CPT | Performed by: EMERGENCY MEDICINE

## 2025-08-04 PROCEDURE — 93005 ELECTROCARDIOGRAM TRACING: CPT | Performed by: EMERGENCY MEDICINE

## 2025-08-04 PROCEDURE — 71045 X-RAY EXAM CHEST 1 VIEW: CPT

## 2025-08-04 PROCEDURE — 36415 COLL VENOUS BLD VENIPUNCTURE: CPT

## 2025-08-04 PROCEDURE — 83735 ASSAY OF MAGNESIUM: CPT | Performed by: EMERGENCY MEDICINE

## 2025-08-04 PROCEDURE — 25010000002 AMIODARONE IN DEXTROSE 5% 150-4.21 MG/100ML-% SOLUTION: Performed by: EMERGENCY MEDICINE

## 2025-08-04 PROCEDURE — 93005 ELECTROCARDIOGRAM TRACING: CPT

## 2025-08-04 PROCEDURE — 25510000001 IOPAMIDOL PER 1 ML: Performed by: EMERGENCY MEDICINE

## 2025-08-04 PROCEDURE — 71275 CT ANGIOGRAPHY CHEST: CPT

## 2025-08-04 PROCEDURE — 25010000002 METOPROLOL TARTRATE: Performed by: EMERGENCY MEDICINE

## 2025-08-04 PROCEDURE — 83880 ASSAY OF NATRIURETIC PEPTIDE: CPT | Performed by: EMERGENCY MEDICINE

## 2025-08-04 PROCEDURE — 93010 ELECTROCARDIOGRAM REPORT: CPT | Performed by: INTERNAL MEDICINE

## 2025-08-04 PROCEDURE — 80053 COMPREHEN METABOLIC PANEL: CPT | Performed by: EMERGENCY MEDICINE

## 2025-08-04 PROCEDURE — 83605 ASSAY OF LACTIC ACID: CPT | Performed by: EMERGENCY MEDICINE

## 2025-08-04 PROCEDURE — 83690 ASSAY OF LIPASE: CPT | Performed by: EMERGENCY MEDICINE

## 2025-08-04 RX ORDER — SODIUM CHLORIDE 0.9 % (FLUSH) 0.9 %
10 SYRINGE (ML) INJECTION AS NEEDED
Status: DISCONTINUED | OUTPATIENT
Start: 2025-08-04 | End: 2025-08-08

## 2025-08-04 RX ORDER — SODIUM CHLORIDE 9 MG/ML
40 INJECTION, SOLUTION INTRAVENOUS AS NEEDED
Status: DISCONTINUED | OUTPATIENT
Start: 2025-08-04 | End: 2025-08-08

## 2025-08-04 RX ORDER — IOPAMIDOL 755 MG/ML
100 INJECTION, SOLUTION INTRAVASCULAR
Status: COMPLETED | OUTPATIENT
Start: 2025-08-04 | End: 2025-08-04

## 2025-08-04 RX ORDER — SODIUM CHLORIDE 0.9 % (FLUSH) 0.9 %
10 SYRINGE (ML) INJECTION EVERY 12 HOURS SCHEDULED
Status: DISCONTINUED | OUTPATIENT
Start: 2025-08-04 | End: 2025-08-08

## 2025-08-04 RX ORDER — DILTIAZEM HYDROCHLORIDE 5 MG/ML
20 INJECTION INTRAVENOUS ONCE
Status: COMPLETED | OUTPATIENT
Start: 2025-08-04 | End: 2025-08-04

## 2025-08-04 RX ADMIN — Medication 10 ML: at 21:56

## 2025-08-04 RX ADMIN — METOPROLOL TARTRATE 10 MG: 1 INJECTION, SOLUTION INTRAVENOUS at 17:27

## 2025-08-04 RX ADMIN — AMIODARONE HYDROCHLORIDE 1 MG/MIN: 1.8 INJECTION, SOLUTION INTRAVENOUS at 18:58

## 2025-08-04 RX ADMIN — AMIODARONE HYDROCHLORIDE 150 MG: 1.5 INJECTION, SOLUTION INTRAVENOUS at 18:48

## 2025-08-04 RX ADMIN — IOPAMIDOL 88 ML: 755 INJECTION, SOLUTION INTRAVENOUS at 16:36

## 2025-08-04 RX ADMIN — DILTIAZEM HYDROCHLORIDE 20 MG: 5 INJECTION, SOLUTION INTRAVENOUS at 15:18

## 2025-08-05 ENCOUNTER — APPOINTMENT (OUTPATIENT)
Dept: CARDIOLOGY | Facility: HOSPITAL | Age: 65
DRG: 286 | End: 2025-08-05
Payer: OTHER GOVERNMENT

## 2025-08-05 LAB
ALBUMIN SERPL-MCNC: 3.7 G/DL (ref 3.5–5.2)
ALBUMIN/GLOB SERPL: 1.4 G/DL
ALP SERPL-CCNC: 92 U/L (ref 39–117)
ALT SERPL W P-5'-P-CCNC: 18 U/L (ref 1–41)
ANION GAP SERPL CALCULATED.3IONS-SCNC: 11.4 MMOL/L (ref 5–15)
AST SERPL-CCNC: 14 U/L (ref 1–40)
BASOPHILS # BLD AUTO: 0.12 10*3/MM3 (ref 0–0.2)
BASOPHILS NFR BLD AUTO: 1.2 % (ref 0–1.5)
BILIRUB SERPL-MCNC: 0.4 MG/DL (ref 0–1.2)
BUN SERPL-MCNC: 16.2 MG/DL (ref 8–23)
BUN/CREAT SERPL: 12.5 (ref 7–25)
CALCIUM SPEC-SCNC: 9.3 MG/DL (ref 8.6–10.5)
CHLORIDE SERPL-SCNC: 104 MMOL/L (ref 98–107)
CO2 SERPL-SCNC: 21.6 MMOL/L (ref 22–29)
CREAT SERPL-MCNC: 1.3 MG/DL (ref 0.76–1.27)
DEPRECATED RDW RBC AUTO: 44.5 FL (ref 37–54)
EGFRCR SERPLBLD CKD-EPI 2021: 62.1 ML/MIN/1.73
EOSINOPHIL # BLD AUTO: 0.22 10*3/MM3 (ref 0–0.4)
EOSINOPHIL NFR BLD AUTO: 2.1 % (ref 0.3–6.2)
ERYTHROCYTE [DISTWIDTH] IN BLOOD BY AUTOMATED COUNT: 13.5 % (ref 12.3–15.4)
GLOBULIN UR ELPH-MCNC: 2.7 GM/DL
GLUCOSE SERPL-MCNC: 104 MG/DL (ref 65–99)
HCT VFR BLD AUTO: 43.3 % (ref 37.5–51)
HGB BLD-MCNC: 14.8 G/DL (ref 13–17.7)
IMM GRANULOCYTES # BLD AUTO: 0.03 10*3/MM3 (ref 0–0.05)
IMM GRANULOCYTES NFR BLD AUTO: 0.3 % (ref 0–0.5)
LYMPHOCYTES # BLD AUTO: 3.7 10*3/MM3 (ref 0.7–3.1)
LYMPHOCYTES NFR BLD AUTO: 36.1 % (ref 19.6–45.3)
MAGNESIUM SERPL-MCNC: 2 MG/DL (ref 1.6–2.4)
MCH RBC QN AUTO: 30.7 PG (ref 26.6–33)
MCHC RBC AUTO-ENTMCNC: 34.2 G/DL (ref 31.5–35.7)
MCV RBC AUTO: 89.8 FL (ref 79–97)
MONOCYTES # BLD AUTO: 0.75 10*3/MM3 (ref 0.1–0.9)
MONOCYTES NFR BLD AUTO: 7.3 % (ref 5–12)
NEUTROPHILS NFR BLD AUTO: 5.42 10*3/MM3 (ref 1.7–7)
NEUTROPHILS NFR BLD AUTO: 53 % (ref 42.7–76)
NRBC BLD AUTO-RTO: 0 /100 WBC (ref 0–0.2)
PLATELET # BLD AUTO: 226 10*3/MM3 (ref 140–450)
PMV BLD AUTO: 9.6 FL (ref 6–12)
POTASSIUM SERPL-SCNC: 3.7 MMOL/L (ref 3.5–5.2)
PROT SERPL-MCNC: 6.4 G/DL (ref 6–8.5)
RBC # BLD AUTO: 4.82 10*6/MM3 (ref 4.14–5.8)
SODIUM SERPL-SCNC: 137 MMOL/L (ref 136–145)
WBC NRBC COR # BLD AUTO: 10.24 10*3/MM3 (ref 3.4–10.8)

## 2025-08-05 PROCEDURE — 99222 1ST HOSP IP/OBS MODERATE 55: CPT | Performed by: INTERNAL MEDICINE

## 2025-08-05 PROCEDURE — 93306 TTE W/DOPPLER COMPLETE: CPT | Performed by: INTERNAL MEDICINE

## 2025-08-05 PROCEDURE — 25010000002 AMIODARONE IN DEXTROSE 5% 360-4.14 MG/200ML-% SOLUTION: Performed by: INTERNAL MEDICINE

## 2025-08-05 PROCEDURE — 85025 COMPLETE CBC W/AUTO DIFF WBC: CPT | Performed by: INTERNAL MEDICINE

## 2025-08-05 PROCEDURE — 93306 TTE W/DOPPLER COMPLETE: CPT

## 2025-08-05 PROCEDURE — 80053 COMPREHEN METABOLIC PANEL: CPT | Performed by: INTERNAL MEDICINE

## 2025-08-05 PROCEDURE — 83735 ASSAY OF MAGNESIUM: CPT | Performed by: INTERNAL MEDICINE

## 2025-08-05 PROCEDURE — 25010000002 DIGOXIN PER 500 MCG: Performed by: INTERNAL MEDICINE

## 2025-08-05 PROCEDURE — 99232 SBSQ HOSP IP/OBS MODERATE 35: CPT | Performed by: INTERNAL MEDICINE

## 2025-08-05 RX ORDER — ALBUTEROL SULFATE 90 UG/1
2 INHALANT RESPIRATORY (INHALATION) EVERY 6 HOURS PRN
COMMUNITY

## 2025-08-05 RX ORDER — BUPROPION HYDROCHLORIDE 150 MG/1
150 TABLET, EXTENDED RELEASE ORAL 2 TIMES DAILY
COMMUNITY

## 2025-08-05 RX ORDER — POTASSIUM CHLORIDE 750 MG/1
40 CAPSULE, EXTENDED RELEASE ORAL ONCE
Status: COMPLETED | OUTPATIENT
Start: 2025-08-05 | End: 2025-08-05

## 2025-08-05 RX ORDER — METOPROLOL TARTRATE 25 MG/1
25 TABLET, FILM COATED ORAL
Status: DISCONTINUED | OUTPATIENT
Start: 2025-08-06 | End: 2025-08-05

## 2025-08-05 RX ORDER — FLUTICASONE PROPIONATE 50 MCG
2 SPRAY, SUSPENSION (ML) NASAL DAILY PRN
COMMUNITY

## 2025-08-05 RX ORDER — TAMSULOSIN HYDROCHLORIDE 0.4 MG/1
0.8 CAPSULE ORAL DAILY
Status: DISCONTINUED | OUTPATIENT
Start: 2025-08-05 | End: 2025-08-13 | Stop reason: HOSPADM

## 2025-08-05 RX ORDER — DULOXETIN HYDROCHLORIDE 30 MG/1
90 CAPSULE, DELAYED RELEASE ORAL DAILY
COMMUNITY
End: 2025-08-13 | Stop reason: HOSPADM

## 2025-08-05 RX ORDER — ATORVASTATIN CALCIUM 40 MG/1
40 TABLET, FILM COATED ORAL NIGHTLY
Status: DISCONTINUED | OUTPATIENT
Start: 2025-08-05 | End: 2025-08-13 | Stop reason: HOSPADM

## 2025-08-05 RX ORDER — METOPROLOL TARTRATE 25 MG/1
25 TABLET, FILM COATED ORAL EVERY 8 HOURS SCHEDULED
Status: DISCONTINUED | OUTPATIENT
Start: 2025-08-05 | End: 2025-08-06

## 2025-08-05 RX ORDER — METOPROLOL TARTRATE 25 MG/1
25 TABLET, FILM COATED ORAL EVERY 12 HOURS SCHEDULED
Status: DISCONTINUED | OUTPATIENT
Start: 2025-08-05 | End: 2025-08-05

## 2025-08-05 RX ORDER — DIGOXIN 0.25 MG/ML
500 INJECTION INTRAMUSCULAR; INTRAVENOUS ONCE
Status: COMPLETED | OUTPATIENT
Start: 2025-08-05 | End: 2025-08-05

## 2025-08-05 RX ORDER — SILDENAFIL 100 MG/1
100 TABLET, FILM COATED ORAL DAILY PRN
COMMUNITY

## 2025-08-05 RX ORDER — LORATADINE 10 MG/1
10 TABLET ORAL DAILY PRN
COMMUNITY

## 2025-08-05 RX ORDER — BUPROPION HYDROCHLORIDE 100 MG/1
150 TABLET ORAL 2 TIMES DAILY
Status: DISCONTINUED | OUTPATIENT
Start: 2025-08-05 | End: 2025-08-13 | Stop reason: HOSPADM

## 2025-08-05 RX ADMIN — METOPROLOL TARTRATE 25 MG: 25 TABLET, FILM COATED ORAL at 21:06

## 2025-08-05 RX ADMIN — APIXABAN 5 MG: 5 TABLET, FILM COATED ORAL at 11:18

## 2025-08-05 RX ADMIN — METOPROLOL TARTRATE 25 MG: 25 TABLET, FILM COATED ORAL at 11:18

## 2025-08-05 RX ADMIN — Medication 10 ML: at 11:19

## 2025-08-05 RX ADMIN — Medication 10 ML: at 21:08

## 2025-08-05 RX ADMIN — BUPROPION HYDROCHLORIDE 150 MG: 100 TABLET, FILM COATED ORAL at 21:06

## 2025-08-05 RX ADMIN — ATORVASTATIN CALCIUM 40 MG: 40 TABLET, FILM COATED ORAL at 21:06

## 2025-08-05 RX ADMIN — AMIODARONE HYDROCHLORIDE 0.5 MG/MIN: 1.8 INJECTION, SOLUTION INTRAVENOUS at 01:03

## 2025-08-05 RX ADMIN — BUPROPION HYDROCHLORIDE 150 MG: 100 TABLET, FILM COATED ORAL at 12:34

## 2025-08-05 RX ADMIN — POTASSIUM CHLORIDE 40 MEQ: 750 CAPSULE, EXTENDED RELEASE ORAL at 11:18

## 2025-08-05 RX ADMIN — APIXABAN 5 MG: 5 TABLET, FILM COATED ORAL at 21:08

## 2025-08-05 RX ADMIN — DIGOXIN 500 MCG: 250 INJECTION, SOLUTION INTRAMUSCULAR; INTRAVENOUS; PARENTERAL at 11:18

## 2025-08-05 RX ADMIN — AMIODARONE HYDROCHLORIDE 0.5 MG/MIN: 1.8 INJECTION, SOLUTION INTRAVENOUS at 19:25

## 2025-08-05 RX ADMIN — TAMSULOSIN HYDROCHLORIDE 0.8 MG: 0.4 CAPSULE ORAL at 12:34

## 2025-08-05 RX ADMIN — AMIODARONE HYDROCHLORIDE 0.5 MG/MIN: 1.8 INJECTION, SOLUTION INTRAVENOUS at 13:12

## 2025-08-06 ENCOUNTER — APPOINTMENT (OUTPATIENT)
Dept: NUCLEAR MEDICINE | Facility: HOSPITAL | Age: 65
DRG: 286 | End: 2025-08-06
Payer: OTHER GOVERNMENT

## 2025-08-06 LAB
ANION GAP SERPL CALCULATED.3IONS-SCNC: 9.6 MMOL/L (ref 5–15)
AORTIC DIMENSIONLESS INDEX: 0.94 (DI)
AV MEAN PRESS GRAD SYS DOP V1V2: 2.2 MMHG
AV VMAX SYS DOP: 100 CM/SEC
BH CV ECHO MEAS - AO MAX PG: 4 MMHG
BH CV ECHO MEAS - AO ROOT DIAM: 3.9 CM
BH CV ECHO MEAS - AO V2 VTI: 14.5 CM
BH CV ECHO MEAS - AVA(I,D): 3 CM2
BH CV ECHO MEAS - EDV(MOD-SP2): 56.2 ML
BH CV ECHO MEAS - EDV(MOD-SP4): 76.4 ML
BH CV ECHO MEAS - EF(MOD-SP2): 60.5 %
BH CV ECHO MEAS - EF(MOD-SP4): 51.6 %
BH CV ECHO MEAS - ESV(MOD-SP2): 22.2 ML
BH CV ECHO MEAS - ESV(MOD-SP4): 37 ML
BH CV ECHO MEAS - IVS/LVPW: 0.65 CM
BH CV ECHO MEAS - IVSD: 2.1 CM
BH CV ECHO MEAS - LA DIMENSION: 3.2 CM
BH CV ECHO MEAS - LAT PEAK E' VEL: 6.6 CM/SEC
BH CV ECHO MEAS - LV DIASTOLIC VOL/BSA (35-75): 29.1 CM2
BH CV ECHO MEAS - LV MAX PG: 3.2 MMHG
BH CV ECHO MEAS - LV MEAN PG: 1.2 MMHG
BH CV ECHO MEAS - LV SYSTOLIC VOL/BSA (12-30): 14.1 CM2
BH CV ECHO MEAS - LV V1 MAX: 90 CM/SEC
BH CV ECHO MEAS - LV V1 VTI: 13.7 CM
BH CV ECHO MEAS - LVIDD: 3.4 CM
BH CV ECHO MEAS - LVIDS: 4.1 CM
BH CV ECHO MEAS - LVOT AREA: 3.1 CM2
BH CV ECHO MEAS - LVOT DIAM: 2 CM
BH CV ECHO MEAS - LVPWD: 3.3 CM
BH CV ECHO MEAS - MED PEAK E' VEL: 14 CM/SEC
BH CV ECHO MEAS - MV A MAX VEL: 51.7 CM/SEC
BH CV ECHO MEAS - MV E MAX VEL: 94 CM/SEC
BH CV ECHO MEAS - MV E/A: 1.82
BH CV ECHO MEAS - MV MAX PG: 5.5 MMHG
BH CV ECHO MEAS - MV MEAN PG: 2.3 MMHG
BH CV ECHO MEAS - MV V2 VTI: 25.5 CM
BH CV ECHO MEAS - MVA(VTI): 1.68 CM2
BH CV ECHO MEAS - RVDD: 2.8 CM
BH CV ECHO MEAS - SV(LVOT): 42.9 ML
BH CV ECHO MEAS - SV(MOD-SP2): 34 ML
BH CV ECHO MEAS - SV(MOD-SP4): 39.4 ML
BH CV ECHO MEAS - SVI(LVOT): 16.3 ML/M2
BH CV ECHO MEAS - SVI(MOD-SP2): 12.9 ML/M2
BH CV ECHO MEAS - SVI(MOD-SP4): 15 ML/M2
BH CV ECHO MEASUREMENTS AVERAGE E/E' RATIO: 9.13
BH CV IMMEDIATE POST TECH DATA BLOOD PRESSURE: NORMAL MMHG
BH CV IMMEDIATE POST TECH DATA HEART RATE: 98 BPM
BH CV IMMEDIATE POST TECH DATA OXYGEN SATS: 100 %
BH CV REST NUCLEAR ISOTOPE DOSE: 10.2 MCI
BH CV SIX MINUTE RECOVERY TECH DATA BLOOD PRESSURE: NORMAL
BH CV SIX MINUTE RECOVERY TECH DATA HEART RATE: 99 BPM
BH CV SIX MINUTE RECOVERY TECH DATA OXYGEN SATURATION: 99 %
BH CV STRESS BP STAGE 1: NORMAL
BH CV STRESS COMMENTS STAGE 1: NORMAL
BH CV STRESS DOSE REGADENOSON STAGE 1: 0.4
BH CV STRESS DURATION MIN STAGE 1: 0
BH CV STRESS DURATION SEC STAGE 1: 10
BH CV STRESS HR STAGE 1: 110
BH CV STRESS NUCLEAR ISOTOPE DOSE: 37.8 MCI
BH CV STRESS O2 STAGE 1: 98
BH CV STRESS PROTOCOL 1: NORMAL
BH CV STRESS RECOVERY BP: NORMAL MMHG
BH CV STRESS RECOVERY HR: 99 BPM
BH CV STRESS RECOVERY O2: 99 %
BH CV STRESS STAGE 1: 1
BH CV THREE MINUTE POST TECH DATA BLOOD PRESSURE: NORMAL MMHG
BH CV THREE MINUTE POST TECH DATA HEART RATE: 94 BPM
BH CV THREE MINUTE POST TECH DATA OXYGEN SATURATION: 99 %
BUN SERPL-MCNC: 18.3 MG/DL (ref 8–23)
BUN/CREAT SERPL: 15 (ref 7–25)
CALCIUM SPEC-SCNC: 8.9 MG/DL (ref 8.6–10.5)
CHLORIDE SERPL-SCNC: 104 MMOL/L (ref 98–107)
CO2 SERPL-SCNC: 21.4 MMOL/L (ref 22–29)
CREAT SERPL-MCNC: 1.22 MG/DL (ref 0.76–1.27)
DEPRECATED RDW RBC AUTO: 44.3 FL (ref 37–54)
EGFRCR SERPLBLD CKD-EPI 2021: 67 ML/MIN/1.73
ERYTHROCYTE [DISTWIDTH] IN BLOOD BY AUTOMATED COUNT: 13.5 % (ref 12.3–15.4)
GLUCOSE SERPL-MCNC: 106 MG/DL (ref 65–99)
HCT VFR BLD AUTO: 42.5 % (ref 37.5–51)
HGB BLD-MCNC: 14.5 G/DL (ref 13–17.7)
LEFT ATRIUM VOLUME INDEX: 10.1 ML/M2
LV EF BIPLANE MOD: 57.9 %
MAGNESIUM SERPL-MCNC: 2.1 MG/DL (ref 1.6–2.4)
MAXIMAL PREDICTED HEART RATE: 158 BPM
MCH RBC QN AUTO: 30.5 PG (ref 26.6–33)
MCHC RBC AUTO-ENTMCNC: 34.1 G/DL (ref 31.5–35.7)
MCV RBC AUTO: 89.3 FL (ref 79–97)
PERCENT MAX PREDICTED HR: 69.62 %
PLATELET # BLD AUTO: 224 10*3/MM3 (ref 140–450)
PMV BLD AUTO: 9.7 FL (ref 6–12)
POTASSIUM SERPL-SCNC: 4 MMOL/L (ref 3.5–5.2)
RBC # BLD AUTO: 4.76 10*6/MM3 (ref 4.14–5.8)
SODIUM SERPL-SCNC: 135 MMOL/L (ref 136–145)
SPECT HRT GATED+EF W RNC IV: 43 %
STRESS BASELINE BP: NORMAL MMHG
STRESS BASELINE HR: 87 BPM
STRESS O2 SAT REST: 98 %
STRESS PERCENT HR: 82 %
STRESS POST O2 SAT PEAK: 100 %
STRESS POST PEAK BP: NORMAL MMHG
STRESS POST PEAK HR: 110 BPM
STRESS TARGET HR: 134 BPM
WBC NRBC COR # BLD AUTO: 9.06 10*3/MM3 (ref 3.4–10.8)

## 2025-08-06 PROCEDURE — 25010000002 FUROSEMIDE PER 20 MG: Performed by: INTERNAL MEDICINE

## 2025-08-06 PROCEDURE — 99232 SBSQ HOSP IP/OBS MODERATE 35: CPT | Performed by: INTERNAL MEDICINE

## 2025-08-06 PROCEDURE — 34310000005 TECHNETIUM TETROFOSMIN KIT: Performed by: INTERNAL MEDICINE

## 2025-08-06 PROCEDURE — A9502 TC99M TETROFOSMIN: HCPCS | Performed by: INTERNAL MEDICINE

## 2025-08-06 PROCEDURE — 93017 CV STRESS TEST TRACING ONLY: CPT

## 2025-08-06 PROCEDURE — 80048 BASIC METABOLIC PNL TOTAL CA: CPT | Performed by: INTERNAL MEDICINE

## 2025-08-06 PROCEDURE — 25010000002 AMIODARONE IN DEXTROSE 5% 360-4.14 MG/200ML-% SOLUTION: Performed by: INTERNAL MEDICINE

## 2025-08-06 PROCEDURE — 93018 CV STRESS TEST I&R ONLY: CPT | Performed by: INTERNAL MEDICINE

## 2025-08-06 PROCEDURE — 25010000002 REGADENOSON 0.4 MG/5ML SOLUTION: Performed by: INTERNAL MEDICINE

## 2025-08-06 PROCEDURE — 78452 HT MUSCLE IMAGE SPECT MULT: CPT | Performed by: INTERNAL MEDICINE

## 2025-08-06 PROCEDURE — 85027 COMPLETE CBC AUTOMATED: CPT | Performed by: INTERNAL MEDICINE

## 2025-08-06 PROCEDURE — 83735 ASSAY OF MAGNESIUM: CPT | Performed by: INTERNAL MEDICINE

## 2025-08-06 PROCEDURE — 93016 CV STRESS TEST SUPVJ ONLY: CPT

## 2025-08-06 PROCEDURE — 78452 HT MUSCLE IMAGE SPECT MULT: CPT

## 2025-08-06 PROCEDURE — 97161 PT EVAL LOW COMPLEX 20 MIN: CPT

## 2025-08-06 RX ORDER — FUROSEMIDE 10 MG/ML
40 INJECTION INTRAMUSCULAR; INTRAVENOUS ONCE
Status: COMPLETED | OUTPATIENT
Start: 2025-08-06 | End: 2025-08-06

## 2025-08-06 RX ORDER — FUROSEMIDE 10 MG/ML
80 INJECTION INTRAMUSCULAR; INTRAVENOUS
Status: DISCONTINUED | OUTPATIENT
Start: 2025-08-06 | End: 2025-08-07

## 2025-08-06 RX ORDER — REGADENOSON 0.08 MG/ML
0.4 INJECTION, SOLUTION INTRAVENOUS
Status: COMPLETED | OUTPATIENT
Start: 2025-08-06 | End: 2025-08-06

## 2025-08-06 RX ORDER — METOPROLOL TARTRATE 50 MG
50 TABLET ORAL EVERY 8 HOURS SCHEDULED
Status: DISCONTINUED | OUTPATIENT
Start: 2025-08-06 | End: 2025-08-09

## 2025-08-06 RX ADMIN — FUROSEMIDE 40 MG: 10 INJECTION, SOLUTION INTRAMUSCULAR; INTRAVENOUS at 16:11

## 2025-08-06 RX ADMIN — REGADENOSON 0.4 MG: 0.08 INJECTION, SOLUTION INTRAVENOUS at 08:40

## 2025-08-06 RX ADMIN — APIXABAN 5 MG: 5 TABLET, FILM COATED ORAL at 10:44

## 2025-08-06 RX ADMIN — METOPROLOL TARTRATE 25 MG: 25 TABLET, FILM COATED ORAL at 10:44

## 2025-08-06 RX ADMIN — METOPROLOL TARTRATE 25 MG: 25 TABLET, FILM COATED ORAL at 16:11

## 2025-08-06 RX ADMIN — METOPROLOL TARTRATE 50 MG: 50 TABLET, FILM COATED ORAL at 21:05

## 2025-08-06 RX ADMIN — ATORVASTATIN CALCIUM 40 MG: 40 TABLET, FILM COATED ORAL at 21:05

## 2025-08-06 RX ADMIN — TETROFOSMIN 1 DOSE: 1.38 INJECTION, POWDER, LYOPHILIZED, FOR SOLUTION INTRAVENOUS at 07:47

## 2025-08-06 RX ADMIN — APIXABAN 5 MG: 5 TABLET, FILM COATED ORAL at 21:06

## 2025-08-06 RX ADMIN — FUROSEMIDE 80 MG: 10 INJECTION, SOLUTION INTRAMUSCULAR; INTRAVENOUS at 18:29

## 2025-08-06 RX ADMIN — BUPROPION HYDROCHLORIDE 150 MG: 100 TABLET, FILM COATED ORAL at 21:05

## 2025-08-06 RX ADMIN — Medication 10 ML: at 10:44

## 2025-08-06 RX ADMIN — AMIODARONE HYDROCHLORIDE 0.5 MG/MIN: 1.8 INJECTION, SOLUTION INTRAVENOUS at 00:15

## 2025-08-06 RX ADMIN — TAMSULOSIN HYDROCHLORIDE 0.8 MG: 0.4 CAPSULE ORAL at 10:44

## 2025-08-06 RX ADMIN — BUPROPION HYDROCHLORIDE 150 MG: 100 TABLET, FILM COATED ORAL at 10:44

## 2025-08-06 RX ADMIN — TETROFOSMIN 1 DOSE: 1.38 INJECTION, POWDER, LYOPHILIZED, FOR SOLUTION INTRAVENOUS at 08:40

## 2025-08-06 RX ADMIN — Medication 10 ML: at 21:05

## 2025-08-07 PROBLEM — R94.39 ABNORMAL NUCLEAR STRESS TEST: Status: ACTIVE | Noted: 2025-08-04

## 2025-08-07 LAB
ANION GAP SERPL CALCULATED.3IONS-SCNC: 12.1 MMOL/L (ref 5–15)
BUN SERPL-MCNC: 22.1 MG/DL (ref 8–23)
BUN/CREAT SERPL: 14.2 (ref 7–25)
CALCIUM SPEC-SCNC: 9.4 MG/DL (ref 8.6–10.5)
CHLORIDE SERPL-SCNC: 102 MMOL/L (ref 98–107)
CO2 SERPL-SCNC: 22.9 MMOL/L (ref 22–29)
CREAT SERPL-MCNC: 1.56 MG/DL (ref 0.76–1.27)
DEPRECATED RDW RBC AUTO: 43.2 FL (ref 37–54)
EGFRCR SERPLBLD CKD-EPI 2021: 49.9 ML/MIN/1.73
ERYTHROCYTE [DISTWIDTH] IN BLOOD BY AUTOMATED COUNT: 13.3 % (ref 12.3–15.4)
GLUCOSE SERPL-MCNC: 106 MG/DL (ref 65–99)
HCT VFR BLD AUTO: 45.2 % (ref 37.5–51)
HGB BLD-MCNC: 15.6 G/DL (ref 13–17.7)
MAGNESIUM SERPL-MCNC: 2.2 MG/DL (ref 1.6–2.4)
MCH RBC QN AUTO: 30.5 PG (ref 26.6–33)
MCHC RBC AUTO-ENTMCNC: 34.5 G/DL (ref 31.5–35.7)
MCV RBC AUTO: 88.5 FL (ref 79–97)
PLATELET # BLD AUTO: 242 10*3/MM3 (ref 140–450)
PMV BLD AUTO: 9.6 FL (ref 6–12)
POTASSIUM SERPL-SCNC: 3.8 MMOL/L (ref 3.5–5.2)
RBC # BLD AUTO: 5.11 10*6/MM3 (ref 4.14–5.8)
SODIUM SERPL-SCNC: 137 MMOL/L (ref 136–145)
WBC NRBC COR # BLD AUTO: 10.21 10*3/MM3 (ref 3.4–10.8)

## 2025-08-07 PROCEDURE — 83735 ASSAY OF MAGNESIUM: CPT | Performed by: INTERNAL MEDICINE

## 2025-08-07 PROCEDURE — 85027 COMPLETE CBC AUTOMATED: CPT | Performed by: INTERNAL MEDICINE

## 2025-08-07 PROCEDURE — 99232 SBSQ HOSP IP/OBS MODERATE 35: CPT | Performed by: INTERNAL MEDICINE

## 2025-08-07 PROCEDURE — 80048 BASIC METABOLIC PNL TOTAL CA: CPT | Performed by: INTERNAL MEDICINE

## 2025-08-07 PROCEDURE — 25010000002 FUROSEMIDE PER 20 MG: Performed by: INTERNAL MEDICINE

## 2025-08-07 RX ORDER — ASPIRIN 81 MG/1
81 TABLET, CHEWABLE ORAL DAILY
Status: DISCONTINUED | OUTPATIENT
Start: 2025-08-07 | End: 2025-08-13 | Stop reason: HOSPADM

## 2025-08-07 RX ORDER — FUROSEMIDE 10 MG/ML
40 INJECTION INTRAMUSCULAR; INTRAVENOUS
Status: DISCONTINUED | OUTPATIENT
Start: 2025-08-07 | End: 2025-08-08

## 2025-08-07 RX ADMIN — ASPIRIN 81 MG CHEWABLE TABLET 81 MG: 81 TABLET CHEWABLE at 09:19

## 2025-08-07 RX ADMIN — ATORVASTATIN CALCIUM 40 MG: 40 TABLET, FILM COATED ORAL at 21:24

## 2025-08-07 RX ADMIN — METOPROLOL TARTRATE 50 MG: 50 TABLET, FILM COATED ORAL at 14:57

## 2025-08-07 RX ADMIN — Medication 10 ML: at 08:22

## 2025-08-07 RX ADMIN — METOPROLOL TARTRATE 50 MG: 50 TABLET, FILM COATED ORAL at 05:38

## 2025-08-07 RX ADMIN — BUPROPION HYDROCHLORIDE 150 MG: 100 TABLET, FILM COATED ORAL at 21:24

## 2025-08-07 RX ADMIN — FUROSEMIDE 80 MG: 10 INJECTION, SOLUTION INTRAMUSCULAR; INTRAVENOUS at 08:21

## 2025-08-07 RX ADMIN — METOPROLOL TARTRATE 50 MG: 50 TABLET, FILM COATED ORAL at 21:24

## 2025-08-07 RX ADMIN — FUROSEMIDE 40 MG: 10 INJECTION, SOLUTION INTRAMUSCULAR; INTRAVENOUS at 17:25

## 2025-08-07 RX ADMIN — TAMSULOSIN HYDROCHLORIDE 0.8 MG: 0.4 CAPSULE ORAL at 08:21

## 2025-08-07 RX ADMIN — BUPROPION HYDROCHLORIDE 150 MG: 100 TABLET, FILM COATED ORAL at 08:21

## 2025-08-07 RX ADMIN — Medication 10 ML: at 21:25

## 2025-08-08 LAB
ANION GAP SERPL CALCULATED.3IONS-SCNC: 13.4 MMOL/L (ref 5–15)
BUN SERPL-MCNC: 26.7 MG/DL (ref 8–23)
BUN/CREAT SERPL: 17.7 (ref 7–25)
CALCIUM SPEC-SCNC: 9.5 MG/DL (ref 8.6–10.5)
CHLORIDE SERPL-SCNC: 97 MMOL/L (ref 98–107)
CO2 SERPL-SCNC: 23.6 MMOL/L (ref 22–29)
CREAT SERPL-MCNC: 1.51 MG/DL (ref 0.76–1.27)
DEPRECATED RDW RBC AUTO: 43.4 FL (ref 37–54)
EGFRCR SERPLBLD CKD-EPI 2021: 51.9 ML/MIN/1.73
ERYTHROCYTE [DISTWIDTH] IN BLOOD BY AUTOMATED COUNT: 13.4 % (ref 12.3–15.4)
GLUCOSE SERPL-MCNC: 112 MG/DL (ref 65–99)
HCT VFR BLD AUTO: 47.8 % (ref 37.5–51)
HGB BLD-MCNC: 16.5 G/DL (ref 13–17.7)
MAGNESIUM SERPL-MCNC: 2.2 MG/DL (ref 1.6–2.4)
MCH RBC QN AUTO: 30.7 PG (ref 26.6–33)
MCHC RBC AUTO-ENTMCNC: 34.5 G/DL (ref 31.5–35.7)
MCV RBC AUTO: 89 FL (ref 79–97)
PLATELET # BLD AUTO: 238 10*3/MM3 (ref 140–450)
PMV BLD AUTO: 9.7 FL (ref 6–12)
POTASSIUM SERPL-SCNC: 3.5 MMOL/L (ref 3.5–5.2)
QT INTERVAL: 294 MS
QT INTERVAL: 353 MS
QTC INTERVAL: 444 MS
QTC INTERVAL: 460 MS
RBC # BLD AUTO: 5.37 10*6/MM3 (ref 4.14–5.8)
SODIUM SERPL-SCNC: 134 MMOL/L (ref 136–145)
WBC NRBC COR # BLD AUTO: 10.21 10*3/MM3 (ref 3.4–10.8)

## 2025-08-08 PROCEDURE — 99232 SBSQ HOSP IP/OBS MODERATE 35: CPT | Performed by: INTERNAL MEDICINE

## 2025-08-08 PROCEDURE — 25010000002 FUROSEMIDE PER 20 MG: Performed by: INTERNAL MEDICINE

## 2025-08-08 PROCEDURE — B2111ZZ FLUOROSCOPY OF MULTIPLE CORONARY ARTERIES USING LOW OSMOLAR CONTRAST: ICD-10-PCS | Performed by: INTERNAL MEDICINE

## 2025-08-08 PROCEDURE — 93458 L HRT ARTERY/VENTRICLE ANGIO: CPT | Performed by: INTERNAL MEDICINE

## 2025-08-08 PROCEDURE — 94799 UNLISTED PULMONARY SVC/PX: CPT

## 2025-08-08 PROCEDURE — 4A023N7 MEASUREMENT OF CARDIAC SAMPLING AND PRESSURE, LEFT HEART, PERCUTANEOUS APPROACH: ICD-10-PCS | Performed by: INTERNAL MEDICINE

## 2025-08-08 PROCEDURE — C1751 CATH, INF, PER/CENT/MIDLINE: HCPCS | Performed by: INTERNAL MEDICINE

## 2025-08-08 PROCEDURE — 25810000003 SODIUM CHLORIDE 0.9 % SOLUTION: Performed by: INTERNAL MEDICINE

## 2025-08-08 PROCEDURE — C1894 INTRO/SHEATH, NON-LASER: HCPCS | Performed by: INTERNAL MEDICINE

## 2025-08-08 PROCEDURE — 94761 N-INVAS EAR/PLS OXIMETRY MLT: CPT

## 2025-08-08 PROCEDURE — C1769 GUIDE WIRE: HCPCS | Performed by: INTERNAL MEDICINE

## 2025-08-08 PROCEDURE — 25010000002 MIDAZOLAM PER 1MG: Performed by: INTERNAL MEDICINE

## 2025-08-08 PROCEDURE — 25010000002 LIDOCAINE 2% SOLUTION: Performed by: INTERNAL MEDICINE

## 2025-08-08 PROCEDURE — 85027 COMPLETE CBC AUTOMATED: CPT | Performed by: INTERNAL MEDICINE

## 2025-08-08 PROCEDURE — B2151ZZ FLUOROSCOPY OF LEFT HEART USING LOW OSMOLAR CONTRAST: ICD-10-PCS | Performed by: INTERNAL MEDICINE

## 2025-08-08 PROCEDURE — 80048 BASIC METABOLIC PNL TOTAL CA: CPT | Performed by: INTERNAL MEDICINE

## 2025-08-08 PROCEDURE — 25010000002 HEPARIN (PORCINE) PER 1000 UNITS: Performed by: INTERNAL MEDICINE

## 2025-08-08 PROCEDURE — 25010000002 FENTANYL CITRATE (PF) 50 MCG/ML SOLUTION: Performed by: INTERNAL MEDICINE

## 2025-08-08 PROCEDURE — 83735 ASSAY OF MAGNESIUM: CPT | Performed by: INTERNAL MEDICINE

## 2025-08-08 PROCEDURE — 25510000001 IOPAMIDOL PER 1 ML: Performed by: INTERNAL MEDICINE

## 2025-08-08 RX ORDER — SODIUM CHLORIDE 9 MG/ML
100 INJECTION, SOLUTION INTRAVENOUS CONTINUOUS
Status: DISCONTINUED | OUTPATIENT
Start: 2025-08-08 | End: 2025-08-08

## 2025-08-08 RX ORDER — VERAPAMIL HYDROCHLORIDE 2.5 MG/ML
INJECTION INTRAVENOUS
Status: DISCONTINUED | OUTPATIENT
Start: 2025-08-08 | End: 2025-08-08 | Stop reason: HOSPADM

## 2025-08-08 RX ORDER — NALOXONE HCL 0.4 MG/ML
0.4 VIAL (ML) INJECTION
Status: DISCONTINUED | OUTPATIENT
Start: 2025-08-08 | End: 2025-08-13 | Stop reason: HOSPADM

## 2025-08-08 RX ORDER — ONDANSETRON 4 MG/1
4 TABLET, ORALLY DISINTEGRATING ORAL EVERY 6 HOURS PRN
Status: DISCONTINUED | OUTPATIENT
Start: 2025-08-08 | End: 2025-08-13 | Stop reason: HOSPADM

## 2025-08-08 RX ORDER — FUROSEMIDE 10 MG/ML
INJECTION INTRAMUSCULAR; INTRAVENOUS
Status: DISCONTINUED | OUTPATIENT
Start: 2025-08-08 | End: 2025-08-08 | Stop reason: HOSPADM

## 2025-08-08 RX ORDER — LIDOCAINE HYDROCHLORIDE 20 MG/ML
INJECTION, SOLUTION INFILTRATION; PERINEURAL
Status: DISCONTINUED | OUTPATIENT
Start: 2025-08-08 | End: 2025-08-08 | Stop reason: HOSPADM

## 2025-08-08 RX ORDER — MORPHINE SULFATE 2 MG/ML
1 INJECTION, SOLUTION INTRAMUSCULAR; INTRAVENOUS EVERY 4 HOURS PRN
Status: DISCONTINUED | OUTPATIENT
Start: 2025-08-08 | End: 2025-08-13 | Stop reason: HOSPADM

## 2025-08-08 RX ORDER — ASPIRIN 81 MG/1
TABLET, CHEWABLE ORAL
Status: DISCONTINUED | OUTPATIENT
Start: 2025-08-08 | End: 2025-08-08 | Stop reason: HOSPADM

## 2025-08-08 RX ORDER — FUROSEMIDE 10 MG/ML
80 INJECTION INTRAMUSCULAR; INTRAVENOUS
Status: DISCONTINUED | OUTPATIENT
Start: 2025-08-08 | End: 2025-08-09

## 2025-08-08 RX ORDER — FENTANYL CITRATE 50 UG/ML
INJECTION, SOLUTION INTRAMUSCULAR; INTRAVENOUS
Status: DISCONTINUED | OUTPATIENT
Start: 2025-08-08 | End: 2025-08-08 | Stop reason: HOSPADM

## 2025-08-08 RX ORDER — ACETAMINOPHEN 325 MG/1
650 TABLET ORAL EVERY 4 HOURS PRN
Status: DISCONTINUED | OUTPATIENT
Start: 2025-08-08 | End: 2025-08-12

## 2025-08-08 RX ORDER — ONDANSETRON 2 MG/ML
4 INJECTION INTRAMUSCULAR; INTRAVENOUS EVERY 6 HOURS PRN
Status: DISCONTINUED | OUTPATIENT
Start: 2025-08-08 | End: 2025-08-12

## 2025-08-08 RX ORDER — MIDAZOLAM HYDROCHLORIDE 2 MG/2ML
INJECTION, SOLUTION INTRAMUSCULAR; INTRAVENOUS
Status: DISCONTINUED | OUTPATIENT
Start: 2025-08-08 | End: 2025-08-08 | Stop reason: HOSPADM

## 2025-08-08 RX ORDER — HEPARIN SODIUM 1000 [USP'U]/ML
INJECTION, SOLUTION INTRAVENOUS; SUBCUTANEOUS
Status: DISCONTINUED | OUTPATIENT
Start: 2025-08-08 | End: 2025-08-08 | Stop reason: HOSPADM

## 2025-08-08 RX ADMIN — FUROSEMIDE 80 MG: 10 INJECTION, SOLUTION INTRAMUSCULAR; INTRAVENOUS at 17:03

## 2025-08-08 RX ADMIN — ATORVASTATIN CALCIUM 40 MG: 40 TABLET, FILM COATED ORAL at 21:20

## 2025-08-08 RX ADMIN — METOPROLOL TARTRATE 50 MG: 50 TABLET, FILM COATED ORAL at 21:20

## 2025-08-08 RX ADMIN — ACETAMINOPHEN 650 MG: 325 TABLET ORAL at 17:03

## 2025-08-08 RX ADMIN — BUPROPION HYDROCHLORIDE 150 MG: 100 TABLET, FILM COATED ORAL at 21:20

## 2025-08-08 RX ADMIN — SODIUM CHLORIDE 100 ML/HR: 9 INJECTION, SOLUTION INTRAVENOUS at 15:13

## 2025-08-08 RX ADMIN — EMPAGLIFLOZIN 10 MG: 10 TABLET, FILM COATED ORAL at 15:12

## 2025-08-08 RX ADMIN — TAMSULOSIN HYDROCHLORIDE 0.8 MG: 0.4 CAPSULE ORAL at 15:11

## 2025-08-08 RX ADMIN — METOPROLOL TARTRATE 50 MG: 50 TABLET, FILM COATED ORAL at 15:12

## 2025-08-09 PROBLEM — I50.31 ACUTE DIASTOLIC CHF (CONGESTIVE HEART FAILURE): Status: ACTIVE | Noted: 2025-08-09

## 2025-08-09 LAB
ANION GAP SERPL CALCULATED.3IONS-SCNC: 14.8 MMOL/L (ref 5–15)
BUN SERPL-MCNC: 29.1 MG/DL (ref 8–23)
BUN/CREAT SERPL: 17.9 (ref 7–25)
CALCIUM SPEC-SCNC: 9.1 MG/DL (ref 8.6–10.5)
CHLORIDE SERPL-SCNC: 96 MMOL/L (ref 98–107)
CO2 SERPL-SCNC: 19.2 MMOL/L (ref 22–29)
CREAT SERPL-MCNC: 1.63 MG/DL (ref 0.76–1.27)
DEPRECATED RDW RBC AUTO: 43.3 FL (ref 37–54)
EGFRCR SERPLBLD CKD-EPI 2021: 47.3 ML/MIN/1.73
ERYTHROCYTE [DISTWIDTH] IN BLOOD BY AUTOMATED COUNT: 13.3 % (ref 12.3–15.4)
GLUCOSE SERPL-MCNC: 109 MG/DL (ref 65–99)
HCT VFR BLD AUTO: 45.7 % (ref 37.5–51)
HGB BLD-MCNC: 15.8 G/DL (ref 13–17.7)
MAGNESIUM SERPL-MCNC: 2.2 MG/DL (ref 1.6–2.4)
MCH RBC QN AUTO: 30.6 PG (ref 26.6–33)
MCHC RBC AUTO-ENTMCNC: 34.6 G/DL (ref 31.5–35.7)
MCV RBC AUTO: 88.6 FL (ref 79–97)
PLATELET # BLD AUTO: 235 10*3/MM3 (ref 140–450)
PMV BLD AUTO: 9.5 FL (ref 6–12)
POTASSIUM SERPL-SCNC: 3.6 MMOL/L (ref 3.5–5.2)
RBC # BLD AUTO: 5.16 10*6/MM3 (ref 4.14–5.8)
SODIUM SERPL-SCNC: 130 MMOL/L (ref 136–145)
WBC NRBC COR # BLD AUTO: 10.42 10*3/MM3 (ref 3.4–10.8)

## 2025-08-09 PROCEDURE — 83735 ASSAY OF MAGNESIUM: CPT | Performed by: INTERNAL MEDICINE

## 2025-08-09 PROCEDURE — 85027 COMPLETE CBC AUTOMATED: CPT | Performed by: INTERNAL MEDICINE

## 2025-08-09 PROCEDURE — 25010000002 FUROSEMIDE PER 20 MG: Performed by: INTERNAL MEDICINE

## 2025-08-09 PROCEDURE — 80048 BASIC METABOLIC PNL TOTAL CA: CPT | Performed by: INTERNAL MEDICINE

## 2025-08-09 PROCEDURE — 94799 UNLISTED PULMONARY SVC/PX: CPT

## 2025-08-09 PROCEDURE — 94761 N-INVAS EAR/PLS OXIMETRY MLT: CPT

## 2025-08-09 RX ORDER — FUROSEMIDE 10 MG/ML
40 INJECTION INTRAMUSCULAR; INTRAVENOUS
Status: DISCONTINUED | OUTPATIENT
Start: 2025-08-09 | End: 2025-08-12

## 2025-08-09 RX ADMIN — ATORVASTATIN CALCIUM 40 MG: 40 TABLET, FILM COATED ORAL at 20:53

## 2025-08-09 RX ADMIN — FUROSEMIDE 40 MG: 10 INJECTION, SOLUTION INTRAMUSCULAR; INTRAVENOUS at 18:06

## 2025-08-09 RX ADMIN — ASPIRIN 81 MG CHEWABLE TABLET 81 MG: 81 TABLET CHEWABLE at 09:01

## 2025-08-09 RX ADMIN — METOPROLOL TARTRATE 75 MG: 25 TABLET, FILM COATED ORAL at 13:24

## 2025-08-09 RX ADMIN — TAMSULOSIN HYDROCHLORIDE 0.8 MG: 0.4 CAPSULE ORAL at 09:00

## 2025-08-09 RX ADMIN — FUROSEMIDE 40 MG: 10 INJECTION, SOLUTION INTRAMUSCULAR; INTRAVENOUS at 09:00

## 2025-08-09 RX ADMIN — BUPROPION HYDROCHLORIDE 150 MG: 100 TABLET, FILM COATED ORAL at 09:00

## 2025-08-09 RX ADMIN — BUPROPION HYDROCHLORIDE 150 MG: 100 TABLET, FILM COATED ORAL at 20:53

## 2025-08-09 RX ADMIN — APIXABAN 5 MG: 5 TABLET, FILM COATED ORAL at 09:00

## 2025-08-09 RX ADMIN — METOPROLOL TARTRATE 75 MG: 25 TABLET, FILM COATED ORAL at 20:55

## 2025-08-09 RX ADMIN — EMPAGLIFLOZIN 10 MG: 10 TABLET, FILM COATED ORAL at 09:00

## 2025-08-09 RX ADMIN — APIXABAN 5 MG: 5 TABLET, FILM COATED ORAL at 20:53

## 2025-08-10 LAB
ANION GAP SERPL CALCULATED.3IONS-SCNC: 12.9 MMOL/L (ref 5–15)
BUN SERPL-MCNC: 31 MG/DL (ref 8–23)
BUN/CREAT SERPL: 20.7 (ref 7–25)
CALCIUM SPEC-SCNC: 9.4 MG/DL (ref 8.6–10.5)
CHLORIDE SERPL-SCNC: 99 MMOL/L (ref 98–107)
CO2 SERPL-SCNC: 23.1 MMOL/L (ref 22–29)
CREAT SERPL-MCNC: 1.5 MG/DL (ref 0.76–1.27)
DEPRECATED RDW RBC AUTO: 42.1 FL (ref 37–54)
EGFRCR SERPLBLD CKD-EPI 2021: 52.3 ML/MIN/1.73
ERYTHROCYTE [DISTWIDTH] IN BLOOD BY AUTOMATED COUNT: 13.4 % (ref 12.3–15.4)
GLUCOSE SERPL-MCNC: 105 MG/DL (ref 65–99)
HCT VFR BLD AUTO: 47.1 % (ref 37.5–51)
HGB BLD-MCNC: 16.5 G/DL (ref 13–17.7)
MAGNESIUM SERPL-MCNC: 2.5 MG/DL (ref 1.6–2.4)
MCH RBC QN AUTO: 30.3 PG (ref 26.6–33)
MCHC RBC AUTO-ENTMCNC: 35 G/DL (ref 31.5–35.7)
MCV RBC AUTO: 86.4 FL (ref 79–97)
PLATELET # BLD AUTO: 248 10*3/MM3 (ref 140–450)
PMV BLD AUTO: 9.8 FL (ref 6–12)
POTASSIUM SERPL-SCNC: 3.6 MMOL/L (ref 3.5–5.2)
RBC # BLD AUTO: 5.45 10*6/MM3 (ref 4.14–5.8)
SODIUM SERPL-SCNC: 135 MMOL/L (ref 136–145)
WBC NRBC COR # BLD AUTO: 11.32 10*3/MM3 (ref 3.4–10.8)

## 2025-08-10 PROCEDURE — 25010000002 FUROSEMIDE PER 20 MG: Performed by: INTERNAL MEDICINE

## 2025-08-10 PROCEDURE — 99232 SBSQ HOSP IP/OBS MODERATE 35: CPT | Performed by: INTERNAL MEDICINE

## 2025-08-10 PROCEDURE — 83735 ASSAY OF MAGNESIUM: CPT | Performed by: INTERNAL MEDICINE

## 2025-08-10 PROCEDURE — 85027 COMPLETE CBC AUTOMATED: CPT | Performed by: INTERNAL MEDICINE

## 2025-08-10 PROCEDURE — 80048 BASIC METABOLIC PNL TOTAL CA: CPT | Performed by: INTERNAL MEDICINE

## 2025-08-10 RX ORDER — POTASSIUM CHLORIDE 1500 MG/1
40 TABLET, FILM COATED, EXTENDED RELEASE ORAL ONCE
Status: COMPLETED | OUTPATIENT
Start: 2025-08-10 | End: 2025-08-10

## 2025-08-10 RX ORDER — METOPROLOL TARTRATE 50 MG
100 TABLET ORAL EVERY 8 HOURS SCHEDULED
Status: DISCONTINUED | OUTPATIENT
Start: 2025-08-10 | End: 2025-08-13 | Stop reason: HOSPADM

## 2025-08-10 RX ADMIN — POTASSIUM CHLORIDE 40 MEQ: 1500 TABLET, EXTENDED RELEASE ORAL at 08:35

## 2025-08-10 RX ADMIN — METOPROLOL TARTRATE 100 MG: 50 TABLET, FILM COATED ORAL at 21:00

## 2025-08-10 RX ADMIN — BUPROPION HYDROCHLORIDE 150 MG: 100 TABLET, FILM COATED ORAL at 20:58

## 2025-08-10 RX ADMIN — BUPROPION HYDROCHLORIDE 150 MG: 100 TABLET, FILM COATED ORAL at 08:35

## 2025-08-10 RX ADMIN — ASPIRIN 81 MG CHEWABLE TABLET 81 MG: 81 TABLET CHEWABLE at 08:36

## 2025-08-10 RX ADMIN — EMPAGLIFLOZIN 10 MG: 10 TABLET, FILM COATED ORAL at 08:36

## 2025-08-10 RX ADMIN — APIXABAN 5 MG: 5 TABLET, FILM COATED ORAL at 20:58

## 2025-08-10 RX ADMIN — ATORVASTATIN CALCIUM 40 MG: 40 TABLET, FILM COATED ORAL at 20:59

## 2025-08-10 RX ADMIN — FUROSEMIDE 40 MG: 10 INJECTION, SOLUTION INTRAMUSCULAR; INTRAVENOUS at 17:20

## 2025-08-10 RX ADMIN — APIXABAN 5 MG: 5 TABLET, FILM COATED ORAL at 08:36

## 2025-08-10 RX ADMIN — FUROSEMIDE 40 MG: 10 INJECTION, SOLUTION INTRAMUSCULAR; INTRAVENOUS at 08:35

## 2025-08-10 RX ADMIN — TAMSULOSIN HYDROCHLORIDE 0.8 MG: 0.4 CAPSULE ORAL at 08:35

## 2025-08-10 RX ADMIN — METOPROLOL TARTRATE 100 MG: 50 TABLET, FILM COATED ORAL at 13:40

## 2025-08-11 ENCOUNTER — APPOINTMENT (OUTPATIENT)
Dept: CARDIOLOGY | Facility: HOSPITAL | Age: 65
DRG: 286 | End: 2025-08-11
Payer: OTHER GOVERNMENT

## 2025-08-11 LAB
ANION GAP SERPL CALCULATED.3IONS-SCNC: 11.7 MMOL/L (ref 5–15)
BUN SERPL-MCNC: 32.3 MG/DL (ref 8–23)
BUN/CREAT SERPL: 21.1 (ref 7–25)
CALCIUM SPEC-SCNC: 8.8 MG/DL (ref 8.6–10.5)
CHLORIDE SERPL-SCNC: 100 MMOL/L (ref 98–107)
CO2 SERPL-SCNC: 21.3 MMOL/L (ref 22–29)
CREAT SERPL-MCNC: 1.53 MG/DL (ref 0.76–1.27)
DEPRECATED RDW RBC AUTO: 43.1 FL (ref 37–54)
EGFRCR SERPLBLD CKD-EPI 2021: 51.1 ML/MIN/1.73
ERYTHROCYTE [DISTWIDTH] IN BLOOD BY AUTOMATED COUNT: 13.4 % (ref 12.3–15.4)
GLUCOSE SERPL-MCNC: 108 MG/DL (ref 65–99)
HCT VFR BLD AUTO: 45.5 % (ref 37.5–51)
HGB BLD-MCNC: 15.7 G/DL (ref 13–17.7)
MAGNESIUM SERPL-MCNC: 2.5 MG/DL (ref 1.6–2.4)
MCH RBC QN AUTO: 30.4 PG (ref 26.6–33)
MCHC RBC AUTO-ENTMCNC: 34.5 G/DL (ref 31.5–35.7)
MCV RBC AUTO: 88.2 FL (ref 79–97)
PLATELET # BLD AUTO: 221 10*3/MM3 (ref 140–450)
PMV BLD AUTO: 9.6 FL (ref 6–12)
POTASSIUM SERPL-SCNC: 3.4 MMOL/L (ref 3.5–5.2)
RBC # BLD AUTO: 5.16 10*6/MM3 (ref 4.14–5.8)
SODIUM SERPL-SCNC: 133 MMOL/L (ref 136–145)
WBC NRBC COR # BLD AUTO: 9.88 10*3/MM3 (ref 3.4–10.8)

## 2025-08-11 PROCEDURE — 93320 DOPPLER ECHO COMPLETE: CPT | Performed by: INTERNAL MEDICINE

## 2025-08-11 PROCEDURE — 93005 ELECTROCARDIOGRAM TRACING: CPT | Performed by: INTERNAL MEDICINE

## 2025-08-11 PROCEDURE — 85027 COMPLETE CBC AUTOMATED: CPT | Performed by: INTERNAL MEDICINE

## 2025-08-11 PROCEDURE — 92960 CARDIOVERSION ELECTRIC EXT: CPT | Performed by: INTERNAL MEDICINE

## 2025-08-11 PROCEDURE — 93325 DOPPLER ECHO COLOR FLOW MAPG: CPT

## 2025-08-11 PROCEDURE — 93312 ECHO TRANSESOPHAGEAL: CPT

## 2025-08-11 PROCEDURE — 93010 ELECTROCARDIOGRAM REPORT: CPT | Performed by: INTERNAL MEDICINE

## 2025-08-11 PROCEDURE — 80048 BASIC METABOLIC PNL TOTAL CA: CPT | Performed by: INTERNAL MEDICINE

## 2025-08-11 PROCEDURE — 83735 ASSAY OF MAGNESIUM: CPT | Performed by: INTERNAL MEDICINE

## 2025-08-11 PROCEDURE — 93325 DOPPLER ECHO COLOR FLOW MAPG: CPT | Performed by: INTERNAL MEDICINE

## 2025-08-11 PROCEDURE — 99232 SBSQ HOSP IP/OBS MODERATE 35: CPT | Performed by: INTERNAL MEDICINE

## 2025-08-11 PROCEDURE — 25010000002 FUROSEMIDE PER 20 MG: Performed by: INTERNAL MEDICINE

## 2025-08-11 PROCEDURE — 25010000002 MORPHINE PER 10 MG: Performed by: INTERNAL MEDICINE

## 2025-08-11 PROCEDURE — 25010000002 DIPHENHYDRAMINE PER 50 MG: Performed by: INTERNAL MEDICINE

## 2025-08-11 PROCEDURE — 25010000002 POTASSIUM CHLORIDE 10 MEQ/100ML SOLUTION: Performed by: INTERNAL MEDICINE

## 2025-08-11 PROCEDURE — 92960 CARDIOVERSION ELECTRIC EXT: CPT

## 2025-08-11 PROCEDURE — 25010000002 MIDAZOLAM PER 1MG: Performed by: INTERNAL MEDICINE

## 2025-08-11 PROCEDURE — 99152 MOD SED SAME PHYS/QHP 5/>YRS: CPT

## 2025-08-11 PROCEDURE — 93312 ECHO TRANSESOPHAGEAL: CPT | Performed by: INTERNAL MEDICINE

## 2025-08-11 PROCEDURE — 5A2204Z RESTORATION OF CARDIAC RHYTHM, SINGLE: ICD-10-PCS | Performed by: INTERNAL MEDICINE

## 2025-08-11 PROCEDURE — 93320 DOPPLER ECHO COMPLETE: CPT

## 2025-08-11 RX ORDER — MIDAZOLAM HYDROCHLORIDE 2 MG/2ML
INJECTION, SOLUTION INTRAMUSCULAR; INTRAVENOUS
Status: DISPENSED
Start: 2025-08-11 | End: 2025-08-12

## 2025-08-11 RX ORDER — DIPHENHYDRAMINE HYDROCHLORIDE 50 MG/ML
INJECTION, SOLUTION INTRAMUSCULAR; INTRAVENOUS
Status: DISPENSED
Start: 2025-08-11 | End: 2025-08-12

## 2025-08-11 RX ORDER — POTASSIUM CHLORIDE 7.45 MG/ML
10 INJECTION INTRAVENOUS
Status: DISCONTINUED | OUTPATIENT
Start: 2025-08-11 | End: 2025-08-11

## 2025-08-11 RX ORDER — DIPHENHYDRAMINE HYDROCHLORIDE 50 MG/ML
INJECTION, SOLUTION INTRAMUSCULAR; INTRAVENOUS
Status: COMPLETED | OUTPATIENT
Start: 2025-08-11 | End: 2025-08-11

## 2025-08-11 RX ORDER — MIDAZOLAM HYDROCHLORIDE 2 MG/2ML
INJECTION, SOLUTION INTRAMUSCULAR; INTRAVENOUS
Status: COMPLETED | OUTPATIENT
Start: 2025-08-11 | End: 2025-08-11

## 2025-08-11 RX ORDER — MORPHINE SULFATE 2 MG/ML
INJECTION, SOLUTION INTRAMUSCULAR; INTRAVENOUS
Status: COMPLETED | OUTPATIENT
Start: 2025-08-11 | End: 2025-08-11

## 2025-08-11 RX ORDER — MORPHINE SULFATE 2 MG/ML
INJECTION, SOLUTION INTRAMUSCULAR; INTRAVENOUS
Status: DISPENSED
Start: 2025-08-11 | End: 2025-08-12

## 2025-08-11 RX ORDER — POTASSIUM CHLORIDE 1500 MG/1
40 TABLET, FILM COATED, EXTENDED RELEASE ORAL EVERY 4 HOURS
Status: COMPLETED | OUTPATIENT
Start: 2025-08-11 | End: 2025-08-11

## 2025-08-11 RX ADMIN — EMPAGLIFLOZIN 10 MG: 10 TABLET, FILM COATED ORAL at 09:12

## 2025-08-11 RX ADMIN — ATORVASTATIN CALCIUM 40 MG: 40 TABLET, FILM COATED ORAL at 20:46

## 2025-08-11 RX ADMIN — MORPHINE SULFATE 2 MG: 2 INJECTION, SOLUTION INTRAMUSCULAR; INTRAVENOUS at 15:51

## 2025-08-11 RX ADMIN — METOPROLOL TARTRATE 100 MG: 50 TABLET, FILM COATED ORAL at 22:57

## 2025-08-11 RX ADMIN — ASPIRIN 81 MG CHEWABLE TABLET 81 MG: 81 TABLET CHEWABLE at 09:12

## 2025-08-11 RX ADMIN — APIXABAN 5 MG: 5 TABLET, FILM COATED ORAL at 09:12

## 2025-08-11 RX ADMIN — APIXABAN 5 MG: 5 TABLET, FILM COATED ORAL at 20:46

## 2025-08-11 RX ADMIN — MIDAZOLAM HYDROCHLORIDE 4 MG: 1 INJECTION, SOLUTION INTRAMUSCULAR; INTRAVENOUS at 15:34

## 2025-08-11 RX ADMIN — BUPROPION HYDROCHLORIDE 150 MG: 100 TABLET, FILM COATED ORAL at 20:45

## 2025-08-11 RX ADMIN — FUROSEMIDE 40 MG: 10 INJECTION, SOLUTION INTRAMUSCULAR; INTRAVENOUS at 17:31

## 2025-08-11 RX ADMIN — BUPROPION HYDROCHLORIDE 150 MG: 100 TABLET, FILM COATED ORAL at 09:12

## 2025-08-11 RX ADMIN — MIDAZOLAM HYDROCHLORIDE 2 MG: 1 INJECTION, SOLUTION INTRAMUSCULAR; INTRAVENOUS at 15:43

## 2025-08-11 RX ADMIN — TOPICAL ANESTHETIC 1 SPRAY: 200 SPRAY DENTAL; PERIODONTAL at 15:34

## 2025-08-11 RX ADMIN — FUROSEMIDE 40 MG: 10 INJECTION, SOLUTION INTRAMUSCULAR; INTRAVENOUS at 09:12

## 2025-08-11 RX ADMIN — POTASSIUM CHLORIDE 40 MEQ: 1500 TABLET, EXTENDED RELEASE ORAL at 13:50

## 2025-08-11 RX ADMIN — DIPHENHYDRAMINE HYDROCHLORIDE 25 MG: 50 INJECTION, SOLUTION INTRAMUSCULAR; INTRAVENOUS at 15:37

## 2025-08-11 RX ADMIN — POTASSIUM CHLORIDE 10 MEQ: 7.46 INJECTION, SOLUTION INTRAVENOUS at 08:25

## 2025-08-11 RX ADMIN — TAMSULOSIN HYDROCHLORIDE 0.8 MG: 0.4 CAPSULE ORAL at 09:12

## 2025-08-11 RX ADMIN — POTASSIUM CHLORIDE 40 MEQ: 1500 TABLET, EXTENDED RELEASE ORAL at 09:12

## 2025-08-12 ENCOUNTER — TELEPHONE (OUTPATIENT)
Dept: CARDIOLOGY | Facility: CLINIC | Age: 65
End: 2025-08-12
Payer: OTHER GOVERNMENT

## 2025-08-12 LAB
ANION GAP SERPL CALCULATED.3IONS-SCNC: 12.9 MMOL/L (ref 5–15)
BH CV ECHO SHUNT ASSESSMENT PERFORMED (HIDDEN SCRIPTING): 1
BUN SERPL-MCNC: 31.2 MG/DL (ref 8–23)
BUN/CREAT SERPL: 19.3 (ref 7–25)
CALCIUM SPEC-SCNC: 9.4 MG/DL (ref 8.6–10.5)
CHLORIDE SERPL-SCNC: 99 MMOL/L (ref 98–107)
CO2 SERPL-SCNC: 22.1 MMOL/L (ref 22–29)
CREAT SERPL-MCNC: 1.62 MG/DL (ref 0.76–1.27)
DEPRECATED RDW RBC AUTO: 44.3 FL (ref 37–54)
EGFRCR SERPLBLD CKD-EPI 2021: 47.7 ML/MIN/1.73
ERYTHROCYTE [DISTWIDTH] IN BLOOD BY AUTOMATED COUNT: 13.7 % (ref 12.3–15.4)
GLUCOSE SERPL-MCNC: 102 MG/DL (ref 65–99)
HCT VFR BLD AUTO: 46.3 % (ref 37.5–51)
HGB BLD-MCNC: 15.8 G/DL (ref 13–17.7)
LV EF 2D ECHO EST: 50 %
MAGNESIUM SERPL-MCNC: 2.5 MG/DL (ref 1.6–2.4)
MCH RBC QN AUTO: 30 PG (ref 26.6–33)
MCHC RBC AUTO-ENTMCNC: 34.1 G/DL (ref 31.5–35.7)
MCV RBC AUTO: 88 FL (ref 79–97)
NT-PROBNP SERPL-MCNC: 134 PG/ML (ref 0–900)
PLATELET # BLD AUTO: 255 10*3/MM3 (ref 140–450)
PMV BLD AUTO: 10 FL (ref 6–12)
POTASSIUM SERPL-SCNC: 3.9 MMOL/L (ref 3.5–5.2)
QT INTERVAL: 384 MS
QT INTERVAL: 413 MS
QT INTERVAL: 471 MS
QTC INTERVAL: 455 MS
QTC INTERVAL: 511 MS
QTC INTERVAL: 526 MS
RBC # BLD AUTO: 5.26 10*6/MM3 (ref 4.14–5.8)
SODIUM SERPL-SCNC: 134 MMOL/L (ref 136–145)
WBC NRBC COR # BLD AUTO: 12.15 10*3/MM3 (ref 3.4–10.8)

## 2025-08-12 PROCEDURE — 25010000002 FUROSEMIDE PER 20 MG: Performed by: INTERNAL MEDICINE

## 2025-08-12 PROCEDURE — 85027 COMPLETE CBC AUTOMATED: CPT | Performed by: INTERNAL MEDICINE

## 2025-08-12 PROCEDURE — 94761 N-INVAS EAR/PLS OXIMETRY MLT: CPT

## 2025-08-12 PROCEDURE — 94799 UNLISTED PULMONARY SVC/PX: CPT

## 2025-08-12 PROCEDURE — 83735 ASSAY OF MAGNESIUM: CPT | Performed by: INTERNAL MEDICINE

## 2025-08-12 PROCEDURE — 83880 ASSAY OF NATRIURETIC PEPTIDE: CPT | Performed by: INTERNAL MEDICINE

## 2025-08-12 PROCEDURE — 99232 SBSQ HOSP IP/OBS MODERATE 35: CPT | Performed by: INTERNAL MEDICINE

## 2025-08-12 PROCEDURE — 80048 BASIC METABOLIC PNL TOTAL CA: CPT | Performed by: INTERNAL MEDICINE

## 2025-08-12 RX ORDER — ALUMINA, MAGNESIA, AND SIMETHICONE 2400; 2400; 240 MG/30ML; MG/30ML; MG/30ML
15 SUSPENSION ORAL EVERY 6 HOURS PRN
Status: DISCONTINUED | OUTPATIENT
Start: 2025-08-12 | End: 2025-08-13 | Stop reason: HOSPADM

## 2025-08-12 RX ORDER — IOPAMIDOL 755 MG/ML
INJECTION, SOLUTION INTRAVASCULAR
Status: DISCONTINUED | OUTPATIENT
Start: 2025-08-08 | End: 2025-08-12 | Stop reason: HOSPADM

## 2025-08-12 RX ORDER — AMOXICILLIN 250 MG
2 CAPSULE ORAL 2 TIMES DAILY
Status: DISCONTINUED | OUTPATIENT
Start: 2025-08-12 | End: 2025-08-13 | Stop reason: HOSPADM

## 2025-08-12 RX ORDER — HYDROXYZINE HYDROCHLORIDE 25 MG/1
25 TABLET, FILM COATED ORAL 3 TIMES DAILY PRN
Status: DISCONTINUED | OUTPATIENT
Start: 2025-08-12 | End: 2025-08-13 | Stop reason: HOSPADM

## 2025-08-12 RX ORDER — ONDANSETRON 2 MG/ML
4 INJECTION INTRAMUSCULAR; INTRAVENOUS EVERY 4 HOURS PRN
Status: DISCONTINUED | OUTPATIENT
Start: 2025-08-12 | End: 2025-08-13 | Stop reason: HOSPADM

## 2025-08-12 RX ORDER — NICOTINE 21 MG/24HR
1 PATCH, TRANSDERMAL 24 HOURS TRANSDERMAL DAILY PRN
Status: DISCONTINUED | OUTPATIENT
Start: 2025-08-12 | End: 2025-08-13 | Stop reason: HOSPADM

## 2025-08-12 RX ORDER — LIDOCAINE 4 G/G
1 PATCH TOPICAL DAILY PRN
Status: DISCONTINUED | OUTPATIENT
Start: 2025-08-12 | End: 2025-08-13 | Stop reason: HOSPADM

## 2025-08-12 RX ORDER — LIDOCAINE 5 G/100G
1 CREAM RECTAL; TOPICAL 3 TIMES DAILY PRN
Status: DISCONTINUED | OUTPATIENT
Start: 2025-08-12 | End: 2025-08-13 | Stop reason: HOSPADM

## 2025-08-12 RX ORDER — ACETAMINOPHEN 325 MG/1
650 TABLET ORAL EVERY 6 HOURS PRN
Status: DISCONTINUED | OUTPATIENT
Start: 2025-08-12 | End: 2025-08-13 | Stop reason: HOSPADM

## 2025-08-12 RX ADMIN — METOPROLOL TARTRATE 100 MG: 50 TABLET, FILM COATED ORAL at 14:17

## 2025-08-12 RX ADMIN — METOPROLOL TARTRATE 100 MG: 50 TABLET, FILM COATED ORAL at 05:12

## 2025-08-12 RX ADMIN — BUPROPION HYDROCHLORIDE 150 MG: 100 TABLET, FILM COATED ORAL at 20:18

## 2025-08-12 RX ADMIN — SENNOSIDES, DOCUSATE SODIUM 2 TABLET: 50; 8.6 TABLET, FILM COATED ORAL at 20:18

## 2025-08-12 RX ADMIN — FUROSEMIDE 40 MG: 10 INJECTION, SOLUTION INTRAMUSCULAR; INTRAVENOUS at 18:40

## 2025-08-12 RX ADMIN — APIXABAN 5 MG: 5 TABLET, FILM COATED ORAL at 08:55

## 2025-08-12 RX ADMIN — APIXABAN 5 MG: 5 TABLET, FILM COATED ORAL at 20:18

## 2025-08-12 RX ADMIN — FUROSEMIDE 40 MG: 10 INJECTION, SOLUTION INTRAMUSCULAR; INTRAVENOUS at 08:55

## 2025-08-12 RX ADMIN — BUPROPION HYDROCHLORIDE 150 MG: 100 TABLET, FILM COATED ORAL at 08:56

## 2025-08-12 RX ADMIN — ASPIRIN 81 MG CHEWABLE TABLET 81 MG: 81 TABLET CHEWABLE at 08:55

## 2025-08-12 RX ADMIN — SENNOSIDES, DOCUSATE SODIUM 2 TABLET: 50; 8.6 TABLET, FILM COATED ORAL at 10:15

## 2025-08-12 RX ADMIN — EMPAGLIFLOZIN 10 MG: 10 TABLET, FILM COATED ORAL at 08:55

## 2025-08-12 RX ADMIN — METOPROLOL TARTRATE 100 MG: 50 TABLET, FILM COATED ORAL at 21:05

## 2025-08-12 RX ADMIN — TAMSULOSIN HYDROCHLORIDE 0.8 MG: 0.4 CAPSULE ORAL at 08:55

## 2025-08-12 RX ADMIN — ATORVASTATIN CALCIUM 40 MG: 40 TABLET, FILM COATED ORAL at 20:18

## 2025-08-12 RX ADMIN — ACETAMINOPHEN 650 MG: 325 TABLET ORAL at 15:13

## 2025-08-13 ENCOUNTER — READMISSION MANAGEMENT (OUTPATIENT)
Dept: CALL CENTER | Facility: HOSPITAL | Age: 65
End: 2025-08-13
Payer: OTHER GOVERNMENT

## 2025-08-13 VITALS
DIASTOLIC BLOOD PRESSURE: 83 MMHG | SYSTOLIC BLOOD PRESSURE: 115 MMHG | BODY MASS INDEX: 42.66 KG/M2 | RESPIRATION RATE: 18 BRPM | OXYGEN SATURATION: 94 % | HEIGHT: 72 IN | WEIGHT: 315 LBS | TEMPERATURE: 97.7 F | HEART RATE: 65 BPM

## 2025-08-13 LAB
ANION GAP SERPL CALCULATED.3IONS-SCNC: 10.8 MMOL/L (ref 5–15)
BASOPHILS # BLD AUTO: 0.13 10*3/MM3 (ref 0–0.2)
BASOPHILS NFR BLD AUTO: 1.3 % (ref 0–1.5)
BUN SERPL-MCNC: 30 MG/DL (ref 8–23)
BUN/CREAT SERPL: 21.1 (ref 7–25)
CALCIUM SPEC-SCNC: 9.3 MG/DL (ref 8.6–10.5)
CHLORIDE SERPL-SCNC: 100 MMOL/L (ref 98–107)
CO2 SERPL-SCNC: 23.2 MMOL/L (ref 22–29)
CREAT SERPL-MCNC: 1.42 MG/DL (ref 0.76–1.27)
DEPRECATED RDW RBC AUTO: 43.4 FL (ref 37–54)
EGFRCR SERPLBLD CKD-EPI 2021: 55.9 ML/MIN/1.73
EOSINOPHIL # BLD AUTO: 0.19 10*3/MM3 (ref 0–0.4)
EOSINOPHIL NFR BLD AUTO: 1.9 % (ref 0.3–6.2)
ERYTHROCYTE [DISTWIDTH] IN BLOOD BY AUTOMATED COUNT: 13.4 % (ref 12.3–15.4)
GLUCOSE SERPL-MCNC: 109 MG/DL (ref 65–99)
HCT VFR BLD AUTO: 42.1 % (ref 37.5–51)
HGB BLD-MCNC: 14.6 G/DL (ref 13–17.7)
IMM GRANULOCYTES # BLD AUTO: 0.02 10*3/MM3 (ref 0–0.05)
IMM GRANULOCYTES NFR BLD AUTO: 0.2 % (ref 0–0.5)
LYMPHOCYTES # BLD AUTO: 3.65 10*3/MM3 (ref 0.7–3.1)
LYMPHOCYTES NFR BLD AUTO: 35.7 % (ref 19.6–45.3)
MAGNESIUM SERPL-MCNC: 2.5 MG/DL (ref 1.6–2.4)
MCH RBC QN AUTO: 30.4 PG (ref 26.6–33)
MCHC RBC AUTO-ENTMCNC: 34.7 G/DL (ref 31.5–35.7)
MCV RBC AUTO: 87.7 FL (ref 79–97)
MONOCYTES # BLD AUTO: 0.67 10*3/MM3 (ref 0.1–0.9)
MONOCYTES NFR BLD AUTO: 6.6 % (ref 5–12)
NEUTROPHILS NFR BLD AUTO: 5.56 10*3/MM3 (ref 1.7–7)
NEUTROPHILS NFR BLD AUTO: 54.3 % (ref 42.7–76)
NRBC BLD AUTO-RTO: 0 /100 WBC (ref 0–0.2)
PHOSPHATE SERPL-MCNC: 3.7 MG/DL (ref 2.5–4.5)
PLATELET # BLD AUTO: 236 10*3/MM3 (ref 140–450)
PMV BLD AUTO: 9.7 FL (ref 6–12)
POTASSIUM SERPL-SCNC: 3.4 MMOL/L (ref 3.5–5.2)
RBC # BLD AUTO: 4.8 10*6/MM3 (ref 4.14–5.8)
SODIUM SERPL-SCNC: 134 MMOL/L (ref 136–145)
WBC NRBC COR # BLD AUTO: 10.22 10*3/MM3 (ref 3.4–10.8)

## 2025-08-13 PROCEDURE — 99232 SBSQ HOSP IP/OBS MODERATE 35: CPT | Performed by: INTERNAL MEDICINE

## 2025-08-13 PROCEDURE — 83735 ASSAY OF MAGNESIUM: CPT | Performed by: INTERNAL MEDICINE

## 2025-08-13 PROCEDURE — 85025 COMPLETE CBC W/AUTO DIFF WBC: CPT | Performed by: INTERNAL MEDICINE

## 2025-08-13 PROCEDURE — 84100 ASSAY OF PHOSPHORUS: CPT | Performed by: INTERNAL MEDICINE

## 2025-08-13 PROCEDURE — 80048 BASIC METABOLIC PNL TOTAL CA: CPT | Performed by: INTERNAL MEDICINE

## 2025-08-13 RX ORDER — POTASSIUM CHLORIDE 1500 MG/1
20 TABLET, EXTENDED RELEASE ORAL DAILY
Qty: 30 TABLET | Refills: 0 | Status: SHIPPED | OUTPATIENT
Start: 2025-08-13 | End: 2025-09-12

## 2025-08-13 RX ORDER — METOPROLOL TARTRATE 100 MG/1
100 TABLET ORAL EVERY 8 HOURS SCHEDULED
Qty: 90 TABLET | Refills: 0 | Status: SHIPPED | OUTPATIENT
Start: 2025-08-13 | End: 2025-09-12

## 2025-08-13 RX ORDER — BUMETANIDE 1 MG/1
1 TABLET ORAL DAILY
Status: DISCONTINUED | OUTPATIENT
Start: 2025-08-13 | End: 2025-08-13 | Stop reason: HOSPADM

## 2025-08-13 RX ORDER — POTASSIUM CHLORIDE 1500 MG/1
20 TABLET, EXTENDED RELEASE ORAL DAILY
Status: DISCONTINUED | OUTPATIENT
Start: 2025-08-13 | End: 2025-08-13 | Stop reason: HOSPADM

## 2025-08-13 RX ORDER — BUMETANIDE 1 MG/1
1 TABLET ORAL DAILY
Qty: 30 TABLET | Refills: 0 | Status: SHIPPED | OUTPATIENT
Start: 2025-08-13 | End: 2025-09-12

## 2025-08-13 RX ORDER — ASPIRIN 81 MG/1
81 TABLET, CHEWABLE ORAL DAILY
Qty: 90 TABLET | Refills: 0 | Status: SHIPPED | OUTPATIENT
Start: 2025-08-13 | End: 2025-11-11

## 2025-08-13 RX ORDER — AMOXICILLIN 250 MG
2 CAPSULE ORAL 2 TIMES DAILY
Qty: 120 TABLET | Refills: 0 | Status: SHIPPED | OUTPATIENT
Start: 2025-08-13 | End: 2025-09-12

## 2025-08-13 RX ORDER — DAPAGLIFLOZIN 10 MG/1
10 TABLET, FILM COATED ORAL DAILY
Qty: 30 TABLET | Refills: 0 | Status: SHIPPED | OUTPATIENT
Start: 2025-08-13

## 2025-08-13 RX ADMIN — SENNOSIDES, DOCUSATE SODIUM 2 TABLET: 50; 8.6 TABLET, FILM COATED ORAL at 08:45

## 2025-08-13 RX ADMIN — BUMETANIDE 1 MG: 1 TABLET ORAL at 08:45

## 2025-08-13 RX ADMIN — TAMSULOSIN HYDROCHLORIDE 0.8 MG: 0.4 CAPSULE ORAL at 08:45

## 2025-08-13 RX ADMIN — BUPROPION HYDROCHLORIDE 150 MG: 100 TABLET, FILM COATED ORAL at 08:45

## 2025-08-13 RX ADMIN — APIXABAN 5 MG: 5 TABLET, FILM COATED ORAL at 08:45

## 2025-08-13 RX ADMIN — EMPAGLIFLOZIN 10 MG: 10 TABLET, FILM COATED ORAL at 08:45

## 2025-08-13 RX ADMIN — POTASSIUM CHLORIDE 20 MEQ: 1500 TABLET, EXTENDED RELEASE ORAL at 08:45

## 2025-08-13 RX ADMIN — METOPROLOL TARTRATE 100 MG: 50 TABLET, FILM COATED ORAL at 05:51

## 2025-08-13 RX ADMIN — ASPIRIN 81 MG CHEWABLE TABLET 81 MG: 81 TABLET CHEWABLE at 08:45

## 2025-08-18 ENCOUNTER — READMISSION MANAGEMENT (OUTPATIENT)
Dept: CALL CENTER | Facility: HOSPITAL | Age: 65
End: 2025-08-18
Payer: OTHER GOVERNMENT

## 2025-08-19 ENCOUNTER — READMISSION MANAGEMENT (OUTPATIENT)
Dept: CALL CENTER | Facility: HOSPITAL | Age: 65
End: 2025-08-19
Payer: OTHER GOVERNMENT

## 2025-08-25 ENCOUNTER — READMISSION MANAGEMENT (OUTPATIENT)
Dept: CALL CENTER | Facility: HOSPITAL | Age: 65
End: 2025-08-25
Payer: OTHER GOVERNMENT

## (undated) DEVICE — GLIDESHEATH SLENDER STAINLESS STEEL KIT: Brand: GLIDESHEATH SLENDER

## (undated) DEVICE — CATH F6 ST JR 3.5 100CM: Brand: SUPERTORQUE

## (undated) DEVICE — GW FC FLOP/TP .035 260CM 3MM

## (undated) DEVICE — SWAN-GANZ THERMODILUTION CATHETER: Brand: SWAN-GANZ

## (undated) DEVICE — CATH F6 ST JL 3.5 100CM: Brand: SUPERTORQUE